# Patient Record
Sex: MALE | Race: WHITE | HISPANIC OR LATINO | Employment: OTHER | URBAN - METROPOLITAN AREA
[De-identification: names, ages, dates, MRNs, and addresses within clinical notes are randomized per-mention and may not be internally consistent; named-entity substitution may affect disease eponyms.]

---

## 2018-07-31 ENCOUNTER — HOSPITAL ENCOUNTER (EMERGENCY)
Facility: HOSPITAL | Age: 43
Discharge: HOME | End: 2018-07-31
Attending: EMERGENCY MEDICINE

## 2018-07-31 VITALS
DIASTOLIC BLOOD PRESSURE: 68 MMHG | TEMPERATURE: 98 F | RESPIRATION RATE: 20 BRPM | HEIGHT: 68 IN | WEIGHT: 196 LBS | BODY MASS INDEX: 29.7 KG/M2 | SYSTOLIC BLOOD PRESSURE: 108 MMHG | OXYGEN SATURATION: 99 % | HEART RATE: 81 BPM

## 2018-07-31 DIAGNOSIS — J18.9 PNEUMONIA OF RIGHT MIDDLE LOBE DUE TO INFECTIOUS ORGANISM: Primary | ICD-10-CM

## 2018-07-31 DIAGNOSIS — R07.9 CHEST PAIN: ICD-10-CM

## 2018-07-31 LAB
ALBUMIN SERPL BCP-MCNC: 4 G/DL
ALP SERPL-CCNC: 105 U/L
ALT SERPL W/O P-5'-P-CCNC: 38 U/L
ANION GAP SERPL CALC-SCNC: 12 MMOL/L
AST SERPL-CCNC: 21 U/L
BASOPHILS # BLD AUTO: 0.03 K/UL
BASOPHILS NFR BLD: 0.2 %
BILIRUB SERPL-MCNC: 0.8 MG/DL
BUN SERPL-MCNC: 9 MG/DL
CALCIUM SERPL-MCNC: 10.3 MG/DL
CHLORIDE SERPL-SCNC: 97 MMOL/L
CK SERPL-CCNC: 104 U/L
CO2 SERPL-SCNC: 25 MMOL/L
CREAT SERPL-MCNC: 0.8 MG/DL
D DIMER PPP IA.FEU-MCNC: 0.49 MG/L FEU
DIFFERENTIAL METHOD: ABNORMAL
EOSINOPHIL # BLD AUTO: 0.2 K/UL
EOSINOPHIL NFR BLD: 1.6 %
ERYTHROCYTE [DISTWIDTH] IN BLOOD BY AUTOMATED COUNT: 11.6 %
EST. GFR  (AFRICAN AMERICAN): >60 ML/MIN/1.73 M^2
EST. GFR  (NON AFRICAN AMERICAN): >60 ML/MIN/1.73 M^2
GLUCOSE SERPL-MCNC: 334 MG/DL
HCT VFR BLD AUTO: 50.6 %
HGB BLD-MCNC: 17.9 G/DL
LYMPHOCYTES # BLD AUTO: 3.2 K/UL
LYMPHOCYTES NFR BLD: 26.7 %
MCH RBC QN AUTO: 30.8 PG
MCHC RBC AUTO-ENTMCNC: 35.4 G/DL
MCV RBC AUTO: 87 FL
MONOCYTES # BLD AUTO: 0.9 K/UL
MONOCYTES NFR BLD: 7.1 %
NEUTROPHILS # BLD AUTO: 7.7 K/UL
NEUTROPHILS NFR BLD: 64.1 %
PLATELET # BLD AUTO: 352 K/UL
PMV BLD AUTO: 9.2 FL
POTASSIUM SERPL-SCNC: 4.2 MMOL/L
PROT SERPL-MCNC: 8.6 G/DL
RBC # BLD AUTO: 5.82 M/UL
SODIUM SERPL-SCNC: 134 MMOL/L
TROPONIN I SERPL DL<=0.01 NG/ML-MCNC: <0.006 NG/ML
WBC # BLD AUTO: 12.06 K/UL

## 2018-07-31 PROCEDURE — 85025 COMPLETE CBC W/AUTO DIFF WBC: CPT

## 2018-07-31 PROCEDURE — 87040 BLOOD CULTURE FOR BACTERIA: CPT | Mod: 59

## 2018-07-31 PROCEDURE — 99284 EMERGENCY DEPT VISIT MOD MDM: CPT | Mod: 25

## 2018-07-31 PROCEDURE — 80053 COMPREHEN METABOLIC PANEL: CPT

## 2018-07-31 PROCEDURE — 63600175 PHARM REV CODE 636 W HCPCS: Performed by: EMERGENCY MEDICINE

## 2018-07-31 PROCEDURE — 85379 FIBRIN DEGRADATION QUANT: CPT

## 2018-07-31 PROCEDURE — 84484 ASSAY OF TROPONIN QUANT: CPT

## 2018-07-31 PROCEDURE — 96365 THER/PROPH/DIAG IV INF INIT: CPT

## 2018-07-31 PROCEDURE — 82550 ASSAY OF CK (CPK): CPT

## 2018-07-31 PROCEDURE — 96375 TX/PRO/DX INJ NEW DRUG ADDON: CPT

## 2018-07-31 PROCEDURE — 25000003 PHARM REV CODE 250: Performed by: EMERGENCY MEDICINE

## 2018-07-31 RX ORDER — ONDANSETRON 2 MG/ML
4 INJECTION INTRAMUSCULAR; INTRAVENOUS
Status: COMPLETED | OUTPATIENT
Start: 2018-07-31 | End: 2018-07-31

## 2018-07-31 RX ORDER — IBUPROFEN 600 MG/1
600 TABLET ORAL EVERY 6 HOURS PRN
Qty: 20 TABLET | Refills: 0 | Status: SHIPPED | OUTPATIENT
Start: 2018-07-31 | End: 2020-01-10 | Stop reason: CLARIF

## 2018-07-31 RX ORDER — AZITHROMYCIN 250 MG/1
500 TABLET, FILM COATED ORAL
Status: COMPLETED | OUTPATIENT
Start: 2018-07-31 | End: 2018-07-31

## 2018-07-31 RX ORDER — MORPHINE SULFATE 4 MG/ML
4 INJECTION, SOLUTION INTRAMUSCULAR; INTRAVENOUS
Status: COMPLETED | OUTPATIENT
Start: 2018-07-31 | End: 2018-07-31

## 2018-07-31 RX ORDER — PROMETHAZINE HYDROCHLORIDE AND CODEINE PHOSPHATE 6.25; 1 MG/5ML; MG/5ML
5 SOLUTION ORAL EVERY 4 HOURS PRN
Qty: 120 ML | Refills: 0 | Status: SHIPPED | OUTPATIENT
Start: 2018-07-31 | End: 2018-08-10

## 2018-07-31 RX ORDER — AZITHROMYCIN 250 MG/1
TABLET, FILM COATED ORAL
Qty: 6 TABLET | Refills: 0 | Status: SHIPPED | OUTPATIENT
Start: 2018-07-31 | End: 2018-08-05

## 2018-07-31 RX ORDER — KETOROLAC TROMETHAMINE 30 MG/ML
30 INJECTION, SOLUTION INTRAMUSCULAR; INTRAVENOUS
Status: COMPLETED | OUTPATIENT
Start: 2018-07-31 | End: 2018-07-31

## 2018-07-31 RX ADMIN — AZITHROMYCIN MONOHYDRATE 500 MG: 250 TABLET ORAL at 07:07

## 2018-07-31 RX ADMIN — MORPHINE SULFATE 4 MG: 4 INJECTION INTRAVENOUS at 06:07

## 2018-07-31 RX ADMIN — ONDANSETRON 4 MG: 2 INJECTION, SOLUTION INTRAMUSCULAR; INTRAVENOUS at 06:07

## 2018-07-31 RX ADMIN — KETOROLAC TROMETHAMINE 30 MG: 30 INJECTION, SOLUTION INTRAMUSCULAR at 06:07

## 2018-07-31 RX ADMIN — CEFTRIAXONE 1 G: 1 INJECTION, SOLUTION INTRAVENOUS at 06:07

## 2018-07-31 RX ADMIN — SODIUM CHLORIDE 1000 ML: 0.9 INJECTION, SOLUTION INTRAVENOUS at 06:07
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
## 2018-07-31 NOTE — ED NOTES
Pt report received from OLIVA Campbell. Pt connected to cardiac monitor, BP cuff, and pulse ox. Will continue to monitor.

## 2018-08-05 LAB
BACTERIA BLD CULT: NORMAL
BACTERIA BLD CULT: NORMAL

## 2018-09-07 ENCOUNTER — HOSPITAL ENCOUNTER (INPATIENT)
Facility: HOSPITAL | Age: 43
LOS: 2 days | Discharge: HOME | DRG: 178 | End: 2018-09-11
Attending: EMERGENCY MEDICINE | Admitting: FAMILY MEDICINE

## 2018-09-07 DIAGNOSIS — R05.9 COUGH: ICD-10-CM

## 2018-09-07 DIAGNOSIS — E11.9 CONTROLLED TYPE 2 DIABETES MELLITUS WITHOUT COMPLICATION, WITHOUT LONG-TERM CURRENT USE OF INSULIN: ICD-10-CM

## 2018-09-07 DIAGNOSIS — R06.02 SOB (SHORTNESS OF BREATH): ICD-10-CM

## 2018-09-07 DIAGNOSIS — R07.89 ATYPICAL CHEST PAIN: ICD-10-CM

## 2018-09-07 DIAGNOSIS — J98.4 CAVITATING MASS IN RIGHT UPPER LUNG LOBE: Primary | ICD-10-CM

## 2018-09-07 DIAGNOSIS — R91.8 LUNG MASS: ICD-10-CM

## 2018-09-07 LAB
ALBUMIN SERPL BCP-MCNC: 3.6 G/DL
ALP SERPL-CCNC: 84 U/L
ALT SERPL W/O P-5'-P-CCNC: 31 U/L
ANION GAP SERPL CALC-SCNC: 12 MMOL/L
AST SERPL-CCNC: 23 U/L
BASOPHILS # BLD AUTO: 0.04 K/UL
BASOPHILS NFR BLD: 0.4 %
BILIRUB SERPL-MCNC: 0.4 MG/DL
BUN SERPL-MCNC: 15 MG/DL
CALCIUM SERPL-MCNC: 9.3 MG/DL
CHLORIDE SERPL-SCNC: 100 MMOL/L
CO2 SERPL-SCNC: 21 MMOL/L
CREAT SERPL-MCNC: 1 MG/DL
DIFFERENTIAL METHOD: ABNORMAL
EOSINOPHIL # BLD AUTO: 0.2 K/UL
EOSINOPHIL NFR BLD: 1.9 %
ERYTHROCYTE [DISTWIDTH] IN BLOOD BY AUTOMATED COUNT: 11.5 %
EST. GFR  (AFRICAN AMERICAN): >60 ML/MIN/1.73 M^2
EST. GFR  (NON AFRICAN AMERICAN): >60 ML/MIN/1.73 M^2
GLUCOSE SERPL-MCNC: 429 MG/DL
HCT VFR BLD AUTO: 40.8 %
HGB BLD-MCNC: 14.8 G/DL
LYMPHOCYTES # BLD AUTO: 2.9 K/UL
LYMPHOCYTES NFR BLD: 30.8 %
MCH RBC QN AUTO: 30.5 PG
MCHC RBC AUTO-ENTMCNC: 36.3 G/DL
MCV RBC AUTO: 84 FL
MONOCYTES # BLD AUTO: 0.8 K/UL
MONOCYTES NFR BLD: 8.4 %
NEUTROPHILS # BLD AUTO: 5.4 K/UL
NEUTROPHILS NFR BLD: 58.3 %
PLATELET # BLD AUTO: 309 K/UL
PMV BLD AUTO: 8.9 FL
POTASSIUM SERPL-SCNC: 4 MMOL/L
PROT SERPL-MCNC: 7.3 G/DL
RBC # BLD AUTO: 4.86 M/UL
SODIUM SERPL-SCNC: 133 MMOL/L
WBC # BLD AUTO: 9.34 K/UL

## 2018-09-07 PROCEDURE — 63600175 PHARM REV CODE 636 W HCPCS: Performed by: EMERGENCY MEDICINE

## 2018-09-07 PROCEDURE — 96372 THER/PROPH/DIAG INJ SC/IM: CPT | Mod: 59

## 2018-09-07 PROCEDURE — 96374 THER/PROPH/DIAG INJ IV PUSH: CPT

## 2018-09-07 PROCEDURE — 93005 ELECTROCARDIOGRAM TRACING: CPT

## 2018-09-07 PROCEDURE — 85025 COMPLETE CBC W/AUTO DIFF WBC: CPT

## 2018-09-07 PROCEDURE — 99285 EMERGENCY DEPT VISIT HI MDM: CPT | Mod: 25

## 2018-09-07 PROCEDURE — 93010 ELECTROCARDIOGRAM REPORT: CPT | Mod: ,,, | Performed by: INTERNAL MEDICINE

## 2018-09-07 PROCEDURE — 80053 COMPREHEN METABOLIC PANEL: CPT

## 2018-09-07 PROCEDURE — 96375 TX/PRO/DX INJ NEW DRUG ADDON: CPT

## 2018-09-07 PROCEDURE — 82962 GLUCOSE BLOOD TEST: CPT

## 2018-09-07 RX ORDER — KETOROLAC TROMETHAMINE 30 MG/ML
15 INJECTION, SOLUTION INTRAMUSCULAR; INTRAVENOUS
Status: COMPLETED | OUTPATIENT
Start: 2018-09-07 | End: 2018-09-07

## 2018-09-07 RX ORDER — MORPHINE SULFATE 4 MG/ML
4 INJECTION, SOLUTION INTRAMUSCULAR; INTRAVENOUS
Status: COMPLETED | OUTPATIENT
Start: 2018-09-07 | End: 2018-09-07

## 2018-09-07 RX ADMIN — KETOROLAC TROMETHAMINE 15 MG: 30 INJECTION, SOLUTION INTRAMUSCULAR at 11:09

## 2018-09-07 RX ADMIN — IOHEXOL 75 ML: 350 INJECTION, SOLUTION INTRAVENOUS at 11:09

## 2018-09-07 RX ADMIN — MORPHINE SULFATE 4 MG: 4 INJECTION, SOLUTION INTRAMUSCULAR; INTRAVENOUS at 11:09

## 2018-09-08 PROBLEM — R91.8 LUNG MASS: Status: ACTIVE | Noted: 2018-09-08

## 2018-09-08 PROBLEM — J98.4 CAVITATING MASS IN RIGHT UPPER LUNG LOBE: Status: ACTIVE | Noted: 2018-09-08

## 2018-09-08 LAB
AMPHET+METHAMPHET UR QL: NEGATIVE
BARBITURATES UR QL SCN>200 NG/ML: NEGATIVE
BENZODIAZ UR QL SCN>200 NG/ML: NEGATIVE
BILIRUB UR QL STRIP: NEGATIVE
BZE UR QL SCN: NEGATIVE
CANNABINOIDS UR QL SCN: NORMAL
CLARITY UR: CLEAR
COLOR UR: YELLOW
CREAT UR-MCNC: 132.5 MG/DL
CRP SERPL-MCNC: 21.28 MG/L
ERYTHROCYTE [SEDIMENTATION RATE] IN BLOOD BY WESTERGREN METHOD: 37 MM/HR
ESTIMATED AVG GLUCOSE: 280 MG/DL
ESTIMATED AVG GLUCOSE: 280 MG/DL
GLUCOSE UR QL STRIP: ABNORMAL
HBA1C MFR BLD HPLC: 11.4 %
HBA1C MFR BLD HPLC: 11.4 %
HGB UR QL STRIP: NEGATIVE
KETONES UR QL STRIP: NEGATIVE
LACTATE SERPL-SCNC: 1.3 MMOL/L
LEUKOCYTE ESTERASE UR QL STRIP: NEGATIVE
MAGNESIUM SERPL-MCNC: 2 MG/DL
METHADONE UR QL SCN>300 NG/ML: NEGATIVE
NITRITE UR QL STRIP: NEGATIVE
OPIATES UR QL SCN: NORMAL
PCP UR QL SCN>25 NG/ML: NEGATIVE
PH UR STRIP: 6 [PH] (ref 5–8)
PHOSPHATE SERPL-MCNC: 3.5 MG/DL
POCT GLUCOSE: 235 MG/DL (ref 70–110)
POCT GLUCOSE: 276 MG/DL (ref 70–110)
POCT GLUCOSE: 289 MG/DL (ref 70–110)
POCT GLUCOSE: 298 MG/DL (ref 70–110)
POCT GLUCOSE: 308 MG/DL (ref 70–110)
PROCALCITONIN SERPL IA-MCNC: 0.04 NG/ML
PROT UR QL STRIP: NEGATIVE
RPR SER QL: NORMAL
SP GR UR STRIP: 1.02 (ref 1–1.03)
T4 FREE SERPL-MCNC: 1.25 NG/DL
TOXICOLOGY INFORMATION: NORMAL
TSH SERPL DL<=0.005 MIU/L-ACNC: 0.04 UIU/ML
URN SPEC COLLECT METH UR: ABNORMAL
UROBILINOGEN UR STRIP-ACNC: NEGATIVE EU/DL

## 2018-09-08 PROCEDURE — 83605 ASSAY OF LACTIC ACID: CPT

## 2018-09-08 PROCEDURE — 87106 FUNGI IDENTIFICATION YEAST: CPT

## 2018-09-08 PROCEDURE — 87206 SMEAR FLUORESCENT/ACID STAI: CPT

## 2018-09-08 PROCEDURE — 80074 ACUTE HEPATITIS PANEL: CPT

## 2018-09-08 PROCEDURE — 85652 RBC SED RATE AUTOMATED: CPT

## 2018-09-08 PROCEDURE — 84145 PROCALCITONIN (PCT): CPT

## 2018-09-08 PROCEDURE — 97802 MEDICAL NUTRITION INDIV IN: CPT

## 2018-09-08 PROCEDURE — 94640 AIRWAY INHALATION TREATMENT: CPT

## 2018-09-08 PROCEDURE — 87385 HISTOPLASMA CAPSUL AG IA: CPT | Mod: 91

## 2018-09-08 PROCEDURE — 25000003 PHARM REV CODE 250: Performed by: FAMILY MEDICINE

## 2018-09-08 PROCEDURE — 84439 ASSAY OF FREE THYROXINE: CPT

## 2018-09-08 PROCEDURE — 86141 C-REACTIVE PROTEIN HS: CPT

## 2018-09-08 PROCEDURE — 87015 SPECIMEN INFECT AGNT CONCNTJ: CPT

## 2018-09-08 PROCEDURE — 25000242 PHARM REV CODE 250 ALT 637 W/ HCPCS: Performed by: STUDENT IN AN ORGANIZED HEALTH CARE EDUCATION/TRAINING PROGRAM

## 2018-09-08 PROCEDURE — 87210 SMEAR WET MOUNT SALINE/INK: CPT

## 2018-09-08 PROCEDURE — 87102 FUNGUS ISOLATION CULTURE: CPT

## 2018-09-08 PROCEDURE — 25500020 PHARM REV CODE 255: Performed by: EMERGENCY MEDICINE

## 2018-09-08 PROCEDURE — 86580 TB INTRADERMAL TEST: CPT | Performed by: STUDENT IN AN ORGANIZED HEALTH CARE EDUCATION/TRAINING PROGRAM

## 2018-09-08 PROCEDURE — 99900038 HC OT GENERIC THERAPY SCREENING (STAT)

## 2018-09-08 PROCEDURE — 87086 URINE CULTURE/COLONY COUNT: CPT

## 2018-09-08 PROCEDURE — 86003 ALLG SPEC IGE CRUDE XTRC EA: CPT

## 2018-09-08 PROCEDURE — G0378 HOSPITAL OBSERVATION PER HR: HCPCS

## 2018-09-08 PROCEDURE — 81003 URINALYSIS AUTO W/O SCOPE: CPT | Mod: 59

## 2018-09-08 PROCEDURE — 87070 CULTURE OTHR SPECIMN AEROBIC: CPT

## 2018-09-08 PROCEDURE — 63600175 PHARM REV CODE 636 W HCPCS: Performed by: STUDENT IN AN ORGANIZED HEALTH CARE EDUCATION/TRAINING PROGRAM

## 2018-09-08 PROCEDURE — 87116 MYCOBACTERIA CULTURE: CPT

## 2018-09-08 PROCEDURE — 86592 SYPHILIS TEST NON-TREP QUAL: CPT

## 2018-09-08 PROCEDURE — 83036 HEMOGLOBIN GLYCOSYLATED A1C: CPT

## 2018-09-08 PROCEDURE — 99900037 HC PT THERAPY SCREENING (STAT)

## 2018-09-08 PROCEDURE — 80307 DRUG TEST PRSMV CHEM ANLYZR: CPT

## 2018-09-08 PROCEDURE — 83735 ASSAY OF MAGNESIUM: CPT

## 2018-09-08 PROCEDURE — 86698 HISTOPLASMA ANTIBODY: CPT | Mod: 91

## 2018-09-08 PROCEDURE — 84100 ASSAY OF PHOSPHORUS: CPT

## 2018-09-08 PROCEDURE — 63600175 PHARM REV CODE 636 W HCPCS: Performed by: FAMILY MEDICINE

## 2018-09-08 PROCEDURE — 84443 ASSAY THYROID STIM HORMONE: CPT

## 2018-09-08 PROCEDURE — 87449 NOS EACH ORGANISM AG IA: CPT

## 2018-09-08 PROCEDURE — 86703 HIV-1/HIV-2 1 RESULT ANTBDY: CPT

## 2018-09-08 PROCEDURE — 82785 ASSAY OF IGE: CPT

## 2018-09-08 PROCEDURE — 87205 SMEAR GRAM STAIN: CPT

## 2018-09-08 PROCEDURE — 87040 BLOOD CULTURE FOR BACTERIA: CPT | Mod: 59

## 2018-09-08 PROCEDURE — 25000003 PHARM REV CODE 250: Performed by: STUDENT IN AN ORGANIZED HEALTH CARE EDUCATION/TRAINING PROGRAM

## 2018-09-08 PROCEDURE — 94761 N-INVAS EAR/PLS OXIMETRY MLT: CPT

## 2018-09-08 PROCEDURE — 87385 HISTOPLASMA CAPSUL AG IA: CPT

## 2018-09-08 RX ORDER — MORPHINE SULFATE 10 MG/ML
4 INJECTION, SOLUTION INTRAMUSCULAR; INTRAVENOUS EVERY 4 HOURS PRN
Status: DISCONTINUED | OUTPATIENT
Start: 2018-09-08 | End: 2018-09-08

## 2018-09-08 RX ORDER — ACETAMINOPHEN 325 MG/1
650 TABLET ORAL EVERY 8 HOURS PRN
Status: DISCONTINUED | OUTPATIENT
Start: 2018-09-08 | End: 2018-09-10

## 2018-09-08 RX ORDER — GLUCAGON 1 MG
1 KIT INJECTION
Status: DISCONTINUED | OUTPATIENT
Start: 2018-09-08 | End: 2018-09-08

## 2018-09-08 RX ORDER — IBUPROFEN 200 MG
24 TABLET ORAL
Status: DISCONTINUED | OUTPATIENT
Start: 2018-09-08 | End: 2018-09-11 | Stop reason: HOSPADM

## 2018-09-08 RX ORDER — KETOROLAC TROMETHAMINE 30 MG/ML
15 INJECTION, SOLUTION INTRAMUSCULAR; INTRAVENOUS EVERY 6 HOURS PRN
Status: DISCONTINUED | OUTPATIENT
Start: 2018-09-08 | End: 2018-09-08

## 2018-09-08 RX ORDER — IBUPROFEN 200 MG
16 TABLET ORAL
Status: DISCONTINUED | OUTPATIENT
Start: 2018-09-08 | End: 2018-09-11 | Stop reason: HOSPADM

## 2018-09-08 RX ORDER — RAMELTEON 8 MG/1
8 TABLET ORAL NIGHTLY PRN
Status: DISCONTINUED | OUTPATIENT
Start: 2018-09-08 | End: 2018-09-11 | Stop reason: HOSPADM

## 2018-09-08 RX ORDER — AMOXICILLIN 250 MG
1 CAPSULE ORAL 2 TIMES DAILY
Status: DISCONTINUED | OUTPATIENT
Start: 2018-09-08 | End: 2018-09-11 | Stop reason: HOSPADM

## 2018-09-08 RX ORDER — IPRATROPIUM BROMIDE AND ALBUTEROL SULFATE 2.5; .5 MG/3ML; MG/3ML
3 SOLUTION RESPIRATORY (INHALATION) EVERY 6 HOURS PRN
Status: DISCONTINUED | OUTPATIENT
Start: 2018-09-08 | End: 2018-09-11 | Stop reason: HOSPADM

## 2018-09-08 RX ORDER — KETOROLAC TROMETHAMINE 30 MG/ML
15 INJECTION, SOLUTION INTRAMUSCULAR; INTRAVENOUS EVERY 6 HOURS PRN
Status: COMPLETED | OUTPATIENT
Start: 2018-09-09 | End: 2018-09-09

## 2018-09-08 RX ORDER — SODIUM CHLORIDE FOR INHALATION 3 %
4 VIAL, NEBULIZER (ML) INHALATION ONCE
Status: DISCONTINUED | OUTPATIENT
Start: 2018-09-09 | End: 2018-09-08

## 2018-09-08 RX ORDER — SODIUM CHLORIDE FOR INHALATION 3 %
4 VIAL, NEBULIZER (ML) INHALATION EVERY 8 HOURS
Status: DISCONTINUED | OUTPATIENT
Start: 2018-09-09 | End: 2018-09-11 | Stop reason: HOSPADM

## 2018-09-08 RX ORDER — MORPHINE SULFATE 10 MG/ML
2 INJECTION, SOLUTION INTRAMUSCULAR; INTRAVENOUS ONCE
Status: DISCONTINUED | OUTPATIENT
Start: 2018-09-08 | End: 2018-09-09

## 2018-09-08 RX ORDER — ACETAMINOPHEN 325 MG/1
650 TABLET ORAL EVERY 4 HOURS PRN
Status: DISCONTINUED | OUTPATIENT
Start: 2018-09-08 | End: 2018-09-08

## 2018-09-08 RX ORDER — INSULIN ASPART 100 [IU]/ML
1-10 INJECTION, SOLUTION INTRAVENOUS; SUBCUTANEOUS
Status: DISCONTINUED | OUTPATIENT
Start: 2018-09-08 | End: 2018-09-11 | Stop reason: HOSPADM

## 2018-09-08 RX ORDER — SODIUM CHLORIDE FOR INHALATION 3 %
4 VIAL, NEBULIZER (ML) INHALATION ONCE
Status: COMPLETED | OUTPATIENT
Start: 2018-09-08 | End: 2018-09-08

## 2018-09-08 RX ORDER — INSULIN ASPART 100 [IU]/ML
1-10 INJECTION, SOLUTION INTRAVENOUS; SUBCUTANEOUS EVERY 6 HOURS PRN
Status: DISCONTINUED | OUTPATIENT
Start: 2018-09-08 | End: 2018-09-08

## 2018-09-08 RX ORDER — MORPHINE SULFATE 4 MG/ML
4 INJECTION, SOLUTION INTRAMUSCULAR; INTRAVENOUS EVERY 4 HOURS PRN
Status: DISCONTINUED | OUTPATIENT
Start: 2018-09-08 | End: 2018-09-08

## 2018-09-08 RX ORDER — SODIUM CHLORIDE, SODIUM LACTATE, POTASSIUM CHLORIDE, CALCIUM CHLORIDE 600; 310; 30; 20 MG/100ML; MG/100ML; MG/100ML; MG/100ML
INJECTION, SOLUTION INTRAVENOUS CONTINUOUS
Status: ACTIVE | OUTPATIENT
Start: 2018-09-08 | End: 2018-09-08

## 2018-09-08 RX ORDER — ONDANSETRON 8 MG/1
8 TABLET, ORALLY DISINTEGRATING ORAL EVERY 6 HOURS PRN
Status: DISCONTINUED | OUTPATIENT
Start: 2018-09-08 | End: 2018-09-11 | Stop reason: HOSPADM

## 2018-09-08 RX ORDER — SODIUM CHLORIDE FOR INHALATION 3 %
4 VIAL, NEBULIZER (ML) INHALATION ONCE
Status: DISCONTINUED | OUTPATIENT
Start: 2018-09-08 | End: 2018-09-11 | Stop reason: HOSPADM

## 2018-09-08 RX ORDER — VANCOMYCIN HCL IN 5 % DEXTROSE 1G/250ML
1000 PLASTIC BAG, INJECTION (ML) INTRAVENOUS
Status: DISCONTINUED | OUTPATIENT
Start: 2018-09-08 | End: 2018-09-08

## 2018-09-08 RX ORDER — GLUCAGON 1 MG
1 KIT INJECTION
Status: DISCONTINUED | OUTPATIENT
Start: 2018-09-08 | End: 2018-09-11 | Stop reason: HOSPADM

## 2018-09-08 RX ORDER — SODIUM CHLORIDE 0.9 % (FLUSH) 0.9 %
5 SYRINGE (ML) INJECTION
Status: DISCONTINUED | OUTPATIENT
Start: 2018-09-08 | End: 2018-09-11 | Stop reason: HOSPADM

## 2018-09-08 RX ADMIN — SENNOSIDES AND DOCUSATE SODIUM 1 TABLET: 8.6; 5 TABLET ORAL at 09:09

## 2018-09-08 RX ADMIN — ACETAMINOPHEN 650 MG: 325 TABLET ORAL at 10:09

## 2018-09-08 RX ADMIN — INSULIN DETEMIR 10 UNITS: 100 INJECTION, SOLUTION SUBCUTANEOUS at 09:09

## 2018-09-08 RX ADMIN — PIPERACILLIN AND TAZOBACTAM 4.5 G: 4; .5 INJECTION, POWDER, LYOPHILIZED, FOR SOLUTION INTRAVENOUS; PARENTERAL at 12:09

## 2018-09-08 RX ADMIN — INSULIN ASPART 3 UNITS: 100 INJECTION, SOLUTION INTRAVENOUS; SUBCUTANEOUS at 09:09

## 2018-09-08 RX ADMIN — VANCOMYCIN HYDROCHLORIDE 1500 MG: 10 INJECTION, POWDER, LYOPHILIZED, FOR SOLUTION INTRAVENOUS at 04:09

## 2018-09-08 RX ADMIN — ACETAMINOPHEN 650 MG: 325 TABLET ORAL at 09:09

## 2018-09-08 RX ADMIN — SODIUM CHLORIDE 30 MG/ML INHALATION SOLUTION 4 ML: 30 SOLUTION INHALANT at 09:09

## 2018-09-08 RX ADMIN — VANCOMYCIN HYDROCHLORIDE 1000 MG: 1 INJECTION, POWDER, LYOPHILIZED, FOR SOLUTION INTRAVENOUS at 03:09

## 2018-09-08 RX ADMIN — SODIUM CHLORIDE, SODIUM LACTATE, POTASSIUM CHLORIDE, AND CALCIUM CHLORIDE: .6; .31; .03; .02 INJECTION, SOLUTION INTRAVENOUS at 12:09

## 2018-09-08 RX ADMIN — AMPICILLIN SODIUM AND SULBACTAM SODIUM 1.5 G: 2; 1 INJECTION, POWDER, FOR SOLUTION INTRAMUSCULAR; INTRAVENOUS at 09:09

## 2018-09-08 RX ADMIN — MORPHINE SULFATE 4 MG: 4 INJECTION, SOLUTION INTRAMUSCULAR; INTRAVENOUS at 05:09

## 2018-09-08 RX ADMIN — INSULIN ASPART 4 UNITS: 100 INJECTION, SOLUTION INTRAVENOUS; SUBCUTANEOUS at 12:09

## 2018-09-08 RX ADMIN — PIPERACILLIN AND TAZOBACTAM 4.5 G: 4; .5 INJECTION, POWDER, LYOPHILIZED, FOR SOLUTION INTRAVENOUS; PARENTERAL at 05:09

## 2018-09-08 RX ADMIN — SODIUM CHLORIDE, SODIUM LACTATE, POTASSIUM CHLORIDE, AND CALCIUM CHLORIDE: .6; .31; .03; .02 INJECTION, SOLUTION INTRAVENOUS at 04:09

## 2018-09-08 RX ADMIN — INSULIN ASPART 6 UNITS: 100 INJECTION, SOLUTION INTRAVENOUS; SUBCUTANEOUS at 06:09

## 2018-09-08 RX ADMIN — TUBERCULIN PURIFIED PROTEIN DERIVATIVE 5 UNITS: 5 INJECTION INTRADERMAL at 04:09

## 2018-09-08 RX ADMIN — MORPHINE SULFATE 4 MG: 4 INJECTION, SOLUTION INTRAMUSCULAR; INTRAVENOUS at 03:09

## 2018-09-08 RX ADMIN — INSULIN ASPART 3 UNITS: 100 INJECTION, SOLUTION INTRAVENOUS; SUBCUTANEOUS at 02:09
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
## 2018-09-08 NOTE — ED NOTES
Pt presents to the ED with c/o sob. Pt reports he was seen here 3 weeks ago for pneumonia. Pt reports coughing since and coughing blood-tinged sputum. Pt denies cp, headache, n/v, abdominal pain, bladder or bowel issues at this time. Visitors at bedside.
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
## 2018-09-08 NOTE — PLAN OF CARE
Problem: Patient Care Overview  Goal: Plan of Care Review  Outcome: Ongoing (interventions implemented as appropriate)  Recommendations     Recommendation/Intervention: 1.When medically able, ADAT to Diabetic. 2. If PO intake < 50 %, add boost glucose control BID. 3.Written materials attached to EMR for print at discharge.  4. Onsite RD to complete Diabetic diet education by follow up.   Goals: Diet advancement within 72 hrs   Nutrition Goal Status: new  Communication of RD Recs: (POC, sticky note)

## 2018-09-08 NOTE — PLAN OF CARE
Problem: Patient Care Overview  Goal: Plan of Care Review  Outcome: Ongoing (interventions implemented as appropriate)  Plan of care reviewed with the patient. Verbalized clear understanding. Bed alarm set. Bed in lowest position. Pt remain afebrile and free of fall. Call light within reach. Instructed pt to call when getting out of bed. Complaints of R chest discomfort, given prn tylenol and morphine. Reported relief from medication given. IV antibiotics given. Accucheck achs and SSI given as needed. NSR HR between 60's-70's on telemetry monitor. Maintained airborne precautions at all times. No report of SOB or lightheadedness. Will continue to monitor.
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
## 2018-09-08 NOTE — PT/OT/SLP PROGRESS
Physical Therapy  Functional Screen and Discharge    Patient Name:  Andrea Fernández   MRN:  [id number]    PT screen completed this date.     Pt ambulated within the room independently and completed bed mobility at mod I level. Pt with no strength or functional mobility deficits at this time.    No IP PT needs, d/c PT.    Ceci Butler, PT   9/8/2018

## 2018-09-08 NOTE — PT/OT/SLP PROGRESS
Occupational Therapy  OT Screen and D/c    Patient Name:  Andrea Fernández   MRN:  [id number]    Patient participating in screen this date. Pt lives with wife and dghtr in 2 story condo, tub/shower combo. Pt reports (I) as PLOF     Pt independently ambulating and performing ADLs throughout room without assistance. Pt reports no concerns over self care skills and/or functional mobility.     No OT services required. D/c OT     Lin Romano OT  9/8/2018

## 2018-09-09 LAB
ALBUMIN SERPL BCP-MCNC: 3.3 G/DL
ALP SERPL-CCNC: 66 U/L
ALT SERPL W/O P-5'-P-CCNC: 33 U/L
ANION GAP SERPL CALC-SCNC: 8 MMOL/L
AST SERPL-CCNC: 18 U/L
BACTERIA UR CULT: NO GROWTH
BASOPHILS # BLD AUTO: 0.02 K/UL
BASOPHILS NFR BLD: 0.3 %
BILIRUB SERPL-MCNC: 0.7 MG/DL
BUN SERPL-MCNC: 10 MG/DL
CALCIUM SERPL-MCNC: 9.4 MG/DL
CHLORIDE SERPL-SCNC: 100 MMOL/L
CHOLEST SERPL-MCNC: 131 MG/DL
CHOLEST/HDLC SERPL: 5.7 {RATIO}
CO2 SERPL-SCNC: 25 MMOL/L
CREAT SERPL-MCNC: 0.7 MG/DL
DIFFERENTIAL METHOD: ABNORMAL
EOSINOPHIL # BLD AUTO: 0.2 K/UL
EOSINOPHIL NFR BLD: 2.7 %
ERYTHROCYTE [DISTWIDTH] IN BLOOD BY AUTOMATED COUNT: 11.6 %
EST. GFR  (AFRICAN AMERICAN): >60 ML/MIN/1.73 M^2
EST. GFR  (NON AFRICAN AMERICAN): >60 ML/MIN/1.73 M^2
GLUCOSE SERPL-MCNC: 242 MG/DL (ref 70–110)
GLUCOSE SERPL-MCNC: 285 MG/DL
HCT VFR BLD AUTO: 40.3 %
HDLC SERPL-MCNC: 23 MG/DL
HDLC SERPL: 17.6 %
HGB BLD-MCNC: 14.5 G/DL
KOH PREP SPEC: NORMAL
LDLC SERPL CALC-MCNC: 78.8 MG/DL
LYMPHOCYTES # BLD AUTO: 2.7 K/UL
LYMPHOCYTES NFR BLD: 37.9 %
MAGNESIUM SERPL-MCNC: 1.7 MG/DL
MCH RBC QN AUTO: 30.3 PG
MCHC RBC AUTO-ENTMCNC: 36 G/DL
MCV RBC AUTO: 84 FL
MONOCYTES # BLD AUTO: 0.4 K/UL
MONOCYTES NFR BLD: 5.6 %
NEUTROPHILS # BLD AUTO: 3.8 K/UL
NEUTROPHILS NFR BLD: 53.2 %
NONHDLC SERPL-MCNC: 108 MG/DL
PHOSPHATE SERPL-MCNC: 3.8 MG/DL
PLATELET # BLD AUTO: 273 K/UL
PMV BLD AUTO: 8.5 FL
POCT GLUCOSE: 242 MG/DL (ref 70–110)
POCT GLUCOSE: 253 MG/DL (ref 70–110)
POCT GLUCOSE: 254 MG/DL (ref 70–110)
POCT GLUCOSE: 301 MG/DL (ref 70–110)
POTASSIUM SERPL-SCNC: 4 MMOL/L
PROT SERPL-MCNC: 6.5 G/DL
RBC # BLD AUTO: 4.79 M/UL
SODIUM SERPL-SCNC: 133 MMOL/L
TRIGL SERPL-MCNC: 146 MG/DL
TROPONIN I SERPL DL<=0.01 NG/ML-MCNC: <0.006 NG/ML
WBC # BLD AUTO: 7.1 K/UL

## 2018-09-09 PROCEDURE — 25000003 PHARM REV CODE 250: Performed by: FAMILY MEDICINE

## 2018-09-09 PROCEDURE — 87206 SMEAR FLUORESCENT/ACID STAI: CPT

## 2018-09-09 PROCEDURE — 11000001 HC ACUTE MED/SURG PRIVATE ROOM

## 2018-09-09 PROCEDURE — 87305 ASPERGILLUS AG IA: CPT

## 2018-09-09 PROCEDURE — 94761 N-INVAS EAR/PLS OXIMETRY MLT: CPT

## 2018-09-09 PROCEDURE — 87449 NOS EACH ORGANISM AG IA: CPT

## 2018-09-09 PROCEDURE — 84100 ASSAY OF PHOSPHORUS: CPT

## 2018-09-09 PROCEDURE — 80053 COMPREHEN METABOLIC PANEL: CPT

## 2018-09-09 PROCEDURE — 63600175 PHARM REV CODE 636 W HCPCS: Performed by: STUDENT IN AN ORGANIZED HEALTH CARE EDUCATION/TRAINING PROGRAM

## 2018-09-09 PROCEDURE — 87449 NOS EACH ORGANISM AG IA: CPT | Mod: 91

## 2018-09-09 PROCEDURE — 83735 ASSAY OF MAGNESIUM: CPT

## 2018-09-09 PROCEDURE — 93005 ELECTROCARDIOGRAM TRACING: CPT

## 2018-09-09 PROCEDURE — 87015 SPECIMEN INFECT AGNT CONCNTJ: CPT

## 2018-09-09 PROCEDURE — 25000003 PHARM REV CODE 250: Performed by: STUDENT IN AN ORGANIZED HEALTH CARE EDUCATION/TRAINING PROGRAM

## 2018-09-09 PROCEDURE — 25000242 PHARM REV CODE 250 ALT 637 W/ HCPCS: Performed by: FAMILY MEDICINE

## 2018-09-09 PROCEDURE — 63600175 PHARM REV CODE 636 W HCPCS: Performed by: FAMILY MEDICINE

## 2018-09-09 PROCEDURE — 84484 ASSAY OF TROPONIN QUANT: CPT

## 2018-09-09 PROCEDURE — 94640 AIRWAY INHALATION TREATMENT: CPT

## 2018-09-09 PROCEDURE — 85025 COMPLETE CBC W/AUTO DIFF WBC: CPT

## 2018-09-09 PROCEDURE — 87116 MYCOBACTERIA CULTURE: CPT

## 2018-09-09 PROCEDURE — 80061 LIPID PANEL: CPT

## 2018-09-09 PROCEDURE — 93010 ELECTROCARDIOGRAM REPORT: CPT | Mod: ,,, | Performed by: INTERNAL MEDICINE

## 2018-09-09 PROCEDURE — 36415 COLL VENOUS BLD VENIPUNCTURE: CPT

## 2018-09-09 RX ORDER — CODEINE PHOSPHATE AND GUAIFENESIN 10; 100 MG/5ML; MG/5ML
5 SOLUTION ORAL EVERY 8 HOURS PRN
Status: DISCONTINUED | OUTPATIENT
Start: 2018-09-09 | End: 2018-09-11 | Stop reason: HOSPADM

## 2018-09-09 RX ORDER — IBUPROFEN 400 MG/1
800 TABLET ORAL EVERY 8 HOURS PRN
Status: DISCONTINUED | OUTPATIENT
Start: 2018-09-09 | End: 2018-09-10

## 2018-09-09 RX ORDER — MORPHINE SULFATE 4 MG/ML
2 INJECTION, SOLUTION INTRAMUSCULAR; INTRAVENOUS ONCE
Status: COMPLETED | OUTPATIENT
Start: 2018-09-09 | End: 2018-09-09

## 2018-09-09 RX ORDER — LIDOCAINE 50 MG/G
2 PATCH TOPICAL
Status: DISCONTINUED | OUTPATIENT
Start: 2018-09-09 | End: 2018-09-11 | Stop reason: HOSPADM

## 2018-09-09 RX ADMIN — SODIUM CHLORIDE 30 MG/ML INHALATION SOLUTION 4 ML: 30 SOLUTION INHALANT at 11:09

## 2018-09-09 RX ADMIN — KETOROLAC TROMETHAMINE 15 MG: 30 INJECTION, SOLUTION INTRAMUSCULAR at 09:09

## 2018-09-09 RX ADMIN — INSULIN ASPART 8 UNITS: 100 INJECTION, SOLUTION INTRAVENOUS; SUBCUTANEOUS at 12:09

## 2018-09-09 RX ADMIN — KETOROLAC TROMETHAMINE 15 MG: 30 INJECTION, SOLUTION INTRAMUSCULAR at 03:09

## 2018-09-09 RX ADMIN — SENNOSIDES AND DOCUSATE SODIUM 1 TABLET: 8.6; 5 TABLET ORAL at 09:09

## 2018-09-09 RX ADMIN — SODIUM CHLORIDE 30 MG/ML INHALATION SOLUTION 4 ML: 30 SOLUTION INHALANT at 03:09

## 2018-09-09 RX ADMIN — INSULIN DETEMIR 20 UNITS: 100 INJECTION, SOLUTION SUBCUTANEOUS at 09:09

## 2018-09-09 RX ADMIN — AMPICILLIN SODIUM AND SULBACTAM SODIUM 1.5 G: 2; 1 INJECTION, POWDER, FOR SOLUTION INTRAMUSCULAR; INTRAVENOUS at 01:09

## 2018-09-09 RX ADMIN — AMPICILLIN SODIUM AND SULBACTAM SODIUM 1.5 G: 2; 1 INJECTION, POWDER, FOR SOLUTION INTRAMUSCULAR; INTRAVENOUS at 08:09

## 2018-09-09 RX ADMIN — SODIUM CHLORIDE 30 MG/ML INHALATION SOLUTION 4 ML: 30 SOLUTION INHALANT at 04:09

## 2018-09-09 RX ADMIN — AMPICILLIN SODIUM AND SULBACTAM SODIUM 1.5 G: 2; 1 INJECTION, POWDER, FOR SOLUTION INTRAMUSCULAR; INTRAVENOUS at 07:09

## 2018-09-09 RX ADMIN — INSULIN ASPART 8 UNITS: 100 INJECTION, SOLUTION INTRAVENOUS; SUBCUTANEOUS at 06:09

## 2018-09-09 RX ADMIN — GUAIFENESIN AND CODEINE PHOSPHATE 5 ML: 100; 10 SOLUTION ORAL at 07:09

## 2018-09-09 RX ADMIN — LIDOCAINE 2 PATCH: 50 PATCH TOPICAL at 09:09

## 2018-09-09 RX ADMIN — INSULIN ASPART 6 UNITS: 100 INJECTION, SOLUTION INTRAVENOUS; SUBCUTANEOUS at 09:09

## 2018-09-09 RX ADMIN — MORPHINE SULFATE 2 MG: 4 INJECTION INTRAVENOUS at 12:09

## 2018-09-09 RX ADMIN — GUAIFENESIN AND CODEINE PHOSPHATE 5 ML: 100; 10 SOLUTION ORAL at 09:09

## 2018-09-09 RX ADMIN — AMPICILLIN SODIUM AND SULBACTAM SODIUM 1.5 G: 2; 1 INJECTION, POWDER, FOR SOLUTION INTRAMUSCULAR; INTRAVENOUS at 02:09

## 2018-09-09 RX ADMIN — INSULIN ASPART 3 UNITS: 100 INJECTION, SOLUTION INTRAVENOUS; SUBCUTANEOUS at 09:09

## 2018-09-09 NOTE — PLAN OF CARE
Problem: Patient Care Overview  Goal: Plan of Care Review  Outcome: Ongoing (interventions implemented as appropriate)  Reviewed plan of care with the patient and spouse, verbalized understanding.  AAOx4, VSS.  Ambulatory, independent.  Maintained airborne precautions.  Complaint of chest discomfort prn tylenol and one time dose morphine given, reported relief from medication.  IV antibiotics given as ordered.  Sputum collected at 2210.  Urine collected at 0600.  BS monitoring.  On continuous telemetry monitor on, SB to NSR with inverted T, HR in 50s-70s.  No true red alarm noted.  Fall precaution measures maintained.  Bed locked, bed alarm on, bed in the lowest position, side rails up x2, non-skid socks applied, and call light within reach.  Instructed to call for any assistance.  Will continue to monitor.

## 2018-09-09 NOTE — DISCHARGE INSTRUCTIONS
Diabetes, Diet (English) View Edit Remove  Diabetes and Heart Disease (English) View Edit Remove  Diabetes and Alcohol Consumption (English) View Edit Remove  Diabetes and Periodontal Disease: An Increased Risk (English) View Edit Remove  Diabetes, Carbohydrates, Fats, and Protein, Understanding (English) View Edit Remove  Diabetes, Eating Out When You Have (English) View Edit Remove  Diabetes, Fitness Program, Tracking Your (English) View Edit Remove  Diabetes, Foot Care Program, Your (English) View Edit Remove  Adult, Pneumonia (English) View Edit Remove  Tuberculosis, Medicine (English) View Edit Remove  Tuberculosis (TB) (English) View Edit Remove  Tuberculosis (TB), Discharge Instructions for (English) View Edit Remove  Tuberculosis, How to Prevent the Spread of (English) View Edit Remove  Amoxicillin; Clavulanic Acid tablets (English) View Edit Remove

## 2018-09-09 NOTE — PLAN OF CARE
Problem: Patient Care Overview  Goal: Plan of Care Review  Outcome: Ongoing (interventions implemented as appropriate)  Plan of care reviewed with patient. Patient verbalized complete understanding. IV patent and intact. Keep on Unasyn for now. Keep in respiratory isolation for now awaiting results of AFB stains.Patient c/o chest pain when coughing throughout the shift, PRN Toradol given per doctors order. Patient also likes to sit on edge of bed for relief when coughing. Patient visitors educated on wearing isolation mask upon entering patient room, visitors adhering to isolation precaution protol. Diabetic education given with written information, patient stated that he was not compliant with his diabetes at home. Fall precautions maintained this shift. Bed in lowest position, locked, call light within reach, and slip resistant socks on. Nurse instructed patient to notify staff for any assistance and patient verbalized complete understanding. Patient on telemetry monitor throughout the shift with no ectopy noted. Will cont to monitor.

## 2018-09-09 NOTE — PROGRESS NOTES
Progress Note   [facility]      Subjective:      Overnight: Patient complained of continued pleuritic chest pain rated 6/10. Received Morphine 2mg one time. EKG and Troponin ordered. Respiratory therapy to collect induced sputum. Next two samples to be collected q8h.     Patient was examined at bedside this AM. Patient continues to endorse productive cough with approx. 1/2 tsp yellow-brown sputum that is odorless. Patient continues to endorse pleuritic chest pain 4/10, right sided, chest tender to touch and pain worsened with movement and coughing. Pain improved with pain medication. Patient states that he otherwise feels well and has no other complaints at this time.       Review of Systems   Constitutional: Positive for weight loss. Negative for chills, diaphoresis, fever and malaise/fatigue.   HENT: Negative for congestion, nosebleeds and sore throat.    Eyes: Negative for blurred vision, double vision and photophobia.   Respiratory: Positive for cough, hemoptysis, sputum production and shortness of breath. Negative for wheezing.    Cardiovascular: Positive for chest pain. Negative for palpitations, orthopnea, leg swelling and PND.   Gastrointestinal: Negative for abdominal pain, blood in stool, constipation, diarrhea, melena, nausea and vomiting.   Genitourinary: Negative for dysuria, flank pain and frequency.   Musculoskeletal: Negative for back pain, joint pain, myalgias and neck pain.   Skin: Negative for rash.   Neurological: Negative for dizziness, sensory change, speech change, focal weakness, seizures, loss of consciousness, weakness and headaches.   Endo/Heme/Allergies: Negative for polydipsia.   Psychiatric/Behavioral: Negative.      Allergies: NKDA     Current Facility-Administered Medications   Medication Dose Route Frequency Provider Last Rate Last Dose    acetaminophen tablet 650 mg  650 mg Oral Q8H PRN Dylan Cedillo MD   650 mg at 09/08/18 6767    albuterol-ipratropium 2.5 mg-0.5  mg/3 mL nebulizer solution 3 mL  3 mL Nebulization Q6H PRN Dylan Cedillo MD        ampicillin-sulbactam (UNASYN) 1.5 g in sodium chloride 0.9% 100 mL IVPB  1.5 g Intravenous Q6H Jonathan Aguila  mL/hr at 09/08/18 2130 1.5 g at 09/08/18 2130    dextrose 50% injection 12.5 g  12.5 g Intravenous PRN Dylan Cedillo MD        dextrose 50% injection 25 g  25 g Intravenous PRN Dylan Cedillo MD        glucagon (human recombinant) injection 1 mg  1 mg Intramuscular PRN Dylan Cedillo MD        glucose chewable tablet 16 g  16 g Oral PRN Dylan Cedillo MD        glucose chewable tablet 24 g  24 g Oral PRN Dylan Cedillo MD        influenza (FLUZONE QUADRIVALENT) vaccine 0.5 mL  0.5 mL Intramuscular Prior to discharge Shayy Chin MD        insulin aspart U-100 pen 1-10 Units  1-10 Units Subcutaneous QID (AC + HS) PRN Dylan Cedillo MD   3 Units at 09/08/18 2147    insulin detemir U-100 pen 20 Units  20 Units Subcutaneous Daily Jonathan Aguila MD        ketorolac injection 15 mg  15 mg Intravenous Q6H PRN Beto Lin MD        ondansetron disintegrating tablet 8 mg  8 mg Oral Q6H PRN Dylan Cedillo MD        promethazine (PHENERGAN) 6.25 mg in dextrose 5 % 50 mL IVPB  6.25 mg Intravenous Q6H PRN Dylan Cedillo MD        ramelteon tablet 8 mg  8 mg Oral Nightly PRN Dylan Cedillo MD        senna-docusate 8.6-50 mg per tablet 1 tablet  1 tablet Oral BID Dylan Cedillo MD   1 tablet at 09/08/18 2146    sodium chloride 0.9% flush 5 mL  5 mL Intravenous PRN Dylan Cedillo MD        sodium chloride 3% nebulizer solution 4 mL  4 mL Nebulization Once Dlyan Cedillo MD   Stopped at 09/08/18 0330    sodium chloride 3% nebulizer solution 4 mL  4 mL Nebulization Q8H Shayy Chin MD           Objective:    Vital Signs (Most Recent):  Vitals:    09/08/18 2108   BP:    Pulse: 64   Resp: 16   Temp:        Physical  Exam:  General: NAD  HEENT: Conjunctivae and EOM are normal. PERRL. No scleral icterus.   Neck: supple. No LAD. No masses. No thyromegaly. No bruits.  Cardio: RRR. S1/S2, NO m/r/g. Chest tender to palpation.  Pulmonary: Clear to ausculatation b/l, no wheezes/rales/crackles.  Skin: No rashes or lesions. Multiple tattoos.   Abdomen: BS+ soft, non-tender, non-distended. No masses. No rebound/guarding. No hepatosplenomegaly.  Extremities: no cyanosis, clubbing, or edema. No rash or lesions. 2+ radial and dorsal;is pedal pulses. Capillary refills < 2 secs.   Neuro: A&Ox3. CN II-XII grossly intact. No decrease in strength. No decrease in sensation.    Laboratory:    Most Recent Data:  CBC:   Lab Results   Component Value Date    WBC 9.34 09/07/2018    HGB 14.8 09/07/2018    HCT 40.8 09/07/2018     09/07/2018    MCV 84 09/07/2018    RDW 11.5 09/07/2018     BMP:   Lab Results   Component Value Date     (L) 09/07/2018    K 4.0 09/07/2018     09/07/2018    CO2 21 (L) 09/07/2018    BUN 15 09/07/2018     (H) 09/07/2018    CALCIUM 9.3 09/07/2018    MG 2.0 09/08/2018    PHOS 3.5 09/08/2018     LFTs:   Lab Results   Component Value Date    PROT 7.3 09/07/2018    ALBUMIN 3.6 09/07/2018    BILITOT 0.4 09/07/2018    AST 23 09/07/2018    ALKPHOS 84 09/07/2018    ALT 31 09/07/2018     Coags: No results found for: INR, PROTIME, PTT  FLP: No results found for: CHOL, HDL, LDLCALC, TRIG, CHOLHDL  DM:   Lab Results   Component Value Date    HGBA1C 11.4 (H) 09/08/2018    HGBA1C 11.4 (H) 09/08/2018    CREATININE 1.0 09/07/2018     HgbA1c:   Lab Results   Component Value Date    HGBA1C 11.4 (H) 09/08/2018    HGBA1C 11.4 (H) 09/08/2018     Thyroid:   Lab Results   Component Value Date    TSH 0.036 (L) 09/08/2018     Anemia: No results found for: IRON, TIBC, FERRITIN, [id number], FOLATE  Cardiac:   Lab Results   Component Value Date    TROPONINI <0.006 07/31/2018     Trended Lab Data:  Recent Labs   Lab  09/07/18   4142    WBC  9.34   HGB  14.8   HCT  40.8   PLT  309   MCV  84   RDW  11.5   NA  133*   K  4.0   CL  100   CO2  21*   BUN  15   GLU  429*   PROT  7.3   ALBUMIN  3.6   BILITOT  0.4   AST  23   ALKPHOS  84   ALT  31       Microbiology Results (last 7 days)     Procedure Component Value Units Date/Time    Fungus culture [[id number]] Collected:  09/08/18 2210    Order Status:  Sent Specimen:  Body Fluid from Sputum Updated:  09/09/18 0021    KOH prep [[id number]] Collected:  09/08/18 2210    Order Status:  Sent Specimen:  Body Fluid from Sputum Updated:  09/09/18 0021    AFB Culture & Smear [[id number]]     Order Status:  No result Specimen:  Sputum, Induced     Blood culture [[id number]] Collected:  09/08/18 0346    Order Status:  Completed Specimen:  Blood Updated:  09/08/18 1515     Blood Culture, Routine No Growth to date    Blood culture [[id number]] Collected:  09/08/18 0346    Order Status:  Completed Specimen:  Blood Updated:  09/08/18 1515     Blood Culture, Routine No Growth to date    Culture, Respiratory with Gram Stain [[id number]] Collected:  09/08/18 1038    Order Status:  Completed Specimen:  Respiratory from Sputum Updated:  09/08/18 1509     Gram Stain (Respiratory) <10 epithelial cells per low power field.     Gram Stain (Respiratory) Rare WBC's     Gram Stain (Respiratory) Moderate Gram negative rods     Gram Stain (Respiratory) Moderate Gram positive cocci    Urine culture [[id number]] Collected:  09/08/18 0842    Order Status:  Sent Specimen:  Urine, Clean Catch Updated:  09/08/18 1220    AFB Culture & Smear [[id number]] Collected:  09/08/18 1016    Order Status:  Sent Specimen:  Body Fluid from Sputum, Induced Updated:  09/08/18 1220    AFB Culture & Smear [[id number]] Collected:  09/08/18 1016    Order Status:  Canceled Specimen:  Body Fluid from Sputum, Induced Updated:  09/08/18 1220    AFB Culture & Smear [[id number]] Collected:  09/08/18 1016    Order Status:  Canceled Specimen:  Body Fluid from Sputum,  Induced Updated:  09/08/18 1220          Urinalysis:   Lab Results   Component Value Date    COLORU Yellow 09/08/2018    SPECGRAV 1.025 09/08/2018    NITRITE Negative 09/08/2018    PROTEINUR trace 12/19/2016    KETONESU Negative 09/08/2018    UROBILINOGEN Negative 09/08/2018    BILIRUBINUR neg 12/19/2016     Radiology:   CT Chest With Contrast   Final Result   Abnormal      3.3 x 2.9 cavitary masslike opacity in the right upper lobe and ill-defined nodular density in the left upper lobe.  Differential considerations remain between neoplasm and infection.  Follow-up with pulmonary medicine and follow-up to resolution is recommended.      Trace right-sided pleural effusion.      Cholelithiasis.      Additional findings as above.      This report was flagged in Epic as abnormal.         Electronically signed by: Fabian Lorenzana MD   Date:    09/08/2018   Time:    00:31      X-Ray Chest PA And Lateral   Final Result   Abnormal      Persistent masslike opacity in the right upper lung field.  Given persistence of this finding over the last 5 weeks, malignancy is included in the differential in addition to infectious or inflammatory processes.  Recommend further evaluation with chest CT with IV contrast when clinically appropriate.      This report was flagged in Epic as abnormal.         Electronically signed by: Jakub Cabral MD   Date:    09/07/2018   Time:    21:28          Right upper lobe mass        Assessment/Plan    42 y/o M with DM2 presents with right cavitary lung mass measuring 3.3 x 2.9 in right upper lobe indentified by CT Chest. Patient returned to ED for continued symptomology including cough, night sweats and blood-tinged sputum following treatment of suspected pneumonia one month prior. Possible differential at this time includes Necrotizing Pneumonia vs.TB vs. Histoplasmosis vs. Blastomycosis vs Aspergillosis vs. Malignancy. Patient on Day 2 Unasyn 1.5g q6h per pulmonary recs. Patient is currently  afebrile and WBC within normal limits in stable condition. Patient now has Atypical chest pain, will rule out ACS.     Right upper lobe mass   Continue Unasyn 1.6g q6hr per pulm recs  Continue Isolation precautions   Remaining 2 induced sputum samples for AFB to be collected today  F/u Quantgold  F/u Blasto, Histo and Aspergillus labs  F/u HIV, RPR and Hepatitis panel   Infectious disease consult placed.     Type 2 Diabetes  Newly diagnosed on this admission   A1c 11.4 on 9/8/18  Continue diabetic diet   Detemir 20 units daily   Continue SSI    Atypical Chest Pain -  likely musculoskeletal   -Chest wall tedner to palpation  F/u EKG - no significant findings  F/u Troponin normal     PPx:SCD     Beto Lin MD   [facility], PGY-1     I have personally seen this patient, reviewed the chart, history, physical exam, evaluation, and treatment plan by Dr. Lin.  I have examined this patient at bedside, and I agree with the care provided.

## 2018-09-09 NOTE — NURSING
Blood sugar done this afternoon would not download to the computor.  It was 242.  The nurse has been advised.

## 2018-09-09 NOTE — PLAN OF CARE
Notified Dr. Lin that patient has chest pain and stated tylenol didn't help much.  Informed that patient has only tylenol order for pain and patient wants morphine.  MD stated he will order pain medication.
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
## 2018-09-09 NOTE — PLAN OF CARE
Chief Complaint   Patient presents with    Shortness of Breath       C/O SOB AND RIGHT SIDED CHEST PAIN. REPORTS DIAGNOSED WITH PNEUMONIA 3 WEEKS AGO.     Pt on Airborne precautions    Pt independent with ADLs, no HH/HME, lives with SO: Rosy Mercado and minor daughter. Pt drives self to appts. Rosy will provide transportation on discharge    [place] provided pt with Discharge Planning Brochure and Business Card and wrote name on whiteboard         09/09/18 1255   Discharge Assessment   Assessment Type Discharge Planning Assessment   Confirmed/corrected address and phone number on facesheet? Yes   Assessment information obtained from? Patient;Caregiver  (Pt and SO: Rosy Pari [phone])   Expected Length of Stay (days) 3   Communicated expected length of stay with patient/caregiver yes   Prior to hospitilization cognitive status: Alert/Oriented   Prior to hospitalization functional status: Independent   Current cognitive status: Alert/Oriented   Current Functional Status: Independent   Facility Arrived From: (home)   Lives With significant other;child(phu), dependent   Able to Return to Prior Arrangements yes   Is patient able to care for self after discharge? Yes   Who are your caregiver(s) and their phone number(s)? SO: Rosy Pari [phone]   Patient's perception of discharge disposition home or selfcare   Readmission Within The Last 30 Days no previous admission in last 30 days   Patient currently being followed by outpatient case management? No   Patient currently receives any other outside agency services? No   Equipment Currently Used at Home none   Do you have any problems affording any of your prescribed medications? TBD  (no insurance )   Is the patient taking medications as prescribed? yes   Does the patient have transportation home? Yes   Transportation Available family or friend will provide;car   Dialysis Name and Scheduled days N/A   Does the patient receive services at the [facility]? No    Discharge Plan A Home   Discharge Plan B Home with family   Patient/Family In Agreement With Plan yes     Blanca Velázquez, RN Transitional Navigator  [phone]
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
## 2018-09-09 NOTE — CONSULTS
Full note to follow   Agree essentially with pulm consult - may be TB but unlikely   Agree with current abx     ID will order fungal serologies which may help with diagnosis

## 2018-09-09 NOTE — PROGRESS NOTES
Pt given sodium chloride. Pt states that he cannot cough up anything at the moment. Sputum cup left in pts room for future collection. Will continue to monitor.

## 2018-09-09 NOTE — NURSING
Patient request to have PRN pain medication. Medication not due until 1537. Heating/Ice pack was offered but patient refused, said he'll wait until 1537. Patient sitting on edge of bed talking to girlfriend. Sputum collected and sent to lab.
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
## 2018-09-09 NOTE — PROGRESS NOTES
Sputum med given. Sputum obtained in cup and sent to lab by RT. New sputum cup placed in pt room for future induction. Will continue to monitor.

## 2018-09-09 NOTE — ASSESSMENT & PLAN NOTE
Patient with likely pneumonia in 7/18 unclera on what treatment was given.  Now - represents with Right middle lung fields necrotizing pneumonia.  This could be TB but unlikely.  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  Cancer is also on the differential     1. Keep on Unasyn for now   2. Keep in respiratory isolation for now awaiting results of AFB stains   3. ID has sent fungal serology/ag testing for histo, aspergillus, and other molds     Thanks for the consult

## 2018-09-10 LAB
ALBUMIN SERPL BCP-MCNC: 3.5 G/DL
ALP SERPL-CCNC: 68 U/L
ALT SERPL W/O P-5'-P-CCNC: 36 U/L
ANION GAP SERPL CALC-SCNC: 9 MMOL/L
AST SERPL-CCNC: 21 U/L
BACTERIA SPEC AEROBE CULT: NORMAL
BASOPHILS # BLD AUTO: 0.03 K/UL
BASOPHILS NFR BLD: 0.4 %
BILIRUB SERPL-MCNC: 0.6 MG/DL
BUN SERPL-MCNC: 10 MG/DL
CALCIUM SERPL-MCNC: 9.6 MG/DL
CHLORIDE SERPL-SCNC: 104 MMOL/L
CO2 SERPL-SCNC: 23 MMOL/L
CREAT SERPL-MCNC: 0.7 MG/DL
DIFFERENTIAL METHOD: ABNORMAL
EOSINOPHIL # BLD AUTO: 0.2 K/UL
EOSINOPHIL NFR BLD: 2.6 %
ERYTHROCYTE [DISTWIDTH] IN BLOOD BY AUTOMATED COUNT: 11.6 %
EST. GFR  (AFRICAN AMERICAN): >60 ML/MIN/1.73 M^2
EST. GFR  (NON AFRICAN AMERICAN): >60 ML/MIN/1.73 M^2
GLUCOSE SERPL-MCNC: 185 MG/DL
GRAM STN SPEC: NORMAL
HAV IGM SERPL QL IA: NEGATIVE
HBV CORE IGM SERPL QL IA: NEGATIVE
HBV SURFACE AG SERPL QL IA: NEGATIVE
HCT VFR BLD AUTO: 41.7 %
HCV AB SERPL QL IA: NEGATIVE
HGB BLD-MCNC: 14.9 G/DL
HIV 1+2 AB+HIV1 P24 AG SERPL QL IA: NEGATIVE
IGE SERPL-ACNC: 468 IU/ML
LYMPHOCYTES # BLD AUTO: 2.6 K/UL
LYMPHOCYTES NFR BLD: 36.1 %
MAGNESIUM SERPL-MCNC: 1.8 MG/DL
MCH RBC QN AUTO: 30.2 PG
MCHC RBC AUTO-ENTMCNC: 35.7 G/DL
MCV RBC AUTO: 84 FL
MONOCYTES # BLD AUTO: 0.4 K/UL
MONOCYTES NFR BLD: 5.7 %
NEUTROPHILS # BLD AUTO: 4 K/UL
NEUTROPHILS NFR BLD: 54.8 %
PHOSPHATE SERPL-MCNC: 4.6 MG/DL
PLATELET # BLD AUTO: 307 K/UL
PMV BLD AUTO: 8.6 FL
POCT GLUCOSE: 159 MG/DL (ref 70–110)
POCT GLUCOSE: 224 MG/DL (ref 70–110)
POCT GLUCOSE: 243 MG/DL (ref 70–110)
POCT GLUCOSE: 250 MG/DL (ref 70–110)
POTASSIUM SERPL-SCNC: 3.6 MMOL/L
PROT SERPL-MCNC: 6.8 G/DL
RBC # BLD AUTO: 4.94 M/UL
SODIUM SERPL-SCNC: 136 MMOL/L
WBC # BLD AUTO: 7.32 K/UL

## 2018-09-10 PROCEDURE — 87206 SMEAR FLUORESCENT/ACID STAI: CPT

## 2018-09-10 PROCEDURE — 11000001 HC ACUTE MED/SURG PRIVATE ROOM

## 2018-09-10 PROCEDURE — 97803 MED NUTRITION INDIV SUBSEQ: CPT

## 2018-09-10 PROCEDURE — 80053 COMPREHEN METABOLIC PANEL: CPT

## 2018-09-10 PROCEDURE — 86480 TB TEST CELL IMMUN MEASURE: CPT

## 2018-09-10 PROCEDURE — 85025 COMPLETE CBC W/AUTO DIFF WBC: CPT

## 2018-09-10 PROCEDURE — 87305 ASPERGILLUS AG IA: CPT

## 2018-09-10 PROCEDURE — 25000242 PHARM REV CODE 250 ALT 637 W/ HCPCS: Performed by: FAMILY MEDICINE

## 2018-09-10 PROCEDURE — 25000003 PHARM REV CODE 250: Performed by: FAMILY MEDICINE

## 2018-09-10 PROCEDURE — 94640 AIRWAY INHALATION TREATMENT: CPT

## 2018-09-10 PROCEDURE — 63600175 PHARM REV CODE 636 W HCPCS: Performed by: FAMILY MEDICINE

## 2018-09-10 PROCEDURE — 83735 ASSAY OF MAGNESIUM: CPT

## 2018-09-10 PROCEDURE — 36415 COLL VENOUS BLD VENIPUNCTURE: CPT

## 2018-09-10 PROCEDURE — 87116 MYCOBACTERIA CULTURE: CPT

## 2018-09-10 PROCEDURE — 94761 N-INVAS EAR/PLS OXIMETRY MLT: CPT

## 2018-09-10 PROCEDURE — 84100 ASSAY OF PHOSPHORUS: CPT

## 2018-09-10 PROCEDURE — 87015 SPECIMEN INFECT AGNT CONCNTJ: CPT

## 2018-09-10 PROCEDURE — 63600175 PHARM REV CODE 636 W HCPCS: Performed by: STUDENT IN AN ORGANIZED HEALTH CARE EDUCATION/TRAINING PROGRAM

## 2018-09-10 PROCEDURE — 25000003 PHARM REV CODE 250: Performed by: STUDENT IN AN ORGANIZED HEALTH CARE EDUCATION/TRAINING PROGRAM

## 2018-09-10 RX ORDER — ACETAMINOPHEN 325 MG/1
650 TABLET ORAL EVERY 8 HOURS PRN
Status: DISCONTINUED | OUTPATIENT
Start: 2018-09-10 | End: 2018-09-11 | Stop reason: HOSPADM

## 2018-09-10 RX ORDER — KETOROLAC TROMETHAMINE 30 MG/ML
30 INJECTION, SOLUTION INTRAMUSCULAR; INTRAVENOUS
Status: DISCONTINUED | OUTPATIENT
Start: 2018-09-10 | End: 2018-09-11

## 2018-09-10 RX ADMIN — AMPICILLIN SODIUM AND SULBACTAM SODIUM 1.5 G: 2; 1 INJECTION, POWDER, FOR SOLUTION INTRAMUSCULAR; INTRAVENOUS at 08:09

## 2018-09-10 RX ADMIN — INSULIN ASPART 2 UNITS: 100 INJECTION, SOLUTION INTRAVENOUS; SUBCUTANEOUS at 09:09

## 2018-09-10 RX ADMIN — SENNOSIDES AND DOCUSATE SODIUM 1 TABLET: 8.6; 5 TABLET ORAL at 08:09

## 2018-09-10 RX ADMIN — AMPICILLIN SODIUM AND SULBACTAM SODIUM 1.5 G: 2; 1 INJECTION, POWDER, FOR SOLUTION INTRAMUSCULAR; INTRAVENOUS at 09:09

## 2018-09-10 RX ADMIN — SODIUM CHLORIDE 30 MG/ML INHALATION SOLUTION 4 ML: 30 SOLUTION INHALANT at 07:09

## 2018-09-10 RX ADMIN — AMPICILLIN SODIUM AND SULBACTAM SODIUM 1.5 G: 2; 1 INJECTION, POWDER, FOR SOLUTION INTRAMUSCULAR; INTRAVENOUS at 02:09

## 2018-09-10 RX ADMIN — SODIUM CHLORIDE 30 MG/ML INHALATION SOLUTION 4 ML: 30 SOLUTION INHALANT at 04:09

## 2018-09-10 RX ADMIN — INSULIN ASPART 4 UNITS: 100 INJECTION, SOLUTION INTRAVENOUS; SUBCUTANEOUS at 05:09

## 2018-09-10 RX ADMIN — KETOROLAC TROMETHAMINE 30 MG: 30 INJECTION, SOLUTION INTRAMUSCULAR at 09:09

## 2018-09-10 RX ADMIN — GUAIFENESIN AND CODEINE PHOSPHATE 5 ML: 100; 10 SOLUTION ORAL at 06:09

## 2018-09-10 RX ADMIN — AMPICILLIN SODIUM AND SULBACTAM SODIUM 1.5 G: 2; 1 INJECTION, POWDER, FOR SOLUTION INTRAMUSCULAR; INTRAVENOUS at 01:09

## 2018-09-10 RX ADMIN — INSULIN ASPART 4 UNITS: 100 INJECTION, SOLUTION INTRAVENOUS; SUBCUTANEOUS at 12:09

## 2018-09-10 RX ADMIN — SODIUM CHLORIDE 30 MG/ML INHALATION SOLUTION 4 ML: 30 SOLUTION INHALANT at 11:09

## 2018-09-10 NOTE — PROGRESS NOTES
[facility]  Infectious Disease  Progress Note    Patient Name: Andrea Fernández  MRN: [id number]  Admission Date: 9/7/2018  Length of Stay: 1 days  Attending Physician: Shayy Chin MD  Primary Care Provider: No primary care provider on file.    Isolation Status: Airborne  Assessment/Plan:      Cavitating mass in right upper lung lobe    Patient with likely pneumonia in 7/18 unclear on what treatment was given.  Now returning with rt middle lung fields necrotizing pneumonia.  This could be TB but unlikely, and regarding other bacteria, his procal is negative which is reassuring . 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 Cancer is also on the differential     - keep on amp/sulbactam for now, noted pulmonary suggestion for longer outpatient therapy with amoxi/clav  - keep in respiratory isolation for now awaiting while results of AFB stains (IGRA and TST also)  - will follow up on blood culture   - will follow up on Histo antigen, Beta D glucan, galactomannan            Thank you for your consult. I will follow-up with patient. Please contact us if you have any additional questions.    Deb Whitmore MD  Infectious Disease  [facility]    Subjective:     Principal Problem:Lung mass    HPI: Mr. Fernández is a 43 year old male with past medical history of newly diagnosed DMII, who presents with persistent cough, shortness of breath and chest pain since 7/31 when he went to the ED for the same problem and was treated for pneumonia.  Since then he has had persistent shortness of breath, cough, chest pain, sputum production (brown and blood tinged), and weight loss of 15 pounds.  When he presented to the ED he underwent a CT thorax which revealed a 3.3x2.9cm cavitary lesion in the RUL.  He denies travel outside of the [place], exposure to known TB patients, incarceration, homelessness, or other sick contacts.        Hospital/ICU Course:   He was admitted to the floor and subsequent workup for TB was initiated.  He was started on broad spectrum abx.  He was given TB skin test by primary team, quantiferon gold ordered, and AFB smears ordered x 3.  No leukocytosis, mild elevation of ESR (37) and CRP (21), hyperglycemia with elevated A1C.  Patient with minimal symptoms at present - no fevers or chills  - some cough but no significant sputum production     Interval History:    Cavitating lung lesion, still coughing up brown sputum    Review of Systems   Respiratory: Positive for cough.    Cardiovascular: Positive for chest pain.   All other systems reviewed and are negative.    Objective:     Vital Signs (Most Recent):  Temp: 98.1 °F (36.7 °C) (09/10/18 0749)  Pulse: 61 (09/10/18 0749)  Resp: 17 (09/10/18 0749)  BP: 126/70 (09/10/18 0749)  SpO2: 97 % (09/10/18 0749) Vital Signs (24h Range):  Temp:  [96 °F (35.6 °C)-98.1 °F (36.7 °C)] 98.1 °F (36.7 °C)  Pulse:  [52-78] 61  Resp:  [16-20] 17  SpO2:  [97 %-98 %] 97 %  BP: (108-133)/(65-85) 126/70     Weight: 91.2 kg (201 lb)  Body mass index is 30.56 kg/m².    Estimated Creatinine Clearance: 147.6 mL/min (based on SCr of 0.7 mg/dL).    Physical Exam   Constitutional: No distress.   Eyes: EOM are normal.   Neck: No JVD present.   Cardiovascular: Normal rate and regular rhythm.   Murmur heard.  Pulmonary/Chest: Effort normal. He has wheezes. He has rales.   Abdominal: Soft. Bowel sounds are normal. He exhibits no distension. There is no tenderness.   Skin: Skin is warm and dry. No rash noted. He is not diaphoretic.   Psychiatric: He has a normal mood and affect. His behavior is normal.       Significant Labs:   BMP:   Recent Labs   Lab  09/10/18   0441   GLU  185*   NA  136   K  3.6   CL  104   CO2  23   BUN  10   CREATININE  0.7   CALCIUM  9.6   MG  1.8     CBC:   Recent Labs   Lab  09/09/18   0458  09/10/18   0441   WBC  7.10  7.32   HGB  14.5  14.9   HCT  40.3  41.7   PLT  273   307     CMP:   Recent Labs   Lab  09/09/18   0458  09/10/18   0441   NA  133*  136   K  4.0  3.6   CL  100  104   CO2  25  23   GLU  285*  185*   BUN  10  10   CREATININE  0.7  0.7   CALCIUM  9.4  9.6   PROT  6.5  6.8   ALBUMIN  3.3*  3.5   BILITOT  0.7  0.6   ALKPHOS  66  68   AST  18  21   ALT  33  36   ANIONGAP  8  9   EGFRNONAA  >60  >60       Significant Imaging: I have reviewed all pertinent imaging results/findings within the past 24 hours.

## 2018-09-10 NOTE — PROGRESS NOTES
.Pharmacy New Medication Education    Patient accepted medication education.    Pharmacy educated patient on name and purpose of medications and possible side effects, using the teach-back method.     D/C acetaminophen tablet 650 mg   D/C albuterol-ipratropium 2.5 mg-0.5 mg/3 mL nebulizer solution 3 mL   D/C ampicillin-sulbactam (UNASYN) 1.5 g in sodium chloride 0.9% 100 mL IVPB   D/C dextrose 50% injection 12.5 g   D/C dextrose 50% injection 25 g   D/C glucagon (human recombinant) injection 1 mg   D/C glucose chewable tablet 16 g   D/C glucose chewable tablet 24 g   D/C guaifenesin-codeine 100-10 mg/5 ml syrup 5 mL   D/C ibuprofen tablet 800 mg   D/C influenza (FLUZONE QUADRIVALENT) vaccine 0.5 mL   D/C insulin aspart U-100 pen 1-10 Units   D/C insulin detemir U-100 pen 18 Units   D/C lidocaine 5 % patch 2 patch   D/C ondansetron disintegrating tablet 8 mg   D/C promethazine (PHENERGAN) 6.25 mg in dextrose 5 % 50 mL IVPB   D/C ramelteon tablet 8 mg   D/C senna-docusate 8.6-50 mg per tablet 1 tablet   D/C sodium chloride 0.9% flush 5 mL   D/C sodium chloride 3% nebulizer solution 4 mL   D/C sodium chloride 3% nebulizer solution 4 mL       Learners of pharmacy medication education included:  Patient    Patient +/- learner response:  Verbalized Understanding, Teachback

## 2018-09-10 NOTE — PLAN OF CARE
Problem: Patient Care Overview  Goal: Plan of Care Review  Outcome: Ongoing (interventions implemented as appropriate)  Pt's SpO2 97% on room air. No adverse reactions to aerosol tx. Unable to obtain sputum sample due to nonproductive cough. No respiratory distress noted. Continue to monitor SpO2.

## 2018-09-10 NOTE — CONSULTS
Food & Nutrition  Education    Diet Education: ADA  Time Spent: 15 minutes  Learners: pt/S.O.      Nutrition Education provided with handouts: ADA      Comments: Pt newly diagnosed diabetic. Will need a glucometer upon discharge. Educated pt on ADA diet. Discussed carbohydrate intake and foods that contains carbs and appropriate serving sizes.  Pt and significant other voiced understanding. Educate don label reading. Provided handouts.       All questions and concerns answered. Dietitian's contact information provided.       Follow-Up: 9/17/18    Please Re-consult as needed        Thanks!  EDNA Wheeler, LDN
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
## 2018-09-10 NOTE — PROGRESS NOTES
Progress Note   [facility]      Subjective:    Patient states that the cough syrup improved his cough and the pleuritic chest pain. Patient states that he otherwise feels well. Tolerating diet with no nausea or vomiting.     Review of Systems   Constitutional: Positive for weight loss. Negative for chills, diaphoresis, fever and malaise/fatigue.   HENT: Negative for congestion, nosebleeds and sore throat.    Eyes: Negative for blurred vision, double vision and photophobia.   Respiratory: Positive for cough, hemoptysis, sputum production and shortness of breath. Negative for wheezing.    Cardiovascular: Positive for chest pain. Negative for palpitations, orthopnea, leg swelling and PND.   Gastrointestinal: Negative for abdominal pain, blood in stool, constipation, diarrhea, melena, nausea and vomiting.   Genitourinary: Negative for dysuria, flank pain and frequency.   Musculoskeletal: Negative for back pain, joint pain, myalgias and neck pain.   Skin: Negative for rash.   Neurological: Negative for dizziness, sensory change, speech change, focal weakness, seizures, loss of consciousness, weakness and headaches.   Endo/Heme/Allergies: Negative for polydipsia.   Psychiatric/Behavioral: Negative.      Allergies: NKDA     Current Facility-Administered Medications   Medication Dose Route Frequency Provider Last Rate Last Dose    acetaminophen tablet 650 mg  650 mg Oral Q8H PRN Dylan Cedillo MD   650 mg at 09/08/18 2144    albuterol-ipratropium 2.5 mg-0.5 mg/3 mL nebulizer solution 3 mL  3 mL Nebulization Q6H PRN Dylan Cedillo MD        ampicillin-sulbactam (UNASYN) 1.5 g in sodium chloride 0.9% 100 mL IVPB  1.5 g Intravenous Q6H Jonathan Aguila  mL/hr at 09/10/18 0903 1.5 g at 09/10/18 0903    dextrose 50% injection 12.5 g  12.5 g Intravenous PRN Dylan Cedillo MD        dextrose 50% injection 25 g  25 g Intravenous PRN Dylan Cedillo MD        glucagon (human  recombinant) injection 1 mg  1 mg Intramuscular PRN Dylan Cedillo MD        glucose chewable tablet 16 g  16 g Oral PRN Dylan Cedillo MD        glucose chewable tablet 24 g  24 g Oral PRN Dylan Cedillo MD        guaifenesin-codeine 100-10 mg/5 ml syrup 5 mL  5 mL Oral Q8H PRN Jonathan Aguila MD   5 mL at 09/10/18 0605    ibuprofen tablet 800 mg  800 mg Oral Q8H PRN Diana Mclean MD        influenza (FLUZONE QUADRIVALENT) vaccine 0.5 mL  0.5 mL Intramuscular Prior to discharge Shayy Chin MD        insulin aspart U-100 pen 1-10 Units  1-10 Units Subcutaneous QID (AC + HS) PRN Dylan Cedillo MD   2 Units at 09/10/18 0907    insulin detemir U-100 pen 18 Units  18 Units Subcutaneous BID Diana Mclean MD   18 Units at 09/10/18 0908    lidocaine 5 % patch 2 patch  2 patch Transdermal Q24H Diana Mclean MD   2 patch at 09/09/18 2151    ondansetron disintegrating tablet 8 mg  8 mg Oral Q6H PRN Dylan Cedillo MD        promethazine (PHENERGAN) 6.25 mg in dextrose 5 % 50 mL IVPB  6.25 mg Intravenous Q6H PRN Dylan Cedillo MD        ramelteon tablet 8 mg  8 mg Oral Nightly PRN Dylan Cedillo MD        senna-docusate 8.6-50 mg per tablet 1 tablet  1 tablet Oral BID Dylan Cedillo MD   1 tablet at 09/09/18 2151    sodium chloride 0.9% flush 5 mL  5 mL Intravenous PRN Dylan Cedillo MD        sodium chloride 3% nebulizer solution 4 mL  4 mL Nebulization Once Dylan Cedillo MD   Stopped at 09/08/18 0330    sodium chloride 3% nebulizer solution 4 mL  4 mL Nebulization Q8H Shayy Chin MD   4 mL at 09/10/18 0749       Objective:    Vital Signs (Most Recent):  Vitals:    09/10/18 0749   BP: 126/70   Pulse: 61   Resp: 17   Temp: 98.1 °F (36.7 °C)       Physical Exam:  General: NAD  HEENT: Conjunctivae and EOM are normal. PERRL. No scleral icterus.   Neck: supple. No LAD. No masses. No thyromegaly. No bruits.  Cardio: RRR.  S1/S2, NO m/r/g. Chest tender to palpation.  Pulmonary: Clear to ausculatation b/l, no wheezes/rales/crackles.  Skin: No rashes or lesions. Multiple tattoos.   Abdomen: BS+ soft, non-tender, non-distended. No masses. No rebound/guarding. No hepatosplenomegaly.  Extremities: no cyanosis, clubbing, or edema. No rash or lesions. 2+ radial and dorsal;is pedal pulses. Capillary refills < 2 secs.   Neuro: A&Ox3. CN II-XII grossly intact. No decrease in strength. No decrease in sensation.    Laboratory:    Most Recent Data:  CBC:   Lab Results   Component Value Date    WBC 7.32 09/10/2018    HGB 14.9 09/10/2018    HCT 41.7 09/10/2018     09/10/2018    MCV 84 09/10/2018    RDW 11.6 09/10/2018     BMP:   Lab Results   Component Value Date     09/10/2018    K 3.6 09/10/2018     09/10/2018    CO2 23 09/10/2018    BUN 10 09/10/2018     (H) 09/10/2018    CALCIUM 9.6 09/10/2018    MG 1.8 09/10/2018    PHOS 4.6 (H) 09/10/2018     LFTs:   Lab Results   Component Value Date    PROT 6.8 09/10/2018    ALBUMIN 3.5 09/10/2018    BILITOT 0.6 09/10/2018    AST 21 09/10/2018    ALKPHOS 68 09/10/2018    ALT 36 09/10/2018     Coags: No results found for: INR, PROTIME, PTT  FLP:   Lab Results   Component Value Date    CHOL 131 09/09/2018    HDL 23 (L) 09/09/2018    LDLCALC 78.8 09/09/2018    TRIG 146 09/09/2018    CHOLHDL 17.6 (L) 09/09/2018     DM:   Lab Results   Component Value Date    HGBA1C 11.4 (H) 09/08/2018    HGBA1C 11.4 (H) 09/08/2018    LDLCALC 78.8 09/09/2018    CREATININE 0.7 09/10/2018     HgbA1c:   Lab Results   Component Value Date    HGBA1C 11.4 (H) 09/08/2018    HGBA1C 11.4 (H) 09/08/2018     Thyroid:   Lab Results   Component Value Date    TSH 0.036 (L) 09/08/2018     Anemia: No results found for: IRON, TIBC, FERRITIN, CADPABYI65, FOLATE  Cardiac:   Lab Results   Component Value Date    TROPONINI <0.006 09/09/2018     Trended Lab Data:  Recent Labs   Lab  09/07/18   2153  09/09/18   0453   09/10/18   0441   WBC  9.34  7.10  7.32   HGB  14.8  14.5  14.9   HCT  40.8  40.3  41.7   PLT  309  273  307   MCV  84  84  84   RDW  11.5  11.6  11.6   NA  133*  133*  136   K  4.0  4.0  3.6   CL  100  100  104   CO2  21*  25  23   BUN  15  10  10   GLU  429*  285*  185*   PROT  7.3  6.5  6.8   ALBUMIN  3.6  3.3*  3.5   BILITOT  0.4  0.7  0.6   AST  23  18  21   ALKPHOS  84  66  68   ALT  31  33  36       Microbiology Results (last 7 days)     Procedure Component Value Units Date/Time    Culture, Respiratory with Gram Stain [[id number]] Collected:  09/08/18 1038    Order Status:  Completed Specimen:  Respiratory from Sputum Updated:  09/10/18 0915     Respiratory Culture Normal respiratory lenny     Gram Stain (Respiratory) <10 epithelial cells per low power field.     Gram Stain (Respiratory) Rare WBC's     Gram Stain (Respiratory) Moderate Gram negative rods     Gram Stain (Respiratory) Moderate Gram positive cocci    Blood culture [[id number]] Collected:  09/08/18 0346    Order Status:  Completed Specimen:  Blood Updated:  09/10/18 0812     Blood Culture, Routine No Growth to date     Blood Culture, Routine No Growth to date     Blood Culture, Routine No Growth to date    Blood culture [[id number]] Collected:  09/08/18 0346    Order Status:  Completed Specimen:  Blood Updated:  09/10/18 0812     Blood Culture, Routine No Growth to date     Blood Culture, Routine No Growth to date     Blood Culture, Routine No Growth to date    AFB Culture & Smear [[id number]]     Order Status:  No result Specimen:  Sputum, Induced     AFB Culture & Smear [[id number]]     Order Status:  No result Specimen:  Sputum, Induced     AFB Culture & Smear [[id number]] Collected:  09/08/18 1016    Order Status:  Completed Specimen:  Body Fluid from Sputum, Induced Updated:  09/09/18 2127     AFB Culture & Smear Culture in progress    Urine culture [[id number]] Collected:  09/08/18 0842    Order Status:  Completed Specimen:  Urine, Clean Catch  Updated:  09/09/18 1955     Urine Culture, Routine No growth    AFB Culture & Smear [[id number]] Collected:  09/09/18 1341    Order Status:  Sent Specimen:  Respiratory from Sputum, Induced Updated:  09/09/18 1634    AFB Culture & Smear [[id number]]     Order Status:  No result Specimen:  Sputum, Induced     KOH prep [[id number]] Collected:  09/08/18 2210    Order Status:  Completed Specimen:  Body Fluid from Sputum Updated:  09/09/18 0151     KOH Prep No yeast or fungal elements seen    Fungus culture [[id number]] Collected:  09/08/18 2210    Order Status:  Sent Specimen:  Body Fluid from Sputum Updated:  09/09/18 0021    AFB Culture & Smear [[id number]]     Order Status:  No result Specimen:  Sputum, Induced     AFB Culture & Smear [[id number]] Collected:  09/08/18 1016    Order Status:  Canceled Specimen:  Body Fluid from Sputum, Induced Updated:  09/08/18 1220    AFB Culture & Smear [[id number]] Collected:  09/08/18 1016    Order Status:  Canceled Specimen:  Body Fluid from Sputum, Induced Updated:  09/08/18 1220          Urinalysis:   Lab Results   Component Value Date    LABURIN No growth 09/08/2018    COLORU Yellow 09/08/2018    SPECGRAV 1.025 09/08/2018    NITRITE Negative 09/08/2018    PROTEINUR trace 12/19/2016    KETONESU Negative 09/08/2018    UROBILINOGEN Negative 09/08/2018    BILIRUBINUR neg 12/19/2016     Radiology:   CT Chest With Contrast   Final Result   Abnormal      3.3 x 2.9 cavitary masslike opacity in the right upper lobe and ill-defined nodular density in the left upper lobe.  Differential considerations remain between neoplasm and infection.  Follow-up with pulmonary medicine and follow-up to resolution is recommended.      Trace right-sided pleural effusion.      Cholelithiasis.      Additional findings as above.      This report was flagged in Epic as abnormal.         Electronically signed by: Fabian Lorenzana MD   Date:    09/08/2018   Time:    00:31      X-Ray Chest PA And Lateral   Final Result    Abnormal      Persistent masslike opacity in the right upper lung field.  Given persistence of this finding over the last 5 weeks, malignancy is included in the differential in addition to infectious or inflammatory processes.  Recommend further evaluation with chest CT with IV contrast when clinically appropriate.      This report was flagged in Epic as abnormal.         Electronically signed by: Jakub Cabral MD   Date:    09/07/2018   Time:    21:28          Right upper lobe mass        Assessment/Plan    44 y/o M with DM2 presents with right cavitary lung mass measuring 3.3 x 2.9 in right upper lobe indentified by CT Chest. Patient returned to ED for continued symptomology including cough, night sweats and blood-tinged sputum following treatment of suspected pneumonia one month prior. Possible differential at this time includes Necrotizing Pneumonia vs.TB vs. Histoplasmosis vs. Blastomycosis vs Aspergillosis vs. Malignancy. Patient on Day 3 Unasyn 1.5g q6h per pulmonary recs. Patient is currently afebrile and WBC within normal limits in stable condition.      Right upper lobe mass   Continue Unasyn 1.6g q6hr per pulm recs  Continue Isolation precautions   Pending induced sputum samples for AFB  F/u Quantgold  F/u Blasto, Histo and Aspergillus labs  HIV: Neg   RPR and Hepatitis panel   Infectious disease: Continue Unasyn, Pending AFB stains and fungal serology. More concerning for necrotizing pneumonia.  Pulm Consult: Particular interest in HIV and at least one AFB to expedite dispo    Type 2 Diabetes  Newly diagnosed on this admission   A1c 11.4 on 9/8/18  Continue diabetic diet   Detemir 20 units daily   Continue SSI    Atypical Chest Pain -  likely musculoskeletal   -Chest wall tedner to palpation  EKG - no significant findings  Troponin normal     PPx:JOCE Estrada MD   [facility], PGY-1

## 2018-09-10 NOTE — ASSESSMENT & PLAN NOTE
Patient with likely pneumonia in 7/18 unclear on what treatment was given.  Now returning with rt middle lung fields necrotizing pneumonia.  This could be TB but unlikely, and regarding other bacteria, his procal is negative which is reassuring . 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 Cancer is also on the differential     - keep on amp/sulbactam for now, noted pulmonary suggestion for longer outpatient therapy with amoxi/clav  - keep in respiratory isolation for now awaiting while results of AFB stains (IGRA and TST also)  - will follow up on blood culture   - will follow up on Histo antigen, Beta D glucan, galactomannan

## 2018-09-10 NOTE — SUBJECTIVE & OBJECTIVE
Interval History:    Cavitating lung lesion, still coughing up brown sputum    Review of Systems   Respiratory: Positive for cough.    Cardiovascular: Positive for chest pain.   All other systems reviewed and are negative.    Objective:     Vital Signs (Most Recent):  Temp: 98.1 °F (36.7 °C) (09/10/18 0749)  Pulse: 61 (09/10/18 0749)  Resp: 17 (09/10/18 0749)  BP: 126/70 (09/10/18 0749)  SpO2: 97 % (09/10/18 0749) Vital Signs (24h Range):  Temp:  [96 °F (35.6 °C)-98.1 °F (36.7 °C)] 98.1 °F (36.7 °C)  Pulse:  [52-78] 61  Resp:  [16-20] 17  SpO2:  [97 %-98 %] 97 %  BP: (108-133)/(65-85) 126/70     Weight: 91.2 kg (201 lb)  Body mass index is 30.56 kg/m².    Estimated Creatinine Clearance: 147.6 mL/min (based on SCr of 0.7 mg/dL).    Physical Exam   Constitutional: No distress.   Eyes: EOM are normal.   Neck: No JVD present.   Cardiovascular: Normal rate and regular rhythm.   Murmur heard.  Pulmonary/Chest: Effort normal. He has wheezes. He has rales.   Abdominal: Soft. Bowel sounds are normal. He exhibits no distension. There is no tenderness.   Skin: Skin is warm and dry. No rash noted. He is not diaphoretic.   Psychiatric: He has a normal mood and affect. His behavior is normal.       Significant Labs:   BMP:   Recent Labs   Lab  09/10/18   0441   GLU  185*   NA  136   K  3.6   CL  104   CO2  23   BUN  10   CREATININE  0.7   CALCIUM  9.6   MG  1.8     CBC:   Recent Labs   Lab  09/09/18   0458  09/10/18   0441   WBC  7.10  7.32   HGB  14.5  14.9   HCT  40.3  41.7   PLT  273  307     CMP:   Recent Labs   Lab  09/09/18   0458  09/10/18   0441   NA  133*  136   K  4.0  3.6   CL  100  104   CO2  25  23   GLU  285*  185*   BUN  10  10   CREATININE  0.7  0.7   CALCIUM  9.4  9.6   PROT  6.5  6.8   ALBUMIN  3.3*  3.5   BILITOT  0.7  0.6   ALKPHOS  66  68   AST  18  21   ALT  33  36   ANIONGAP  8  9   EGFRNONAA  >60  >60       Significant Imaging: I have reviewed all pertinent imaging results/findings within the past 24  hours.

## 2018-09-11 ENCOUNTER — DOCUMENTATION ONLY (OUTPATIENT)
Dept: PHARMACY | Facility: CLINIC | Age: 43
End: 2018-09-11

## 2018-09-11 VITALS
SYSTOLIC BLOOD PRESSURE: 116 MMHG | BODY MASS INDEX: 30.46 KG/M2 | TEMPERATURE: 96 F | HEART RATE: 50 BPM | RESPIRATION RATE: 18 BRPM | WEIGHT: 201 LBS | DIASTOLIC BLOOD PRESSURE: 63 MMHG | HEIGHT: 68 IN | OXYGEN SATURATION: 98 %

## 2018-09-11 LAB
A FUMIGATUS IGE QN: 0.49 KU/L
ALBUMIN SERPL BCP-MCNC: 3.4 G/DL
ALP SERPL-CCNC: 63 U/L
ALT SERPL W/O P-5'-P-CCNC: 38 U/L
ANION GAP SERPL CALC-SCNC: 10 MMOL/L
AST SERPL-CCNC: 23 U/L
BASOPHILS # BLD AUTO: 0.01 K/UL
BASOPHILS NFR BLD: 0.2 %
BILIRUB SERPL-MCNC: 0.6 MG/DL
BUN SERPL-MCNC: 15 MG/DL
CALCIUM SERPL-MCNC: 9.5 MG/DL
CHLORIDE SERPL-SCNC: 104 MMOL/L
CO2 SERPL-SCNC: 22 MMOL/L
CREAT SERPL-MCNC: 0.7 MG/DL
DEPRECATED A FUMIGATUS IGE RAST QL: ABNORMAL
DIFFERENTIAL METHOD: ABNORMAL
EOSINOPHIL # BLD AUTO: 0.2 K/UL
EOSINOPHIL NFR BLD: 3 %
ERYTHROCYTE [DISTWIDTH] IN BLOOD BY AUTOMATED COUNT: 11.9 %
EST. GFR  (AFRICAN AMERICAN): >60 ML/MIN/1.73 M^2
EST. GFR  (NON AFRICAN AMERICAN): >60 ML/MIN/1.73 M^2
GALACTOMANNAN AG SERPL IA-ACNC: <0.5 INDEX
GALACTOMANNAN AG SERPL IA-ACNC: <0.5 INDEX
GLUCOSE SERPL-MCNC: 169 MG/DL
HCT VFR BLD AUTO: 42.7 %
HGB BLD-MCNC: 15.1 G/DL
HISTOPLASMA AG VALUE: 0 NG/ML
HISTOPLASMA ANTIGEN URINE: NEGATIVE
LYMPHOCYTES # BLD AUTO: 2.9 K/UL
LYMPHOCYTES NFR BLD: 45.7 %
MAGNESIUM SERPL-MCNC: 1.8 MG/DL
MCH RBC QN AUTO: 30.1 PG
MCHC RBC AUTO-ENTMCNC: 35.4 G/DL
MCV RBC AUTO: 85 FL
MONOCYTES # BLD AUTO: 0.4 K/UL
MONOCYTES NFR BLD: 6.8 %
NEUTROPHILS # BLD AUTO: 2.8 K/UL
NEUTROPHILS NFR BLD: 44 %
PHOSPHATE SERPL-MCNC: 4.5 MG/DL
PLATELET # BLD AUTO: 312 K/UL
PMV BLD AUTO: 8.8 FL
POCT GLUCOSE: 173 MG/DL (ref 70–110)
POCT GLUCOSE: 175 MG/DL (ref 70–110)
POTASSIUM SERPL-SCNC: 3.6 MMOL/L
PROT SERPL-MCNC: 6.7 G/DL
RBC # BLD AUTO: 5.01 M/UL
SODIUM SERPL-SCNC: 136 MMOL/L
TB INDURATION 48 - 72 HR READ: 60 MM
WBC # BLD AUTO: 6.43 K/UL

## 2018-09-11 PROCEDURE — 80053 COMPREHEN METABOLIC PANEL: CPT

## 2018-09-11 PROCEDURE — 25000242 PHARM REV CODE 250 ALT 637 W/ HCPCS: Performed by: FAMILY MEDICINE

## 2018-09-11 PROCEDURE — 94640 AIRWAY INHALATION TREATMENT: CPT

## 2018-09-11 PROCEDURE — 36415 COLL VENOUS BLD VENIPUNCTURE: CPT

## 2018-09-11 PROCEDURE — 25000003 PHARM REV CODE 250: Performed by: STUDENT IN AN ORGANIZED HEALTH CARE EDUCATION/TRAINING PROGRAM

## 2018-09-11 PROCEDURE — 90471 IMMUNIZATION ADMIN: CPT | Performed by: FAMILY MEDICINE

## 2018-09-11 PROCEDURE — 90715 TDAP VACCINE 7 YRS/> IM: CPT | Performed by: STUDENT IN AN ORGANIZED HEALTH CARE EDUCATION/TRAINING PROGRAM

## 2018-09-11 PROCEDURE — 85025 COMPLETE CBC W/AUTO DIFF WBC: CPT

## 2018-09-11 PROCEDURE — 63600175 PHARM REV CODE 636 W HCPCS: Performed by: FAMILY MEDICINE

## 2018-09-11 PROCEDURE — 63600175 PHARM REV CODE 636 W HCPCS: Performed by: STUDENT IN AN ORGANIZED HEALTH CARE EDUCATION/TRAINING PROGRAM

## 2018-09-11 PROCEDURE — 90686 IIV4 VACC NO PRSV 0.5 ML IM: CPT | Performed by: FAMILY MEDICINE

## 2018-09-11 PROCEDURE — 25000003 PHARM REV CODE 250: Performed by: FAMILY MEDICINE

## 2018-09-11 PROCEDURE — 90472 IMMUNIZATION ADMIN EACH ADD: CPT | Performed by: STUDENT IN AN ORGANIZED HEALTH CARE EDUCATION/TRAINING PROGRAM

## 2018-09-11 PROCEDURE — 83735 ASSAY OF MAGNESIUM: CPT

## 2018-09-11 PROCEDURE — 94761 N-INVAS EAR/PLS OXIMETRY MLT: CPT

## 2018-09-11 PROCEDURE — 3E0234Z INTRODUCTION OF SERUM, TOXOID AND VACCINE INTO MUSCLE, PERCUTANEOUS APPROACH: ICD-10-PCS | Performed by: FAMILY MEDICINE

## 2018-09-11 PROCEDURE — 63600150 PHARM REV CODE 636: Performed by: FAMILY MEDICINE

## 2018-09-11 PROCEDURE — 84100 ASSAY OF PHOSPHORUS: CPT

## 2018-09-11 RX ORDER — KETOROLAC TROMETHAMINE 30 MG/ML
30 INJECTION, SOLUTION INTRAMUSCULAR; INTRAVENOUS EVERY 6 HOURS PRN
Status: DISCONTINUED | OUTPATIENT
Start: 2018-09-11 | End: 2018-09-11 | Stop reason: HOSPADM

## 2018-09-11 RX ORDER — AMOXICILLIN AND CLAVULANATE POTASSIUM 875; 125 MG/1; MG/1
1 TABLET, FILM COATED ORAL EVERY 12 HOURS
Qty: 112 TABLET | Refills: 0 | Status: SHIPPED | OUTPATIENT
Start: 2018-09-11 | End: 2018-11-06

## 2018-09-11 RX ORDER — METFORMIN HYDROCHLORIDE 500 MG/1
500 TABLET ORAL
Qty: 90 TABLET | Refills: 3 | Status: SHIPPED | OUTPATIENT
Start: 2018-09-11 | End: 2020-01-10

## 2018-09-11 RX ADMIN — AMPICILLIN SODIUM AND SULBACTAM SODIUM 1.5 G: 2; 1 INJECTION, POWDER, FOR SOLUTION INTRAMUSCULAR; INTRAVENOUS at 03:09

## 2018-09-11 RX ADMIN — AMPICILLIN SODIUM AND SULBACTAM SODIUM 1.5 G: 2; 1 INJECTION, POWDER, FOR SOLUTION INTRAMUSCULAR; INTRAVENOUS at 09:09

## 2018-09-11 RX ADMIN — SODIUM CHLORIDE 30 MG/ML INHALATION SOLUTION 4 ML: 30 SOLUTION INHALANT at 09:09

## 2018-09-11 RX ADMIN — SENNOSIDES AND DOCUSATE SODIUM 1 TABLET: 8.6; 5 TABLET ORAL at 09:09

## 2018-09-11 RX ADMIN — INFLUENZA A VIRUS A/MICHIGAN/45/2015 X-275 (H1N1) ANTIGEN (FORMALDEHYDE INACTIVATED), INFLUENZA A VIRUS A/SINGAPORE/INFIMH-16-0019/2016 IVR-186 (H3N2) ANTIGEN (FORMALDEHYDE INACTIVATED), INFLUENZA B VIRUS B/PHUKET/3073/2013 ANTIGEN (FORMALDEHYDE INACTIVATED), AND INFLUENZA B VIRUS B/MARYLAND/15/2016 BX-69A ANTIGEN (FORMALDEHYDE INACTIVATED) 0.5 ML: 15; 15; 15; 15 INJECTION, SUSPENSION INTRAMUSCULAR at 05:09

## 2018-09-11 RX ADMIN — INSULIN ASPART 2 UNITS: 100 INJECTION, SOLUTION INTRAVENOUS; SUBCUTANEOUS at 12:09

## 2018-09-11 RX ADMIN — CLOSTRIDIUM TETANI TOXOID ANTIGEN (FORMALDEHYDE INACTIVATED), CORYNEBACTERIUM DIPHTHERIAE TOXOID ANTIGEN (FORMALDEHYDE INACTIVATED), BORDETELLA PERTUSSIS TOXOID ANTIGEN (GLUTARALDEHYDE INACTIVATED), BORDETELLA PERTUSSIS FILAMENTOUS HEMAGGLUTININ ANTIGEN (FORMALDEHYDE INACTIVATED), BORDETELLA PERTUSSIS PERTACTIN ANTIGEN, AND BORDETELLA PERTUSSIS FIMBRIAE 2/3 ANTIGEN 0.5 ML: 5; 2; 2.5; 5; 3; 5 INJECTION, SUSPENSION INTRAMUSCULAR at 05:09

## 2018-09-11 RX ADMIN — INSULIN ASPART 2 UNITS: 100 INJECTION, SOLUTION INTRAVENOUS; SUBCUTANEOUS at 09:09

## 2018-09-11 RX ADMIN — AMPICILLIN SODIUM AND SULBACTAM SODIUM 1.5 G: 2; 1 INJECTION, POWDER, FOR SOLUTION INTRAMUSCULAR; INTRAVENOUS at 01:09

## 2018-09-11 NOTE — SUBJECTIVE & OBJECTIVE
Interval History:     No new issues    Review of Systems   Respiratory: Positive for cough.      Objective:     Vital Signs (Most Recent):  Temp: (!) 95.7 °F (35.4 °C) (09/11/18 0800)  Pulse: 61 (09/11/18 0914)  Resp: 18 (09/11/18 0914)  BP: 116/63 (09/11/18 0800)  SpO2: 98 % (09/11/18 0914) Vital Signs (24h Range):  Temp:  [95.7 °F (35.4 °C)-98.2 °F (36.8 °C)] 95.7 °F (35.4 °C)  Pulse:  [48-88] 61  Resp:  [12-18] 18  SpO2:  [98 %-100 %] 98 %  BP: (109-117)/(63-74) 116/63     Weight: 91.2 kg (201 lb)  Body mass index is 30.56 kg/m².    Estimated Creatinine Clearance: 147.6 mL/min (based on SCr of 0.7 mg/dL).    Physical Exam   Constitutional: He is oriented to person, place, and time.   Neck: No JVD present.   Pulmonary/Chest: Effort normal and breath sounds normal. No stridor. He has no wheezes. He has no rales.   Abdominal: Soft. Bowel sounds are normal. He exhibits no distension. There is no tenderness.   Musculoskeletal: He exhibits no edema.   Neurological: He is alert and oriented to person, place, and time.   Skin: Skin is warm and dry.   Psychiatric: He has a normal mood and affect. His behavior is normal.   Vitals reviewed.      Significant Labs:   BMP:   Recent Labs   Lab  09/11/18 0512   GLU  169*   NA  136   K  3.6   CL  104   CO2  22*   BUN  15   CREATININE  0.7   CALCIUM  9.5   MG  1.8     CBC:   Recent Labs   Lab  09/10/18   0441  09/11/18   0512   WBC  7.32  6.43   HGB  14.9  15.1   HCT  41.7  42.7   PLT  307  312     CMP:   Recent Labs   Lab  09/10/18   0441  09/11/18   0512   NA  136  136   K  3.6  3.6   CL  104  104   CO2  23  22*   GLU  185*  169*   BUN  10  15   CREATININE  0.7  0.7   CALCIUM  9.6  9.5   PROT  6.8  6.7   ALBUMIN  3.5  3.4*   BILITOT  0.6  0.6   ALKPHOS  68  63   AST  21  23   ALT  36  38   ANIONGAP  9  10   EGFRNONAA  >60  >60       Significant Imaging: I have reviewed all pertinent imaging results/findings within the past 24 hours.

## 2018-09-11 NOTE — NURSING
Home Oxygen Evaluation    Date Performed: 2018    1) Patient's Home O2 Sat on room air, while at rest: 98%        If O2 sats on room air at rest are 88% or below, patient qualifies. No additional testing needed. Document N/A in steps 2 and 3. If 89% or above, complete steps 2.      2) Patient's O2 Sat on room air while exercisin%        If O2 sats on room air while exercising remain 89% or above patient does not qualify, no further testing needed Document N/A in step 3. If O2 sats on room air while exercising are 88% or below, continue to step 3.

## 2018-09-11 NOTE — NURSING
Iv site removed. No active bleeding noted. Tele monitor removed for discharge. Discharge instructions and educational printouts given to pt and discussed thoroughly with patient and wife. Both verbalized clear understanding of all discussed. At present no distress noted. Patient d/c'd via /wc with escort service and family with all of patient's belongings.

## 2018-09-11 NOTE — PLAN OF CARE
Problem: Patient Care Overview  Goal: Plan of Care Review  Outcome: Revised  Airborne Precautions maintained. IV Toradol ad ministered for pain. Pt refuse Lidocaine patch. Blood glucose monitored. SSI given. IV antibiotics given. PT remains SB-SR on tele. No true red alarms noted. Fall precautions maintained.

## 2018-09-11 NOTE — PLAN OF CARE
Problem: Patient Care Overview  Goal: Plan of Care Review  Outcome: Ongoing (interventions implemented as appropriate)  Pt on RA with documented sats. The proper method of use, as well as anticipated side effects, of this aerosol treatment are discussed and demonstrated to the patient. Will continue to monitor.

## 2018-09-11 NOTE — PROGRESS NOTES
[facility]  Infectious Disease  Progress Note    Patient Name: Andrea Fernández  MRN: [id number]  Admission Date: 9/7/2018  Length of Stay: 2 days  Attending Physician: Shayy Chin MD  Primary Care Provider: Primary Doctor No    Isolation Status: Airborne  Assessment/Plan:      Cavitating mass in right upper lung lobe    Patient with likely pneumonia in 7/18 unclear on what treatment was given.  Now returning with rt middle lung fields necrotizing pneumonia. This could be TB but unlikely, and regarding other bacteria, his procal is negative which is reassuring . 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 Cancer is also on the differential     Recs  - can discharge on amoxiclav as per pulm suggestion for that duration also  - needs follow up with CT as outpatient         Thank you for your consult. I will follow-up with patient. Please contact us if you have any additional questions.    Deb Whitmore MD  Infectious Disease  [facility]    Subjective:     Principal Problem:Lung mass    HPI: Mr. Fernández is a 43 year old male with past medical history of newly diagnosed DMII, who presents with persistent cough, shortness of breath and chest pain since 7/31 when he went to the ED for the same problem and was treated for pneumonia.  Since then he has had persistent shortness of breath, cough, chest pain, sputum production (brown and blood tinged), and weight loss of 15 pounds.  When he presented to the ED he underwent a CT thorax which revealed a 3.3x2.9cm cavitary lesion in the RUL.  He denies travel outside of the [place], exposure to known TB patients, incarceration, homelessness, or other sick contacts.       Hospital/ICU Course:   He was admitted to the floor and subsequent workup for TB was initiated.  He was started on broad spectrum abx.  He was given TB skin test by primary  team, quantiferon gold ordered, and AFB smears ordered x 3.  No leukocytosis, mild elevation of ESR (37) and CRP (21), hyperglycemia with elevated A1C.  Patient with minimal symptoms at present - no fevers or chills  - some cough but no significant sputum production     Interval History:     No new issues    Review of Systems   Respiratory: Positive for cough.      Objective:     Vital Signs (Most Recent):  Temp: (!) 95.7 °F (35.4 °C) (09/11/18 0800)  Pulse: 61 (09/11/18 0914)  Resp: 18 (09/11/18 0914)  BP: 116/63 (09/11/18 0800)  SpO2: 98 % (09/11/18 0914) Vital Signs (24h Range):  Temp:  [95.7 °F (35.4 °C)-98.2 °F (36.8 °C)] 95.7 °F (35.4 °C)  Pulse:  [48-88] 61  Resp:  [12-18] 18  SpO2:  [98 %-100 %] 98 %  BP: (109-117)/(63-74) 116/63     Weight: 91.2 kg (201 lb)  Body mass index is 30.56 kg/m².    Estimated Creatinine Clearance: 147.6 mL/min (based on SCr of 0.7 mg/dL).    Physical Exam   Constitutional: He is oriented to person, place, and time.   Neck: No JVD present.   Pulmonary/Chest: Effort normal and breath sounds normal. No stridor. He has no wheezes. He has no rales.   Abdominal: Soft. Bowel sounds are normal. He exhibits no distension. There is no tenderness.   Musculoskeletal: He exhibits no edema.   Neurological: He is alert and oriented to person, place, and time.   Skin: Skin is warm and dry.   Psychiatric: He has a normal mood and affect. His behavior is normal.   Vitals reviewed.      Significant Labs:   BMP:   Recent Labs   Lab  09/11/18   0512   GLU  169*   NA  136   K  3.6   CL  104   CO2  22*   BUN  15   CREATININE  0.7   CALCIUM  9.5   MG  1.8     CBC:   Recent Labs   Lab  09/10/18   0441  09/11/18   0512   WBC  7.32  6.43   HGB  14.9  15.1   HCT  41.7  42.7   PLT  307  312     CMP:   Recent Labs   Lab  09/10/18   0441  09/11/18   0512   NA  136  136   K  3.6  3.6   CL  104  104   CO2  23  22*   GLU  185*  169*   BUN  10  15   CREATININE  0.7  0.7   CALCIUM  9.6  9.5   PROT  6.8  6.7    ALBUMIN  3.5  3.4*   BILITOT  0.6  0.6   ALKPHOS  68  63   AST  21  23   ALT  36  38   ANIONGAP  9  10   EGFRNONAA  >60  >60       Significant Imaging: I have reviewed all pertinent imaging results/findings within the past 24 hours.
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
## 2018-09-11 NOTE — ASSESSMENT & PLAN NOTE
Patient with likely pneumonia in 7/18 unclear on what treatment was given.  Now returning with rt middle lung fields necrotizing pneumonia. This could be TB but unlikely, and regarding other bacteria, his procal is negative which is reassuring . 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 Cancer is also on the differential     Recs  - d/c isolation since AFB and TST negative  - can discharge on amoxiclav for duration as per pulm suggestion   - will need repeat CT imaging as outpatient

## 2018-09-11 NOTE — PLAN OF CARE
TN gave appoinments and patient took delivery on DC meds, covered by patient assistance.    Follow-up With  Details  Why  Contact Info   Reid Cavazos MD  Go on 9/26/2018  @ 3:40pm, bring two proofs of income  [address]  [place] [id number]  [phone]   Katina Serrano MD  Go on 10/2/2018  @ 2:20pm, please bring two proofs of income  [address]  [address]  [place] [id number]  [phone]          09/11/18 1523   Final Note   Assessment Type Final Discharge Note   What phone number can be called within the next 1-3 days to see how you are doing after discharge? [phone]   Hospital Follow Up  Appt(s) scheduled? Yes   Discharge plans and expectations educations in teach back method with documentation complete? Yes   Right Care Referral Info   Post Acute Recommendation No Care

## 2018-09-11 NOTE — PROGRESS NOTES
Patient  is approved in our Nominal Pricing Program (Gold Card) enrollment effective thru 11/11/2018. He picked up Augmentin and Metformin free of charge today. Restrictions apply. Retail Pharmacy must obtain ( cost transfer form) approval from Patient Assistance Technician before filling any additional prescriptions

## 2018-09-11 NOTE — PROGRESS NOTES
Progress Note   [facility]      Subjective:    Patient states that he is improving. Patient is tolerating diet with no nausea or vomiting. Patient denies fever, chill, shortness of breath, chest pain. Patient has chest wall pain that occurs with coughing and movement. Cough has improved. Explained to patient the importance of follow up with Pulmonology and primary care. Patient expressed understanding.       Current Facility-Administered Medications   Medication Dose Route Frequency Provider Last Rate Last Dose    acetaminophen tablet 650 mg  650 mg Oral Q8H PRN Diana Mclean MD        albuterol-ipratropium 2.5 mg-0.5 mg/3 mL nebulizer solution 3 mL  3 mL Nebulization Q6H PRN Dylan Cedillo MD        ampicillin-sulbactam (UNASYN) 1.5 g in sodium chloride 0.9% 100 mL IVPB  1.5 g Intravenous Q6H Jonathan Aguila  mL/hr at 09/11/18 0902 1.5 g at 09/11/18 0902    dextrose 50% injection 12.5 g  12.5 g Intravenous PRN Dylan Cedillo MD        dextrose 50% injection 25 g  25 g Intravenous PRN Dylan Cedillo MD        glucagon (human recombinant) injection 1 mg  1 mg Intramuscular PRN Dylan Cedillo MD        glucose chewable tablet 16 g  16 g Oral PRN Dylan Cedillo MD        glucose chewable tablet 24 g  24 g Oral PRN Dylan Cedillo MD        guaifenesin-codeine 100-10 mg/5 ml syrup 5 mL  5 mL Oral Q8H PRN Jonathan Aguila MD   5 mL at 09/10/18 1832    influenza (FLUZONE QUADRIVALENT) vaccine 0.5 mL  0.5 mL Intramuscular Prior to discharge Shayy Chin MD        insulin aspart U-100 pen 1-10 Units  1-10 Units Subcutaneous QID (AC + HS) PRN Dylan Cedillo MD   2 Units at 09/11/18 0902    insulin detemir U-100 pen 18 Units  18 Units Subcutaneous BID Diana Mclean MD   18 Units at 09/11/18 0902    ketorolac injection 30 mg  30 mg Intravenous Q6H PRN Debra Estrada MD        lidocaine 5 % patch 2 patch  2 patch Transdermal Q24H  Diana Mclean MD   2 patch at 09/09/18 2151    ondansetron disintegrating tablet 8 mg  8 mg Oral Q6H PRN Dylan Cedillo MD        promethazine (PHENERGAN) 6.25 mg in dextrose 5 % 50 mL IVPB  6.25 mg Intravenous Q6H PRN Dylan Cedillo MD        ramelteon tablet 8 mg  8 mg Oral Nightly PRN Dylan Cedillo MD        senna-docusate 8.6-50 mg per tablet 1 tablet  1 tablet Oral BID Dylan Cedillo MD   1 tablet at 09/11/18 0902    sodium chloride 0.9% flush 5 mL  5 mL Intravenous PRN Dylan Cedillo MD        sodium chloride 3% nebulizer solution 4 mL  4 mL Nebulization Once Dylan Cedillo MD   Stopped at 09/08/18 0330    sodium chloride 3% nebulizer solution 4 mL  4 mL Nebulization Q8H Shayy Chin MD   4 mL at 09/11/18 0914       Objective:    Vital Signs (Most Recent):  Vitals:    09/11/18 0914   BP:    Pulse: 61   Resp: 18   Temp:        Physical Exam:  General: NAD  HEENT: Conjunctivae and EOM are normal. PERRL. No scleral icterus.   Neck: supple. No LAD. No masses. No thyromegaly. No bruits.  Cardio: RRR. S1/S2, NO m/r/g. Chest tender to palpation.  Pulmonary: Clear to ausculatation b/l, no wheezes/rales/crackles.  Skin: No rashes or lesions. Multiple tattoos.   Abdomen: BS+ soft, non-tender, non-distended. No masses. No rebound/guarding. No hepatosplenomegaly.  Extremities: no cyanosis, clubbing, or edema. No rash or lesions. 2+ radial and dorsal;is pedal pulses. Capillary refills < 2 secs.   Neuro: A&Ox3. CN II-XII grossly intact. No decrease in strength. No decrease in sensation.    Laboratory:    Most Recent Data:  CBC:   Lab Results   Component Value Date    WBC 6.43 09/11/2018    HGB 15.1 09/11/2018    HCT 42.7 09/11/2018     09/11/2018    MCV 85 09/11/2018    RDW 11.9 09/11/2018     BMP:   Lab Results   Component Value Date     09/11/2018    K 3.6 09/11/2018     09/11/2018    CO2 22 (L) 09/11/2018    BUN 15 09/11/2018     (H)  09/11/2018    CALCIUM 9.5 09/11/2018    MG 1.8 09/11/2018    PHOS 4.5 09/11/2018     LFTs:   Lab Results   Component Value Date    PROT 6.7 09/11/2018    ALBUMIN 3.4 (L) 09/11/2018    BILITOT 0.6 09/11/2018    AST 23 09/11/2018    ALKPHOS 63 09/11/2018    ALT 38 09/11/2018     Coags: No results found for: INR, PROTIME, PTT  FLP:   Lab Results   Component Value Date    CHOL 131 09/09/2018    HDL 23 (L) 09/09/2018    LDLCALC 78.8 09/09/2018    TRIG 146 09/09/2018    CHOLHDL 17.6 (L) 09/09/2018     DM:   Lab Results   Component Value Date    HGBA1C 11.4 (H) 09/08/2018    HGBA1C 11.4 (H) 09/08/2018    LDLCALC 78.8 09/09/2018    CREATININE 0.7 09/11/2018     HgbA1c:   Lab Results   Component Value Date    HGBA1C 11.4 (H) 09/08/2018    HGBA1C 11.4 (H) 09/08/2018     Thyroid:   Lab Results   Component Value Date    TSH 0.036 (L) 09/08/2018     Anemia: No results found for: IRON, TIBC, FERRITIN, [id number], FOLATE  Cardiac:   Lab Results   Component Value Date    TROPONINI <0.006 09/09/2018     Trended Lab Data:  Recent Labs   Lab  09/09/18   0458  09/10/18   0441  09/11/18   0512   WBC  7.10  7.32  6.43   HGB  14.5  14.9  15.1   HCT  40.3  41.7  42.7   PLT  273  307  312   MCV  84  84  85   RDW  11.6  11.6  11.9   NA  133*  136  136   K  4.0  3.6  3.6   CL  100  104  104   CO2  25  23  22*   BUN  10  10  15   GLU  285*  185*  169*   PROT  6.5  6.8  6.7   ALBUMIN  3.3*  3.5  3.4*   BILITOT  0.7  0.6  0.6   AST  18  21  23   ALKPHOS  66  68  63   ALT  33  36  38       Microbiology Results (last 7 days)     Procedure Component Value Units Date/Time    Blood culture [[id number]] Collected:  09/08/18 0346    Order Status:  Completed Specimen:  Blood Updated:  09/11/18 0812     Blood Culture, Routine No Growth to date     Blood Culture, Routine No Growth to date     Blood Culture, Routine No Growth to date     Blood Culture, Routine No Growth to date    Blood culture [[id number]] Collected:  09/08/18 0346    Order Status:   Completed Specimen:  Blood Updated:  09/11/18 0812     Blood Culture, Routine No Growth to date     Blood Culture, Routine No Growth to date     Blood Culture, Routine No Growth to date     Blood Culture, Routine No Growth to date    AFB Culture & Smear [[id number]]     Order Status:  No result Specimen:  Sputum, Induced     AFB Culture & Smear [[id number]] Collected:  09/10/18 1823    Order Status:  Sent Specimen:  Sputum, Induced Updated:  09/10/18 2311    AFB Culture & Smear [[id number]]     Order Status:  No result Specimen:  Sputum, Induced     AFB Culture & Smear [[id number]] Collected:  09/09/18 1341    Order Status:  Completed Specimen:  Respiratory from Sputum, Induced Updated:  09/10/18 2127     AFB Culture & Smear Culture in progress     AFB CULTURE STAIN No acid fast bacilli seen.    AFB Culture & Smear [[id number]] Collected:  09/08/18 1016    Order Status:  Completed Specimen:  Body Fluid from Sputum, Induced Updated:  09/10/18 1711     AFB Culture & Smear Culture in progress     AFB CULTURE STAIN No acid fast bacilli seen.    AFB Culture & Smear [[id number]]     Order Status:  No result Specimen:  Sputum, Induced     Culture, Respiratory with Gram Stain [[id number]] Collected:  09/08/18 1038    Order Status:  Completed Specimen:  Respiratory from Sputum Updated:  09/10/18 0915     Respiratory Culture Normal respiratory lenny     Gram Stain (Respiratory) <10 epithelial cells per low power field.     Gram Stain (Respiratory) Rare WBC's     Gram Stain (Respiratory) Moderate Gram negative rods     Gram Stain (Respiratory) Moderate Gram positive cocci    AFB Culture & Smear [[id number]]     Order Status:  No result Specimen:  Sputum, Induced     Urine culture [[id number]] Collected:  09/08/18 0842    Order Status:  Completed Specimen:  Urine, Clean Catch Updated:  09/09/18 1955     Urine Culture, Routine No growth    AFB Culture & Smear [[id number]]     Order Status:  No result Specimen:  Sputum, Induced     KOH  prep [[id number]] Collected:  09/08/18 2210    Order Status:  Completed Specimen:  Body Fluid from Sputum Updated:  09/09/18 0151     KOH Prep No yeast or fungal elements seen    Fungus culture [[id number]] Collected:  09/08/18 2210    Order Status:  Sent Specimen:  Body Fluid from Sputum Updated:  09/09/18 0021    AFB Culture & Smear [[id number]]     Order Status:  No result Specimen:  Sputum, Induced     AFB Culture & Smear [[id number]] Collected:  09/08/18 1016    Order Status:  Canceled Specimen:  Body Fluid from Sputum, Induced Updated:  09/08/18 1220    AFB Culture & Smear [[id number]] Collected:  09/08/18 1016    Order Status:  Canceled Specimen:  Body Fluid from Sputum, Induced Updated:  09/08/18 1220          Urinalysis:   Lab Results   Component Value Date    LABURIN No growth 09/08/2018    COLORU Yellow 09/08/2018    SPECGRAV 1.025 09/08/2018    NITRITE Negative 09/08/2018    PROTEINUR trace 12/19/2016    KETONESU Negative 09/08/2018    UROBILINOGEN Negative 09/08/2018    BILIRUBINUR neg 12/19/2016     Radiology:   CT Chest With Contrast   Final Result   Abnormal      3.3 x 2.9 cavitary masslike opacity in the right upper lobe and ill-defined nodular density in the left upper lobe.  Differential considerations remain between neoplasm and infection.  Follow-up with pulmonary medicine and follow-up to resolution is recommended.      Trace right-sided pleural effusion.      Cholelithiasis.      Additional findings as above.      This report was flagged in Epic as abnormal.         Electronically signed by: Fabian Lorenzana MD   Date:    09/08/2018   Time:    00:31      X-Ray Chest PA And Lateral   Final Result   Abnormal      Persistent masslike opacity in the right upper lung field.  Given persistence of this finding over the last 5 weeks, malignancy is included in the differential in addition to infectious or inflammatory processes.  Recommend further evaluation with chest CT with IV contrast when clinically  appropriate.      This report was flagged in Epic as abnormal.         Electronically signed by: Jakub Cabral MD   Date:    09/07/2018   Time:    21:28          Right upper lobe mass        Assessment/Plan    44 y/o M with DM2 presents with right cavitary lung mass measuring 3.3 x 2.9 in right upper lobe indentified by CT Chest. Patient returned to ED for continued symptomology including cough, night sweats and blood-tinged sputum following treatment of suspected pneumonia one month prior. Possible differential at this time includes Necrotizing Pneumonia vs.TB vs. Histoplasmosis vs. Blastomycosis vs Aspergillosis vs. Malignancy. Patient on Day 3 Unasyn 1.5g q6h per pulmonary recs. Patient is currently afebrile and WBC within normal limits in stable condition.      Right upper lobe mass   Continue Unasyn 1.6g q6hr per pulm recs  Continue Isolation precautions   Pending induced sputum samples for AFB  Quantgold Pending  PPD: Negative  F/u Blasto, Histo and Aspergillus labs  HIV: Neg, RPR: nonreactive and Hepatitis panel   Infectious disease: Continue Unasyn, Pending AFB stains and fungal serology. More concerning for necrotizing pneumonia. Pending 3rd AFB sputum.   Pulm Consult: Most likely anaerobic aspiration pneumonia. Discharge on 6 weeks of Augmentin and will follow up in chest clinic for repeat chest x-ray    Type 2 Diabetes  Newly diagnosed on this admission   A1c 11.4 on 9/8/18  Continue diabetic diet   Detemir 18 units daily   Glucose: 169  Continue SSI    Atypical Chest Pain -  likely musculoskeletal   -Chest wall tender to palpation and the pain is associated with cough  EKG - no significant findings  Troponin normal   Improved with Guaifenesin-Codeine    PPx:JOCE Estrada MD   [facility], PGY-1

## 2018-09-11 NOTE — PROGRESS NOTES
[facility] Resident [facility] Progress Note    Subjective:   Patient was resting comfortably in bed with significant other at bedside. Denies any subjective fever, chills, hemoptysis, chest pain, or sob overnight. Tolerating diet. PPD test was negative with 2/3 AFB smears negative.     Objective:   Last 24 Hour Vital Signs:  BP  Min: 109/74  Max: 122/78  Temp  Av.9 °F (36.1 °C)  Min: 96.3 °F (35.7 °C)  Max: 98.2 °F (36.8 °C)  Pulse  Av.6  Min: 48  Max: 88  Resp  Avg: 15.9  Min: 12  Max: 18  SpO2  Av.8 %  Min: 98 %  Max: 100 %  I/O last 3 completed shifts:  In: 811 [P.O.:611; IV Piggyback:200]  Out: 650 [Urine:650]    Physical Examination:  GEN: well developed, well nourished, resting in bed  HEENT: NC/AT, MMM  RESP: CTAB, no crackles, wheezing, rales, air entry equal bilaterally, no accessory muscle use.  CV: RRR, no M/R/G, pulses normal  ABD: soft, non tender, non distended, NABSx4  MUSC: no gross deformity, ROM intact  PSYCH: appropriate mood and affect  NEURO: moving all extremities, no gross deficit.    Laboratory:  Laboratory Data Reviewed: yes  Pertinent Findings:  Lab Results   Component Value Date    WBC 6.43 2018    HGB 15.1 2018    HCT 42.7 2018    MCV 85 2018     2018     Lab Results   Component Value Date    CREATININE 0.7 2018    BUN 15 2018     2018    K 3.6 2018     2018    CO2 22 (L) 2018         Microbiology Data Reviewed: yes  Pertinent Findings:  Blood culture x2 NGTD  AFB x 2 negative; awaiting third AFB      Radiology Data Reviewed: yes  Pertinent Findings:  All imaging reviewed     Current Medications:     Infusions:       Scheduled:   ampicillin-sulbactim (UNASYN) IVPB  1.5 g Intravenous Q6H    insulin detemir U-100  18 Units Subcutaneous BID    lidocaine  2 patch Transdermal Q24H    senna-docusate 8.6-50 mg  1 tablet Oral BID    sodium chloride 3%  4 mL Nebulization Once    sodium  chloride 3%  4 mL Nebulization Q8H        PRN:  acetaminophen, albuterol-ipratropium, dextrose 50%, dextrose 50%, glucagon (human recombinant), glucose, glucose, guaifenesin-codeine 100-10 mg/5 ml, influenza, insulin aspart U-100, ketorolac, ondansetron, promethazine (PHENERGAN) IVPB, ramelteon, sodium chloride 0.9%    Assessment:     Andrea Fernández is a 43 y.o.male with  Patient Active Problem List    Diagnosis Date Noted    Atypical chest pain     Cough     SOB (shortness of breath)     Lung mass 09/08/2018    Cavitating mass in right upper lung lobe 09/08/2018    Uncontrolled type 2 diabetes mellitus without complication, without long-term current use of insulin 12/19/2016        Plan:       1) RUL lesion concerning for TB vs fungal infection vs pneumonia vs malignancy  - given history of weight loss, non-resolving lesion despite antibiotic management for CAP, will pursue TB evaluation  - marijuana smoking increases predisposition to aspergillosis.  - f/u afb smears x 3, quantiferon gold, blood cultures, sputume cultures, histo and aspergillus labs, HIV, RPR, Hepatitis panel, blasto, fungal culture  - KOH negative, RPR negative,   - continue airborne precautions  - will schedule in pulmonology clinic for follow up  - if third AFB smear is negative, may discontinue airborne precautions; will need antibiotic coverage for likely bacterial process, consider Augmentin 875mg BID  for a total duration of one month.  Revaluate with chest CT in 6 weeks for resolution of capsule.     2) DMII  - immunocompromised status given uncontrolled DM.  - continue to adjust regimen to ensure better glycemic control.  - consider reduction in daily labs (D/C cbc, lfts).

## 2018-09-12 ENCOUNTER — PATIENT OUTREACH (OUTPATIENT)
Dept: ADMINISTRATIVE | Facility: CLINIC | Age: 43
End: 2018-09-12

## 2018-09-12 DIAGNOSIS — R05.9 COUGH IN ADULT PATIENT: ICD-10-CM

## 2018-09-12 DIAGNOSIS — J85.0 NECROTIZING PNEUMONIA: Primary | ICD-10-CM

## 2018-09-12 LAB
1,3 BETA GLUCAN SPEC-MCNC: 43 PG/ML
M TB IFN-G CD4+ BCKGRND COR BLD-ACNC: 0.02 IU/ML
MITOGEN IGNF BCKGRD COR BLD-ACNC: >10 IU/ML
MITOGEN IGNF BCKGRD COR BLD-ACNC: NEGATIVE [IU]/ML
NIL: 0.02 IU/ML
TB2 - NIL: 0.02 IU/ML

## 2018-09-12 RX ORDER — CODEINE PHOSPHATE AND GUAIFENESIN 10; 100 MG/5ML; MG/5ML
5 SOLUTION ORAL 3 TIMES DAILY PRN
Qty: 236 ML | Refills: 0 | Status: SHIPPED | OUTPATIENT
Start: 2018-09-12 | End: 2018-09-13 | Stop reason: SDUPTHER

## 2018-09-12 NOTE — PATIENT INSTRUCTIONS

## 2018-09-12 NOTE — PROGRESS NOTES
Pt called [employer] number and mentioned that he needed cough medicine, preferrably with codeine,    Cannot be e-prescribed.   Will print and leave at 5th floor nurses station.  Will call pt to notify.

## 2018-09-12 NOTE — PROGRESS NOTES
C3 nurse attempted to contact patient. No answer. The following message was left for the patient to return the call:  Good morning  I am a nurse calling on behalf of [facility] from the [facility].  This is a Transitional Care Call for Andrea Feránndez. When you have a moment please contact us at [phone] or [phone] Monday through Friday, between the hours of 8 am to 4 pm. We look forward to speaking with you. On behalf of [facility] have a nice day.    The patient has a scheduled HOSFU appointment with Reid Cavazos MD on 9/26/2018  @ 3:40pm

## 2018-09-13 LAB
BACTERIA BLD CULT: NORMAL
BACTERIA BLD CULT: NORMAL
BLASTOMYCES AG INTERP: NEGATIVE
BLASTOMYCES AG INTERP: NORMAL
BLASTOMYCES AG RESULT: NORMAL
BLASTOMYCES AG RESULT: NORMAL NG/ML
BLASTOMYCES AG SPECIMEN: NORMAL
BLASTOMYCES AG SPECIMEN: NORMAL
H CAPSUL AB SER QL ID: NEGATIVE
H CAPSUL MYC AB SER QL CF: NEGATIVE
H CAPSUL YST AB SER QL CF: NEGATIVE
HISTOPLASMA AG INTERP.: NEGATIVE
HISTOPLASMA RESULT: NORMAL NG/ML

## 2018-09-13 RX ORDER — CODEINE PHOSPHATE AND GUAIFENESIN 10; 100 MG/5ML; MG/5ML
5 SOLUTION ORAL 3 TIMES DAILY PRN
Qty: 236 ML | Refills: 0 | Status: SHIPPED | OUTPATIENT
Start: 2018-09-13 | End: 2018-09-23

## 2018-09-13 NOTE — DISCHARGE SUMMARY
"[facility]  Discharge Summary      Patient Name: Andrea Fernández  MRN: [id number]  Admission Date: 9/7/2018  Hospital Length of Stay: 2 days  Discharge Date and Time: 9/11/2018  6:31 PM  Attending Physician: Dr. Shayy Chin   Discharging Provider: Debra Estrada MD  Primary Care Provider: Primary Doctor No     HPI: 43 y.o. male with a pertinent PMH of prediabetes presents with a cough and pleuritic chest pain along with SOB. The patient has had a productive cough for the last month along with a small amount of blood tinged sputum that has not resolved after going to the ED on 7/31 when he was diagnosed with PNA. He was sent home with antibiotics but his cough did not resolve. He reported no fever, chills, or night sweats. He notes that his sputum is usually clear but sometimes its "brown". He denies smoking at any point in time and he reports no travel, exposure to immigrants, exposure to prisons, exposure to homeless shelters or homemless individuals. He reports no known exposure to TB or other individuals who are sick. He reports no other medical problems. He does work in a barn and works with horses. He also endorses a 15 pound weight loss in the last 2 months. He reported no SOB during questioning. CT in ED showed a 3.3x2.9 cm cavitary mass in the right upper lobe. He had no fevers or white count.       Hospital Course: Patient was started on isolation protocol for concerns for TB. T-spot, AFB sputum cultures x 3, blood cx, sputum cx, HIV, Aspergillus, RPR, Hep were ordered. Pulm and ID were consulted. Patient was started on broad-spectrum antibiotics. Pulm and ID were in agreement that patient more likely has a necrotizing pneumonia. Patient was de-escalated to Unasyn. After 3 negative AFB smears and negative Quantiferon gold, patient was removed from isolation precaution and discharged home on PO Augmentin.     Patient was newly diagnosed with diabetes during admission with " an A1C of 11.4. Patient was started on Insulin for glucose control and required Detemir 18 units daily. Patient was transition to Metformin for discharge wit follow up appointment with PCP.     Consults:   Consults (From admission, onward)        Status Ordering Provider     Inpatient consult to Infectious Diseases  Once     Provider:  Mason Moscoso MD    Completed LISETTE WHEATLEY     Inpatient consult to Pulmonology  Once     Provider:  Joseph Coronel MD    Completed LYNDA FISCHER     Inpatient consult to Registered Dietitian/Nutritionist  Once     Provider:  (Not yet assigned)    Completed LYNDA FISCHER     Inpatient consult to Registered Dietitian/Nutritionist  Once     Provider:  (Not yet assigned)    Completed VIRY BRADFORD          Significant Diagnostic Studies:     Pending Diagnostic Studies:     Procedure Component Value Units Date/Time    Histoplasma Antibody, Serum [[id number]] Collected:  09/08/18 0345    Order Status:  Sent Lab Status:  In process Updated:  09/08/18 0925    Specimen:  Blood     Histoplasma antigen, serum [[id number]] Collected:  09/08/18 0345    Order Status:  Sent Lab Status:  In process Updated:  09/08/18 0925    Specimen:  Blood         Final Active Diagnoses:    Diagnosis Date Noted POA    PRINCIPAL PROBLEM:  Cavitating mass in right upper lung lobe [J98.4] 09/08/2018 Yes    Atypical chest pain [R07.89]  Yes    Cough [R05]  Yes    Lung mass [R91.8] 09/08/2018 Yes    Uncontrolled type 2 diabetes mellitus without complication, without long-term current use of insulin [E11.65] 12/19/2016 Yes      Problems Resolved During this Admission:    Diagnosis Date Noted Date Resolved POA    SOB (shortness of breath) [R06.02]  09/11/2018 Yes      Discharged Condition: stable    Disposition: Home or Self Care    Follow Up:  Follow-up Information     Reid Cavazos MD. Go on 9/26/2018.    Specialty:  Family Medicine  Why:  @ 3:40pm, bring two proofs of  income  Contact information:  [place] [id number]  [phone]             Katina Serrano MD. Go on 10/2/2018.    Specialty:  Pulmonary Disease  Why:  @ 2:20pm, please bring two proofs of income  Contact information:  [address]  [address]  [place] [address]  [phone]                 Patient Instructions:      X-Ray Chest PA And Lateral   Standing Status: Future Standing Exp. Date: 09/11/19   Scheduling Instructions: Please schedule prior to Pulmonary appointment     Order Specific Question Answer Comments   Reason for Exam: To compare to prior x-ray with cavitary lesion    May the Radiologist modify the order per protocol to meet the clinical needs of the patient? Yes      Ambulatory Referral to Pulmonology   Referral Priority: Routine Referral Type: Consultation   Referral Reason: Specialty Services Required   Requested Specialty: Pulmonary Disease   Number of Visits Requested: 1     Notify your health care provider if you experience any of the following:  temperature >100.4     Notify your health care provider if you experience any of the following:  persistent nausea and vomiting or diarrhea     Notify your health care provider if you experience any of the following:  severe uncontrolled pain     Notify your health care provider if you experience any of the following:  difficulty breathing or increased cough     Activity as tolerated     Medications:  Reconciled Home Medications:      Medication List      START taking these medications    amoxicillin-clavulanate 875-125mg 875-125 mg per tablet  Commonly known as:  AUGMENTIN  Take 1 tablet by mouth every 12 (twelve) hours.        CONTINUE taking these medications    ibuprofen 600 MG tablet  Commonly known as:  ADVIL,MOTRIN  Take 1 tablet (600 mg total) by mouth every 6 (six) hours as needed for Pain.     metFORMIN 500 MG tablet  Commonly known as:  GLUCOPHAGE  Take 1 tablet (500 mg total) by mouth daily with breakfast.             Debra Estrada MD  Family Medicine  [facility]

## 2018-09-28 ENCOUNTER — TELEPHONE (OUTPATIENT)
Dept: FAMILY MEDICINE | Facility: HOSPITAL | Age: 43
End: 2018-09-28

## 2018-09-28 NOTE — TELEPHONE ENCOUNTER
----- Message from Maude Peter sent at 9/27/2018 12:54 PM CDT -----  The lab at [facility] needs you to call them regarding this pt @[phone] ext [phone]

## 2018-10-09 LAB — FUNGUS SPEC CULT: NORMAL

## 2018-10-19 ENCOUNTER — OFFICE VISIT (OUTPATIENT)
Dept: FAMILY MEDICINE | Facility: HOSPITAL | Age: 43
End: 2018-10-19
Attending: FAMILY MEDICINE

## 2018-10-19 DIAGNOSIS — J98.4 CAVITATING MASS IN RIGHT UPPER LUNG LOBE: ICD-10-CM

## 2018-10-19 PROCEDURE — 99213 OFFICE O/P EST LOW 20 MIN: CPT | Performed by: STUDENT IN AN ORGANIZED HEALTH CARE EDUCATION/TRAINING PROGRAM

## 2018-10-19 RX ORDER — CODEINE PHOSPHATE AND GUAIFENESIN 10; 100 MG/5ML; MG/5ML
SOLUTION ORAL
Refills: 0 | COMMUNITY
Start: 2018-10-05 | End: 2020-01-10 | Stop reason: CLARIF

## 2018-10-19 NOTE — PROGRESS NOTES
Subjective:       Patient ID: Andrea Fernández is a 43 y.o. male.    Chief Complaint: Diabetes management    42 yo male with history of DMII (A1C: 11.4) presenting for diabetes management and hospital follow up. Patient was in the hospital from 9/7/18-9/11/18 for pleuritic chest pain, nonproductive cough and shortness of breath with concerns for TB as a cavitary lesion was found in the right middle to upper lobe. Patient had 3 Negative AFB sputum smears. Patient was followed by Pulmonology in the hospital that started him on 6 weeks of Augmentin. Patient repeated a chest x-ray at [facility] and will follow up in Chest Clinic at [facility] on 10/24 at 3:15PM.     At the hospital visit, patient had reported non-compliance with Metformin and stated that he would be more compliant after discharge. Today, patient states that he is taking Metformin 500mg daily and consistently. Patient states that he has made changes his diet. He has decreased his alcohol intake significantly from constant drinking from Friday to Sunday to 1 to 2 beers at dinner. Patient is trying to make better decisions in regards to his food choices. However on Taco Tuesday, he does indulge with 20 tacos during meals. Patient checks his glucose after dinner and his glucose ranges from 180-300s depending on his meals. Patient reports decrease urinary frequency from before. Patient has a 20 pounds weight loss. Patient had some diarrhea after restarting metformin but that has improved. Denies dysuria, hematuria, chest pain, cough, fever, chills, abdominal pain, constipation, nausea or vomiting.       Review of Systems   Constitutional: Negative for activity change, chills, fatigue and fever.   HENT: Negative for rhinorrhea and sore throat.    Respiratory: Negative for cough, choking, shortness of breath and wheezing.    Cardiovascular: Negative for chest pain, palpitations and leg swelling.   Gastrointestinal: Negative for abdominal pain, constipation, diarrhea, nausea  and vomiting.   Endocrine: Negative for polydipsia, polyphagia and polyuria.   Genitourinary: Negative for difficulty urinating, frequency, hematuria and urgency.   Musculoskeletal: Negative for back pain and myalgias.   Skin: Negative for color change and rash.   Neurological: Negative for dizziness, weakness, light-headedness and headaches.       Objective:      There were no vitals filed for this visit.  Physical Exam   Constitutional: He is oriented to person, place, and time. He appears well-developed and well-nourished. No distress.   HENT:   Head: Normocephalic and atraumatic.   Mouth/Throat: Oropharynx is clear and moist.   Eyes: EOM are normal. Pupils are equal, round, and reactive to light.   Neck: Normal range of motion. Neck supple. No JVD present. No thyromegaly present.   Cardiovascular: Normal rate, regular rhythm, normal heart sounds and intact distal pulses. Exam reveals no gallop and no friction rub.   No murmur heard.  Pulmonary/Chest: Effort normal and breath sounds normal. No respiratory distress. He has no wheezes.   Abdominal: Soft. Bowel sounds are normal. He exhibits no distension and no mass. There is no tenderness. There is no rebound.   Musculoskeletal: Normal range of motion. He exhibits no edema or tenderness.   Lymphadenopathy:     He has no cervical adenopathy.   Neurological: He is alert and oriented to person, place, and time. No cranial nerve deficit. He exhibits normal muscle tone.   Skin: Skin is warm and dry. Capillary refill takes less than 2 seconds. No rash noted. He is not diaphoretic. No erythema.       Assessment:       1. Uncontrolled type 2 diabetes mellitus without complication, without long-term current use of insulin    2. Cavitating mass in right upper lung lobe        Plan:       Uncontrolled type 2 diabetes mellitus without complication, without long-term current use of insulin  - A1C: 11.4 with reported non-compliance to Metformin  - Continue Metformin 500mg  daily  - Instructed patient to check fasting blood glucose in the AM and record a log for 1 week. Patient will message me the results and if fasting blood glucose is still elevated greater than 130s then will increase Metformin to 1,000mg daily and will send new prescription to pharmacy  - Referral to diabetes education    Cavitating mass in right upper lung lobe  - Patient is continuing 6 weeks of Augmentin therapy  - Patient will follow up at [facility] on 10/24  - From [facility] chart review, repeat chest x-ray from 10/17/18 shows no acute cardiopulmonary process.  - Will continue to monitor and follow up with Chest Clinic notes.     Follow-up in about 2 months (around 12/19/2018). In 2 months, will repeat A1C. Future order has been placed so patient can have lab draw prior to clinic visit to assess improvements in A1C and if medication changes will be required.

## 2018-10-20 NOTE — PROGRESS NOTES
I assume primary medical responsibility for this patient, I have seen the patient, reviewed the case history, findings, diagnosis and treatment plan with the resident and agree that the care is reasonable and necessary.  Shayy Chin  10/20/2018

## 2018-11-10 LAB
ACID FAST MOD KINY STN SPEC: NORMAL
MYCOBACTERIUM SPEC QL CULT: NORMAL

## 2018-11-11 LAB
ACID FAST MOD KINY STN SPEC: NORMAL
MYCOBACTERIUM SPEC QL CULT: NORMAL

## 2018-11-13 LAB
ACID FAST MOD KINY STN SPEC: NORMAL
MYCOBACTERIUM SPEC QL CULT: NORMAL

## 2018-12-26 ENCOUNTER — HOSPITAL ENCOUNTER (EMERGENCY)
Facility: HOSPITAL | Age: 43
Discharge: HOME | End: 2018-12-26
Attending: EMERGENCY MEDICINE

## 2018-12-26 VITALS
BODY MASS INDEX: 33.49 KG/M2 | RESPIRATION RATE: 18 BRPM | OXYGEN SATURATION: 100 % | SYSTOLIC BLOOD PRESSURE: 122 MMHG | HEART RATE: 67 BPM | DIASTOLIC BLOOD PRESSURE: 86 MMHG | HEIGHT: 68 IN | TEMPERATURE: 99 F | WEIGHT: 221 LBS

## 2018-12-26 DIAGNOSIS — E86.0 DEHYDRATION, MILD: Primary | ICD-10-CM

## 2018-12-26 DIAGNOSIS — R42 DIZZINESS: ICD-10-CM

## 2018-12-26 DIAGNOSIS — R07.9 RIGHT-SIDED CHEST PAIN: ICD-10-CM

## 2018-12-26 LAB
ALBUMIN SERPL BCP-MCNC: 4.3 G/DL
ALP SERPL-CCNC: 77 U/L
ALT SERPL W/O P-5'-P-CCNC: 58 U/L
ANION GAP SERPL CALC-SCNC: 10 MMOL/L
ANISOCYTOSIS BLD QL SMEAR: SLIGHT
AST SERPL-CCNC: 25 U/L
BASOPHILS # BLD AUTO: 0.04 K/UL
BASOPHILS NFR BLD: 0.5 %
BILIRUB SERPL-MCNC: 0.6 MG/DL
BILIRUB UR QL STRIP: NEGATIVE
BNP SERPL-MCNC: <10 PG/ML
BUN SERPL-MCNC: 20 MG/DL
CALCIUM SERPL-MCNC: 9.9 MG/DL
CHLORIDE SERPL-SCNC: 99 MMOL/L
CK SERPL-CCNC: 163 U/L
CLARITY UR: CLEAR
CO2 SERPL-SCNC: 25 MMOL/L
COLOR UR: YELLOW
CREAT SERPL-MCNC: 0.8 MG/DL
DACRYOCYTES BLD QL SMEAR: ABNORMAL
DIFFERENTIAL METHOD: ABNORMAL
EOSINOPHIL # BLD AUTO: 0.1 K/UL
EOSINOPHIL NFR BLD: 1.5 %
ERYTHROCYTE [DISTWIDTH] IN BLOOD BY AUTOMATED COUNT: 12 %
EST. GFR  (AFRICAN AMERICAN): >60 ML/MIN/1.73 M^2
EST. GFR  (NON AFRICAN AMERICAN): >60 ML/MIN/1.73 M^2
GLUCOSE SERPL-MCNC: 262 MG/DL
GLUCOSE UR QL STRIP: ABNORMAL
HCT VFR BLD AUTO: 45 %
HGB BLD-MCNC: 16.5 G/DL
HGB UR QL STRIP: NEGATIVE
HYPOCHROMIA BLD QL SMEAR: ABNORMAL
KETONES UR QL STRIP: NEGATIVE
LEUKOCYTE ESTERASE UR QL STRIP: NEGATIVE
LYMPHOCYTES # BLD AUTO: 3.1 K/UL
LYMPHOCYTES NFR BLD: 39.7 %
MCH RBC QN AUTO: 31.1 PG
MCHC RBC AUTO-ENTMCNC: 36.7 G/DL
MCV RBC AUTO: 85 FL
MONOCYTES # BLD AUTO: 0.4 K/UL
MONOCYTES NFR BLD: 5.1 %
NEUTROPHILS # BLD AUTO: 4.1 K/UL
NEUTROPHILS NFR BLD: 53.2 %
NITRITE UR QL STRIP: NEGATIVE
OVALOCYTES BLD QL SMEAR: ABNORMAL
PH UR STRIP: 6 [PH] (ref 5–8)
PLATELET # BLD AUTO: 268 K/UL
PLATELET BLD QL SMEAR: ABNORMAL
PMV BLD AUTO: 9.3 FL
POCT GLUCOSE: 212 MG/DL (ref 70–110)
POCT GLUCOSE: 228 MG/DL (ref 70–110)
POIKILOCYTOSIS BLD QL SMEAR: SLIGHT
POTASSIUM SERPL-SCNC: 3.3 MMOL/L
PROT SERPL-MCNC: 7.6 G/DL
PROT UR QL STRIP: NEGATIVE
RBC # BLD AUTO: 5.3 M/UL
SODIUM SERPL-SCNC: 134 MMOL/L
SP GR UR STRIP: 1.01 (ref 1–1.03)
TROPONIN I SERPL DL<=0.01 NG/ML-MCNC: <0.006 NG/ML
TROPONIN I SERPL DL<=0.01 NG/ML-MCNC: <0.006 NG/ML
URN SPEC COLLECT METH UR: ABNORMAL
UROBILINOGEN UR STRIP-ACNC: NEGATIVE EU/DL
WBC # BLD AUTO: 7.8 K/UL

## 2018-12-26 PROCEDURE — 80053 COMPREHEN METABOLIC PANEL: CPT

## 2018-12-26 PROCEDURE — 87040 BLOOD CULTURE FOR BACTERIA: CPT

## 2018-12-26 PROCEDURE — 96374 THER/PROPH/DIAG INJ IV PUSH: CPT

## 2018-12-26 PROCEDURE — 99284 EMERGENCY DEPT VISIT MOD MDM: CPT | Mod: 25

## 2018-12-26 PROCEDURE — 96361 HYDRATE IV INFUSION ADD-ON: CPT

## 2018-12-26 PROCEDURE — 82962 GLUCOSE BLOOD TEST: CPT

## 2018-12-26 PROCEDURE — 85025 COMPLETE CBC W/AUTO DIFF WBC: CPT

## 2018-12-26 PROCEDURE — 81003 URINALYSIS AUTO W/O SCOPE: CPT

## 2018-12-26 PROCEDURE — 84484 ASSAY OF TROPONIN QUANT: CPT

## 2018-12-26 PROCEDURE — 93005 ELECTROCARDIOGRAM TRACING: CPT

## 2018-12-26 PROCEDURE — 63600175 PHARM REV CODE 636 W HCPCS: Performed by: PHYSICIAN ASSISTANT

## 2018-12-26 PROCEDURE — 25000003 PHARM REV CODE 250: Performed by: PHYSICIAN ASSISTANT

## 2018-12-26 PROCEDURE — 93010 ELECTROCARDIOGRAM REPORT: CPT | Mod: ,,, | Performed by: STUDENT IN AN ORGANIZED HEALTH CARE EDUCATION/TRAINING PROGRAM

## 2018-12-26 PROCEDURE — 83880 ASSAY OF NATRIURETIC PEPTIDE: CPT

## 2018-12-26 PROCEDURE — 82550 ASSAY OF CK (CPK): CPT

## 2018-12-26 RX ORDER — ONDANSETRON 2 MG/ML
4 INJECTION INTRAMUSCULAR; INTRAVENOUS
Status: COMPLETED | OUTPATIENT
Start: 2018-12-26 | End: 2018-12-26

## 2018-12-26 RX ADMIN — SODIUM CHLORIDE 1000 ML: 0.9 INJECTION, SOLUTION INTRAVENOUS at 08:12

## 2018-12-26 RX ADMIN — SODIUM CHLORIDE 1000 ML: 0.9 INJECTION, SOLUTION INTRAVENOUS at 06:12

## 2018-12-26 RX ADMIN — ONDANSETRON 4 MG: 2 INJECTION INTRAMUSCULAR; INTRAVENOUS at 07:12
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
## 2019-01-01 LAB
BACTERIA BLD CULT: NORMAL
BACTERIA BLD CULT: NORMAL

## 2019-09-11 ENCOUNTER — OFFICE VISIT (OUTPATIENT)
Dept: FAMILY MEDICINE | Facility: HOSPITAL | Age: 44
End: 2019-09-11
Attending: FAMILY MEDICINE

## 2019-09-11 VITALS
HEIGHT: 68 IN | BODY MASS INDEX: 31.34 KG/M2 | DIASTOLIC BLOOD PRESSURE: 68 MMHG | WEIGHT: 206.81 LBS | SYSTOLIC BLOOD PRESSURE: 118 MMHG | HEART RATE: 68 BPM

## 2019-09-11 DIAGNOSIS — M25.522 LEFT ELBOW PAIN: ICD-10-CM

## 2019-09-11 DIAGNOSIS — M25.561 ACUTE PAIN OF RIGHT KNEE: Primary | ICD-10-CM

## 2019-09-11 PROCEDURE — 99213 OFFICE O/P EST LOW 20 MIN: CPT | Performed by: STUDENT IN AN ORGANIZED HEALTH CARE EDUCATION/TRAINING PROGRAM

## 2019-09-11 NOTE — PROGRESS NOTES
"Subjective:       Patient ID: Andrea Fernández is a 44 y.o. male.    Chief Complaint: muscle strain    Mr. Fernández is a 45 yo M with a PMH of DM2 who presents to clinic for urgent evaluation of R knee pain with effusion and sacral tuberosity pain. The duration of pain is 1 week. The pain began after he fell on his R knee after missing a step on a ladder. He rates the pain as an 8/10. He admits feeling a "pop" sensation after injuring his knee. The knee pain is worsened by rising from a seated position. Motrin, tylenol, ice, and elevation decrease the swelling but have not relieved the pain. He denies fever, knee erythema, and inability to bear weight on his R leg. He has not been evaluated for this injury elsewhere. He has no other complaints.      Review of Systems   Constitutional: Positive for activity change. Negative for chills and fever.   HENT: Negative.    Eyes: Negative for visual disturbance.   Respiratory: Negative for chest tightness and shortness of breath.    Cardiovascular: Negative for chest pain and palpitations.   Gastrointestinal: Negative for abdominal distention.   Endocrine: Negative.    Genitourinary: Negative for difficulty urinating and dysuria.   Musculoskeletal: Positive for arthralgias, gait problem and joint swelling.   Skin: Negative for color change and wound.   Allergic/Immunologic: Negative.    Neurological: Positive for numbness (RLE). Negative for weakness.   Hematological: Negative.    Psychiatric/Behavioral: Negative.        Objective:      Vitals:    09/11/19 1510   BP: 118/68   Pulse: 68     Physical Exam   Constitutional: He appears well-developed and well-nourished. No distress.   HENT:   Head: Normocephalic and atraumatic.   Eyes: EOM are normal.   Neck: Normal range of motion. Neck supple. No thyromegaly present.   Cardiovascular: Normal rate and regular rhythm. Exam reveals no gallop and no friction rub.   No murmur heard.  Pulmonary/Chest: Effort normal and breath sounds " normal. No respiratory distress.   Abdominal: Soft. Bowel sounds are normal. He exhibits no distension.   Musculoskeletal: Normal range of motion. He exhibits edema and tenderness.        Right knee: He exhibits swelling, effusion and bony tenderness. He exhibits normal range of motion, no ecchymosis, no deformity, no laceration, no erythema, no LCL laxity, normal meniscus and no MCL laxity.   Skin: Skin is warm and dry. Capillary refill takes less than 2 seconds.   Psychiatric: He has a normal mood and affect. His behavior is normal.       Assessment:       1. Acute pain of right knee    2. Left elbow pain        Plan:       Acute pain of right knee  -     X-ray Knee Ortho Right; Future; Expected date: 09/22/2019    Left elbow pain  -     X-Ray Elbow 2 Views Left; Future; Expected date: 09/22/2019    Other orders  -     methylPREDNISolone (MEDROL DOSEPACK) 4 mg tablet; use as directed  Dispense: 1 Package; Refill: 0  -     cyclobenzaprine (FLEXERIL) 5 MG tablet; Take 1 tablet (5 mg total) by mouth 3 (three) times daily as needed for Muscle spasms.  Dispense: 30 tablet; Refill: 0  -     lidocaine (LIDODERM) 5 %; Place 1 patch onto the skin daily as needed. Remove & Discard patch within 12 hours or as directed by MD  Dispense: 15 patch; Refill: 0      Follow up in about 4 weeks (around 10/9/2019).

## 2019-09-11 NOTE — PROGRESS NOTES
"Subjective:       Patient ID: Andrea Fernández is a 44 y.o. male.    Chief Complaint: muscle strain    Mr. Fernández is a 45 yo M with a PMH of DM2 who presents to clinic for urgent evaluation of R knee pain with effusion and sacral tuberosity pain. The duration of pain is 1 week. The pain began after he fell on his R knee after missing a step on a ladder. He rates the pain as an 8/10. He admits feeling a "pop" sensation after injuring his knee. The knee pain is worsened by rising from a seated position. Motrin, tylenol, ice, and elevation decrease the swelling but have not relieved the pain. He denies fever, knee erythema, and inability to bear weight on his R leg. He has not been evaluated for this injury elsewhere. He has no other complaints.      Review of Systems   Constitutional: Positive for activity change. Negative for chills and fever.   HENT: Negative.    Eyes: Negative for visual disturbance.   Respiratory: Negative for chest tightness and shortness of breath.    Cardiovascular: Negative for chest pain and palpitations.   Gastrointestinal: Negative for abdominal distention.   Endocrine: Negative.    Genitourinary: Negative for difficulty urinating and dysuria.   Musculoskeletal: Positive for arthralgias, gait problem and joint swelling.   Skin: Negative for color change and wound.   Allergic/Immunologic: Negative.    Neurological: Positive for numbness (RLE). Negative for weakness.   Hematological: Negative.    Psychiatric/Behavioral: Negative.        Objective:      Vitals:    09/11/19 1510   BP: 118/68   Pulse: 68     Physical Exam   Constitutional: He appears well-developed and well-nourished. No distress.   HENT:   Head: Normocephalic and atraumatic.   Eyes: EOM are normal.   Neck: Normal range of motion. Neck supple. No thyromegaly present.   Cardiovascular: Normal rate and regular rhythm. Exam reveals no gallop and no friction rub.   No murmur heard.  Pulmonary/Chest: Effort normal and breath sounds " normal. No respiratory distress.   Abdominal: Soft. Bowel sounds are normal. He exhibits no distension.   Musculoskeletal: Normal range of motion. He exhibits edema and tenderness.        Right knee: He exhibits swelling, effusion and bony tenderness. He exhibits normal range of motion, no ecchymosis, no deformity, no laceration, no erythema, no LCL laxity, normal meniscus and no MCL laxity.   Skin: Skin is warm and dry. Capillary refill takes less than 2 seconds.   Psychiatric: He has a normal mood and affect. His behavior is normal.       Assessment:       No diagnosis found.    Plan:       There are no diagnoses linked to this encounter.  No follow-ups on file.

## 2019-09-12 RX ORDER — METHYLPREDNISOLONE 4 MG/1
TABLET ORAL
Qty: 1 PACKAGE | Refills: 0 | Status: SHIPPED | OUTPATIENT
Start: 2019-09-12 | End: 2019-10-03

## 2019-09-12 RX ORDER — CYCLOBENZAPRINE HCL 5 MG
5 TABLET ORAL 3 TIMES DAILY PRN
Qty: 30 TABLET | Refills: 0 | Status: SHIPPED | OUTPATIENT
Start: 2019-09-12 | End: 2019-09-22

## 2019-09-12 RX ORDER — LIDOCAINE 50 MG/G
1 PATCH TOPICAL DAILY PRN
Qty: 15 PATCH | Refills: 0 | Status: SHIPPED | OUTPATIENT
Start: 2019-09-12 | End: 2019-10-12

## 2019-09-13 ENCOUNTER — TELEPHONE (OUTPATIENT)
Dept: FAMILY MEDICINE | Facility: HOSPITAL | Age: 44
End: 2019-09-13

## 2019-09-13 NOTE — TELEPHONE ENCOUNTER
----- Message from Adele Vigil sent at 9/12/2019  2:35 PM CDT -----  PT would like his medicine to be sent to the [facility] because [facility] is too expensive

## 2019-09-16 DIAGNOSIS — E11.9 CONTROLLED TYPE 2 DIABETES MELLITUS WITHOUT COMPLICATION, WITHOUT LONG-TERM CURRENT USE OF INSULIN: ICD-10-CM

## 2019-09-17 RX ORDER — METFORMIN HYDROCHLORIDE 500 MG/1
500 TABLET ORAL
Qty: 90 TABLET | Refills: 3 | OUTPATIENT
Start: 2019-09-17 | End: 2020-09-16

## 2019-09-23 ENCOUNTER — TELEPHONE (OUTPATIENT)
Dept: FAMILY MEDICINE | Facility: HOSPITAL | Age: 44
End: 2019-09-23

## 2019-09-26 DIAGNOSIS — E11.9 CONTROLLED TYPE 2 DIABETES MELLITUS WITHOUT COMPLICATION, WITHOUT LONG-TERM CURRENT USE OF INSULIN: ICD-10-CM

## 2019-09-26 NOTE — PROGRESS NOTES
Attempted to call patient regarding medications being sent to [facility]. Dr. Mitchell prescribed Medrol Dosepack and unclear if patient wanted Metformin or Medrol Dosepack sent to [facility]. Contact number for patient was the wrong number. Attempted to call wife who is the emergency contact but she did answer the phone and mailbox was too full to leave a message. Will wait for patient to call back before sending prescriptions. Please have patient update contact information    Debra Estrada, PGY-2  [facility]  09/26/2019 11:49 AM

## 2020-01-09 DIAGNOSIS — E11.9 CONTROLLED TYPE 2 DIABETES MELLITUS WITHOUT COMPLICATION, WITHOUT LONG-TERM CURRENT USE OF INSULIN: ICD-10-CM

## 2020-01-09 NOTE — TELEPHONE ENCOUNTER
----- Message from Tova Mckeon sent at 1/9/2020 10:18 AM CST -----  Contact: Self  Hello,     Patient would like a refill for metFORMIN (GLUCOPHAGE) 500 MG tablet. Please send refills to [facility] in [place].   Patient may be contact at: [phone]    Thanks.

## 2020-01-10 ENCOUNTER — HOSPITAL ENCOUNTER (EMERGENCY)
Facility: HOSPITAL | Age: 45
Discharge: HOME | End: 2020-01-10
Attending: EMERGENCY MEDICINE
Payer: MEDICAID

## 2020-01-10 VITALS
SYSTOLIC BLOOD PRESSURE: 134 MMHG | WEIGHT: 204 LBS | RESPIRATION RATE: 18 BRPM | TEMPERATURE: 98 F | HEIGHT: 69 IN | OXYGEN SATURATION: 97 % | HEART RATE: 61 BPM | BODY MASS INDEX: 30.21 KG/M2 | DIASTOLIC BLOOD PRESSURE: 88 MMHG

## 2020-01-10 DIAGNOSIS — M25.561 ACUTE PAIN OF RIGHT KNEE: Primary | ICD-10-CM

## 2020-01-10 DIAGNOSIS — M25.569 KNEE PAIN: ICD-10-CM

## 2020-01-10 DIAGNOSIS — M54.9 BACK PAIN: ICD-10-CM

## 2020-01-10 DIAGNOSIS — E11.9 CONTROLLED TYPE 2 DIABETES MELLITUS WITHOUT COMPLICATION, WITHOUT LONG-TERM CURRENT USE OF INSULIN: ICD-10-CM

## 2020-01-10 DIAGNOSIS — T14.8XXA MUSCLE STRAIN: ICD-10-CM

## 2020-01-10 LAB
POCT GLUCOSE: 251 MG/DL (ref 70–110)
POCT GLUCOSE: 304 MG/DL (ref 70–110)

## 2020-01-10 PROCEDURE — 99284 EMERGENCY DEPT VISIT MOD MDM: CPT | Mod: 25

## 2020-01-10 PROCEDURE — 96360 HYDRATION IV INFUSION INIT: CPT

## 2020-01-10 PROCEDURE — 25000003 PHARM REV CODE 250: Performed by: EMERGENCY MEDICINE

## 2020-01-10 PROCEDURE — 36000 PLACE NEEDLE IN VEIN: CPT

## 2020-01-10 PROCEDURE — 82962 GLUCOSE BLOOD TEST: CPT

## 2020-01-10 PROCEDURE — 63600175 PHARM REV CODE 636 W HCPCS: Performed by: EMERGENCY MEDICINE

## 2020-01-10 RX ORDER — SODIUM CHLORIDE 9 MG/ML
1000 INJECTION, SOLUTION INTRAVENOUS
Status: COMPLETED | OUTPATIENT
Start: 2020-01-10 | End: 2020-01-10

## 2020-01-10 RX ORDER — IBUPROFEN 600 MG/1
600 TABLET ORAL EVERY 6 HOURS PRN
Qty: 20 TABLET | Refills: 0 | Status: SHIPPED | OUTPATIENT
Start: 2020-01-10 | End: 2020-01-29

## 2020-01-10 RX ORDER — ORPHENADRINE CITRATE 100 MG/1
100 TABLET, EXTENDED RELEASE ORAL 2 TIMES DAILY PRN
Qty: 20 TABLET | Refills: 0 | Status: SHIPPED | OUTPATIENT
Start: 2020-01-10 | End: 2020-01-29

## 2020-01-10 RX ORDER — IBUPROFEN 600 MG/1
600 TABLET ORAL
Status: COMPLETED | OUTPATIENT
Start: 2020-01-10 | End: 2020-01-10

## 2020-01-10 RX ADMIN — SODIUM CHLORIDE 1000 ML: 0.9 INJECTION, SOLUTION INTRAVENOUS at 09:01

## 2020-01-10 RX ADMIN — SODIUM CHLORIDE 1000 ML: 0.9 INJECTION, SOLUTION INTRAVENOUS at 08:01

## 2020-01-10 RX ADMIN — IBUPROFEN 600 MG: 600 TABLET, FILM COATED ORAL at 08:01

## 2020-01-11 LAB — POCT GLUCOSE: 205 MG/DL (ref 70–110)
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
## 2020-01-11 NOTE — ED TRIAGE NOTES
Pt presents to ED and reports he had a slip and fall today at 1640 at a resturant.. He states he was walking back to the table and there was water on the floor and he states he slipped and hit his R knee on the table and fell to the floor on his buttock. Pt also C/O lower back pain. Pt states pain and states the pain is 10/10 at this time. Pt denies any Loss of bowl or bladder. Pt also denies hitting his head or any  LOC. Pt denies taking any medication for the pain.

## 2020-01-12 RX ORDER — METFORMIN HYDROCHLORIDE 500 MG/1
500 TABLET ORAL
Qty: 90 TABLET | Refills: 3 | Status: SHIPPED | OUTPATIENT
Start: 2020-01-12 | End: 2020-01-30

## 2020-01-12 RX ORDER — METFORMIN HYDROCHLORIDE 500 MG/1
500 TABLET ORAL
Qty: 90 TABLET | Refills: 0 | OUTPATIENT
Start: 2020-01-12 | End: 2021-01-11

## 2020-01-29 ENCOUNTER — OFFICE VISIT (OUTPATIENT)
Dept: FAMILY MEDICINE | Facility: HOSPITAL | Age: 45
End: 2020-01-29
Payer: MEDICAID

## 2020-01-29 VITALS
HEIGHT: 69 IN | DIASTOLIC BLOOD PRESSURE: 76 MMHG | SYSTOLIC BLOOD PRESSURE: 113 MMHG | BODY MASS INDEX: 31.15 KG/M2 | WEIGHT: 210.31 LBS | HEART RATE: 76 BPM

## 2020-01-29 DIAGNOSIS — M72.2 PLANTAR FASCIITIS: ICD-10-CM

## 2020-01-29 PROCEDURE — 99213 OFFICE O/P EST LOW 20 MIN: CPT | Performed by: STUDENT IN AN ORGANIZED HEALTH CARE EDUCATION/TRAINING PROGRAM

## 2020-01-30 RX ORDER — METFORMIN HYDROCHLORIDE EXTENDED-RELEASE TABLETS 500 MG/1
1000 TABLET, FILM COATED, EXTENDED RELEASE ORAL
Qty: 180 TABLET | Refills: 3 | Status: SHIPPED | OUTPATIENT
Start: 2020-01-30 | End: 2020-01-31

## 2020-01-30 NOTE — PROGRESS NOTES
Subjective:       Patient ID: Andrea Fernández is a 44 y.o. male.    Chief Complaint: Diabetes management    43 yo male with history of DMII that presents to clinic for follow up. Patient reports improved compliance with his Metformin 500mg daily this past month. Patient has not had an A1C check for greater than 1 year. Last A1C check was in 2018 and was 11.4 Patient does not check his AM fasting glucose even though he has supplies. Patient has not changed his diet. He continues to drink 2 cases of beer on the weekend. Patient continues to work in construction.     Patient is also reports pain in the left upper back. Patient denies any injuries, straining or lifting. Patient has not tried any medications for the pain. Patient also reports pain on the heel that worsens on his first step.     Review of Systems   Constitutional: Negative for activity change, chills, fatigue and fever.   HENT: Negative for rhinorrhea and sore throat.    Respiratory: Negative for cough, choking, shortness of breath and wheezing.    Cardiovascular: Negative for chest pain, palpitations and leg swelling.   Gastrointestinal: Negative for abdominal pain, constipation, diarrhea, nausea and vomiting.   Endocrine: Negative for polydipsia, polyphagia and polyuria.   Genitourinary: Negative for difficulty urinating, frequency, hematuria and urgency.   Musculoskeletal: Negative for back pain and myalgias.   Skin: Negative for color change and rash.   Neurological: Negative for dizziness, weakness, light-headedness and headaches.       Objective:      Vitals:    01/29/20 1622   BP: 113/76   Pulse: 76     Physical Exam   Constitutional: He is oriented to person, place, and time. He appears well-developed and well-nourished. No distress.   HENT:   Head: Normocephalic and atraumatic.   Mouth/Throat: Oropharynx is clear and moist.   Eyes: Pupils are equal, round, and reactive to light. EOM are normal.   Neck: Normal range of motion. Neck supple. No JVD  present. No thyromegaly present.   Cardiovascular: Normal rate, regular rhythm, normal heart sounds and intact distal pulses. Exam reveals no gallop and no friction rub.   No murmur heard.  Pulmonary/Chest: Effort normal and breath sounds normal. No respiratory distress. He has no wheezes.   Abdominal: Soft. Bowel sounds are normal. He exhibits no distension and no mass. There is no tenderness. There is no rebound.   Musculoskeletal: Normal range of motion. He exhibits no edema or tenderness.   Paraspinal tenderness on palpation   Lymphadenopathy:     He has no cervical adenopathy.   Neurological: He is alert and oriented to person, place, and time. No cranial nerve deficit. He exhibits normal muscle tone.   Skin: Skin is warm and dry. Capillary refill takes less than 2 seconds. No rash noted. He is not diaphoretic. No erythema.       Assessment:       1. Uncontrolled type 2 diabetes mellitus without complication, without long-term current use of insulin    2. Plantar fasciitis        Plan:       Uncontrolled type 2 diabetes mellitus without complication, without long-term current use of insulin  -     metFORMIN (FORTAMET) 500 mg 24 hr tablet; Take 2 tablets (1,000 mg total) by mouth daily with breakfast.  Dispense: 180 tablet; Refill: 3  -     Discussed with patient to double his medications until he runs out and sent new prescription [facility] as it is under the $10 plan for 360 pills  -     As patient is self pay, sent patient to [facility] for A1C. Will call patient once results return and have further discussion for next A1C check    Plantar fasciitis        - Discussed exercises for patient. If no improvement, can send to PT      Follow up in about 6 months (around 7/29/2020). Discussed NSAIDs for paraspinal tenderness.

## 2020-01-31 ENCOUNTER — TELEPHONE (OUTPATIENT)
Dept: FAMILY MEDICINE | Facility: HOSPITAL | Age: 45
End: 2020-01-31

## 2020-01-31 RX ORDER — METFORMIN HYDROCHLORIDE 500 MG/1
1000 TABLET, EXTENDED RELEASE ORAL
Qty: 180 TABLET | Refills: 3 | Status: ON HOLD | OUTPATIENT
Start: 2020-01-31 | End: 2020-03-03 | Stop reason: HOSPADM

## 2020-01-31 RX ORDER — METFORMIN HYDROCHLORIDE 500 MG/1
1000 TABLET, EXTENDED RELEASE ORAL
Qty: 60 TABLET | Refills: 11 | Status: CANCELLED | OUTPATIENT
Start: 2020-01-31 | End: 2021-01-30

## 2020-01-31 NOTE — TELEPHONE ENCOUNTER
----- Message from Hemalatha Juarez MA sent at 1/31/2020 12:18 PM CST -----  Contact: [facility] [phone]  Rx sent for fortamet costs over $1500.00.  Pharmacy wants to change to metformin xr 500.  Please advise.  Thanks.

## 2020-02-01 NOTE — PROGRESS NOTES
I have reviewed the notes, assessments, and/or procedures performed, I concur with her/his documentation of Andrea Fernández.

## 2020-03-02 ENCOUNTER — HOSPITAL ENCOUNTER (OUTPATIENT)
Facility: HOSPITAL | Age: 45
Discharge: HOME | End: 2020-03-03
Attending: EMERGENCY MEDICINE | Admitting: FAMILY MEDICINE
Payer: MEDICAID

## 2020-03-02 DIAGNOSIS — R20.0 LEFT ARM NUMBNESS: ICD-10-CM

## 2020-03-02 DIAGNOSIS — R27.8 DYSMETRIA: ICD-10-CM

## 2020-03-02 DIAGNOSIS — R41.0 CONFUSION: ICD-10-CM

## 2020-03-02 DIAGNOSIS — R42 DIZZINESS: ICD-10-CM

## 2020-03-02 DIAGNOSIS — G45.9 TIA (TRANSIENT ISCHEMIC ATTACK): Primary | ICD-10-CM

## 2020-03-02 DIAGNOSIS — R07.9 CHEST PAIN: ICD-10-CM

## 2020-03-02 PROBLEM — R91.8 LUNG MASS: Status: RESOLVED | Noted: 2018-09-08 | Resolved: 2020-03-02

## 2020-03-02 PROBLEM — J98.4 CAVITATING MASS IN RIGHT UPPER LUNG LOBE: Status: RESOLVED | Noted: 2018-09-08 | Resolved: 2020-03-02

## 2020-03-02 LAB
ALBUMIN SERPL BCP-MCNC: 4.4 G/DL (ref 3.5–5.2)
ALLENS TEST: NORMAL
ALLENS TEST: NORMAL
ALP SERPL-CCNC: 71 U/L (ref 55–135)
ALT SERPL W/O P-5'-P-CCNC: 49 U/L (ref 10–44)
AMPHET+METHAMPHET UR QL: NEGATIVE
ANION GAP SERPL CALC-SCNC: 11 MMOL/L (ref 8–16)
AORTIC ROOT ANNULUS: 3.18 CM
ASCENDING AORTA: 2.86 CM
AST SERPL-CCNC: 24 U/L (ref 10–40)
AV INDEX (PROSTH): 0.79
AV MEAN GRADIENT: 5 MMHG
AV PEAK GRADIENT: 10 MMHG
AV VALVE AREA: 2.88 CM2
AV VELOCITY RATIO: 0.77
BACTERIA #/AREA URNS HPF: NORMAL /HPF
BARBITURATES UR QL SCN>200 NG/ML: NEGATIVE
BASOPHILS # BLD AUTO: 0.06 K/UL (ref 0–0.2)
BASOPHILS NFR BLD: 0.8 % (ref 0–1.9)
BENZODIAZ UR QL SCN>200 NG/ML: NEGATIVE
BILIRUB SERPL-MCNC: 0.8 MG/DL (ref 0.1–1)
BILIRUB UR QL STRIP: NEGATIVE
BSA FOR ECHO PROCEDURE: 2.1 M2
BUN SERPL-MCNC: 9 MG/DL (ref 6–20)
BZE UR QL SCN: NEGATIVE
CALCIUM SERPL-MCNC: 9.5 MG/DL (ref 8.7–10.5)
CANNABINOIDS UR QL SCN: NEGATIVE
CHLORIDE SERPL-SCNC: 102 MMOL/L (ref 95–110)
CHOLEST SERPL-MCNC: 157 MG/DL (ref 120–199)
CHOLEST/HDLC SERPL: 5.1 {RATIO} (ref 2–5)
CK SERPL-CCNC: 169 U/L (ref 20–200)
CLARITY UR: CLEAR
CO2 SERPL-SCNC: 22 MMOL/L (ref 23–29)
COLOR UR: YELLOW
CREAT SERPL-MCNC: 0.5 MG/DL (ref 0.5–1.4)
CREAT SERPL-MCNC: 0.8 MG/DL (ref 0.5–1.4)
CREAT UR-MCNC: 21.6 MG/DL (ref 23–375)
CV ECHO LV RWT: 0.41 CM
DIFFERENTIAL METHOD: ABNORMAL
DOP CALC AO PEAK VEL: 1.55 M/S
DOP CALC AO VTI: 33.36 CM
DOP CALC LVOT AREA: 3.7 CM2
DOP CALC LVOT DIAMETER: 2.16 CM
DOP CALC LVOT PEAK VEL: 1.19 M/S
DOP CALC LVOT STROKE VOLUME: 96.18 CM3
DOP CALCLVOT PEAK VEL VTI: 26.26 CM
E WAVE DECELERATION TIME: 239.13 MSEC
E/A RATIO: 1.35
E/E' RATIO: 8.22 M/S
ECHO LV POSTERIOR WALL: 1.01 CM (ref 0.6–1.1)
EOSINOPHIL # BLD AUTO: 0.2 K/UL (ref 0–0.5)
EOSINOPHIL NFR BLD: 2.2 % (ref 0–8)
ERYTHROCYTE [DISTWIDTH] IN BLOOD BY AUTOMATED COUNT: 11 % (ref 11.5–14.5)
EST. GFR  (AFRICAN AMERICAN): >60 ML/MIN/1.73 M^2
EST. GFR  (NON AFRICAN AMERICAN): >60 ML/MIN/1.73 M^2
ESTIMATED AVG GLUCOSE: 249 MG/DL (ref 68–131)
FRACTIONAL SHORTENING: 36 % (ref 28–44)
GLUCOSE SERPL-MCNC: 267 MG/DL (ref 70–110)
GLUCOSE UR QL STRIP: ABNORMAL
HBA1C MFR BLD HPLC: 10.3 % (ref 4–5.6)
HCT VFR BLD AUTO: 47 % (ref 40–54)
HDLC SERPL-MCNC: 31 MG/DL (ref 40–75)
HDLC SERPL: 19.7 % (ref 20–50)
HGB BLD-MCNC: 17.4 G/DL (ref 14–18)
HGB UR QL STRIP: NEGATIVE
IMM GRANULOCYTES # BLD AUTO: 0.01 K/UL (ref 0–0.04)
IMM GRANULOCYTES NFR BLD AUTO: 0.1 % (ref 0–0.5)
INR PPP: 1.1 (ref 0.8–1.2)
INTERVENTRICULAR SEPTUM: 1.01 CM (ref 0.6–1.1)
KETONES UR QL STRIP: NEGATIVE
LA MAJOR: 4.1 CM
LA MINOR: 3.4 CM
LA WIDTH: 2.93 CM
LDLC SERPL CALC-MCNC: 94.8 MG/DL (ref 63–159)
LEFT ATRIUM SIZE: 3.67 CM
LEFT ATRIUM VOLUME INDEX: 16.6 ML/M2
LEFT ATRIUM VOLUME: 33.98 CM3
LEFT INTERNAL DIMENSION IN SYSTOLE: 3.12 CM (ref 2.1–4)
LEFT VENTRICLE DIASTOLIC VOLUME INDEX: 54.85 ML/M2
LEFT VENTRICLE DIASTOLIC VOLUME: 112.59 ML
LEFT VENTRICLE MASS INDEX: 87 G/M2
LEFT VENTRICLE SYSTOLIC VOLUME INDEX: 18.8 ML/M2
LEFT VENTRICLE SYSTOLIC VOLUME: 38.52 ML
LEFT VENTRICULAR INTERNAL DIMENSION IN DIASTOLE: 4.9 CM (ref 3.5–6)
LEFT VENTRICULAR MASS: 178.42 G
LEUKOCYTE ESTERASE UR QL STRIP: NEGATIVE
LV LATERAL E/E' RATIO: 7.4 M/S
LV SEPTAL E/E' RATIO: 9.25 M/S
LYMPHOCYTES # BLD AUTO: 2.9 K/UL (ref 1–4.8)
LYMPHOCYTES NFR BLD: 39.4 % (ref 18–48)
MCH RBC QN AUTO: 31.6 PG (ref 27–31)
MCHC RBC AUTO-ENTMCNC: 37 G/DL (ref 32–36)
MCV RBC AUTO: 86 FL (ref 82–98)
METHADONE UR QL SCN>300 NG/ML: NEGATIVE
MICROSCOPIC COMMENT: NORMAL
MONOCYTES # BLD AUTO: 0.5 K/UL (ref 0.3–1)
MONOCYTES NFR BLD: 7.1 % (ref 4–15)
MV PEAK A VEL: 0.55 M/S
MV PEAK E VEL: 0.74 M/S
NEUTROPHILS # BLD AUTO: 3.7 K/UL (ref 1.8–7.7)
NEUTROPHILS NFR BLD: 50.4 % (ref 38–73)
NITRITE UR QL STRIP: NEGATIVE
NONHDLC SERPL-MCNC: 126 MG/DL
NRBC BLD-RTO: 0 /100 WBC
OPIATES UR QL SCN: NEGATIVE
PCP UR QL SCN>25 NG/ML: NEGATIVE
PH UR STRIP: 7 [PH] (ref 5–8)
PISA TR MAX VEL: 1.73 M/S
PLATELET # BLD AUTO: 263 K/UL (ref 150–350)
PMV BLD AUTO: 9.5 FL (ref 9.2–12.9)
POC PTINR: 1.2 (ref 0.9–1.2)
POC PTWBT: 14 SEC (ref 9.7–14.3)
POCT GLUCOSE: 209 MG/DL (ref 70–110)
POCT GLUCOSE: 221 MG/DL (ref 70–110)
POCT GLUCOSE: 255 MG/DL (ref 70–110)
POCT GLUCOSE: 256 MG/DL (ref 70–110)
POCT GLUCOSE: 288 MG/DL (ref 70–110)
POTASSIUM SERPL-SCNC: 3.8 MMOL/L (ref 3.5–5.1)
PROT SERPL-MCNC: 7.4 G/DL (ref 6–8.4)
PROT UR QL STRIP: NEGATIVE
PROTHROMBIN TIME: 11.4 SEC (ref 9–12.5)
PULM VEIN S/D RATIO: 2.07
PV PEAK D VEL: 0.28 M/S
PV PEAK S VEL: 0.58 M/S
RA MAJOR: 4.55 CM
RA PRESSURE: 3 MMHG
RA WIDTH: 4.17 CM
RBC # BLD AUTO: 5.5 M/UL (ref 4.6–6.2)
SAMPLE: NORMAL
SAMPLE: NORMAL
SITE: NORMAL
SITE: NORMAL
SODIUM SERPL-SCNC: 135 MMOL/L (ref 136–145)
SP GR UR STRIP: <=1.005 (ref 1–1.03)
SQUAMOUS #/AREA URNS HPF: 1 /HPF
STJ: 2.69 CM
TDI LATERAL: 0.1 M/S
TDI SEPTAL: 0.08 M/S
TDI: 0.09 M/S
TOXICOLOGY INFORMATION: ABNORMAL
TR MAX PG: 12 MMHG
TRICUSPID ANNULAR PLANE SYSTOLIC EXCURSION: 2.79 CM
TRIGL SERPL-MCNC: 156 MG/DL (ref 30–150)
TROPONIN I SERPL DL<=0.01 NG/ML-MCNC: <0.006 NG/ML (ref 0–0.03)
TSH SERPL DL<=0.005 MIU/L-ACNC: 0.95 UIU/ML (ref 0.4–4)
TV REST PULMONARY ARTERY PRESSURE: 15 MMHG
URN SPEC COLLECT METH UR: ABNORMAL
UROBILINOGEN UR STRIP-ACNC: NEGATIVE EU/DL
WBC # BLD AUTO: 7.34 K/UL (ref 3.9–12.7)
YEAST URNS QL MICRO: NORMAL

## 2020-03-02 PROCEDURE — 25000003 PHARM REV CODE 250: Performed by: STUDENT IN AN ORGANIZED HEALTH CARE EDUCATION/TRAINING PROGRAM

## 2020-03-02 PROCEDURE — 25500020 PHARM REV CODE 255: Performed by: FAMILY MEDICINE

## 2020-03-02 PROCEDURE — 99291 CRITICAL CARE FIRST HOUR: CPT

## 2020-03-02 PROCEDURE — 85610 PROTHROMBIN TIME: CPT

## 2020-03-02 PROCEDURE — 97535 SELF CARE MNGMENT TRAINING: CPT

## 2020-03-02 PROCEDURE — 25000003 PHARM REV CODE 250: Performed by: EMERGENCY MEDICINE

## 2020-03-02 PROCEDURE — 96372 THER/PROPH/DIAG INJ SC/IM: CPT | Mod: 59 | Performed by: EMERGENCY MEDICINE

## 2020-03-02 PROCEDURE — 80307 DRUG TEST PRSMV CHEM ANLYZR: CPT

## 2020-03-02 PROCEDURE — G0378 HOSPITAL OBSERVATION PER HR: HCPCS

## 2020-03-02 PROCEDURE — 99900035 HC TECH TIME PER 15 MIN (STAT)

## 2020-03-02 PROCEDURE — 82565 ASSAY OF CREATININE: CPT

## 2020-03-02 PROCEDURE — 83036 HEMOGLOBIN GLYCOSYLATED A1C: CPT

## 2020-03-02 PROCEDURE — 80061 LIPID PANEL: CPT

## 2020-03-02 PROCEDURE — 96360 HYDRATION IV INFUSION INIT: CPT | Mod: 59 | Performed by: EMERGENCY MEDICINE

## 2020-03-02 PROCEDURE — 96361 HYDRATE IV INFUSION ADD-ON: CPT | Performed by: EMERGENCY MEDICINE

## 2020-03-02 PROCEDURE — 81000 URINALYSIS NONAUTO W/SCOPE: CPT | Mod: 59

## 2020-03-02 PROCEDURE — 63600175 PHARM REV CODE 636 W HCPCS: Performed by: STUDENT IN AN ORGANIZED HEALTH CARE EDUCATION/TRAINING PROGRAM

## 2020-03-02 PROCEDURE — 82962 GLUCOSE BLOOD TEST: CPT

## 2020-03-02 PROCEDURE — 82550 ASSAY OF CK (CPK): CPT

## 2020-03-02 PROCEDURE — 94761 N-INVAS EAR/PLS OXIMETRY MLT: CPT

## 2020-03-02 PROCEDURE — 92610 EVALUATE SWALLOWING FUNCTION: CPT

## 2020-03-02 PROCEDURE — 93005 ELECTROCARDIOGRAM TRACING: CPT

## 2020-03-02 PROCEDURE — 84484 ASSAY OF TROPONIN QUANT: CPT

## 2020-03-02 PROCEDURE — 85025 COMPLETE CBC W/AUTO DIFF WBC: CPT

## 2020-03-02 PROCEDURE — 84443 ASSAY THYROID STIM HORMONE: CPT

## 2020-03-02 PROCEDURE — 97161 PT EVAL LOW COMPLEX 20 MIN: CPT

## 2020-03-02 PROCEDURE — 80053 COMPREHEN METABOLIC PANEL: CPT

## 2020-03-02 PROCEDURE — 97165 OT EVAL LOW COMPLEX 30 MIN: CPT

## 2020-03-02 RX ORDER — INSULIN ASPART 100 [IU]/ML
0-5 INJECTION, SOLUTION INTRAVENOUS; SUBCUTANEOUS EVERY 6 HOURS PRN
Status: DISCONTINUED | OUTPATIENT
Start: 2020-03-02 | End: 2020-03-03 | Stop reason: HOSPADM

## 2020-03-02 RX ORDER — ASPIRIN 325 MG
325 TABLET ORAL
Status: COMPLETED | OUTPATIENT
Start: 2020-03-02 | End: 2020-03-02

## 2020-03-02 RX ORDER — ATORVASTATIN CALCIUM 40 MG/1
40 TABLET, FILM COATED ORAL DAILY
Status: DISCONTINUED | OUTPATIENT
Start: 2020-03-02 | End: 2020-03-02

## 2020-03-02 RX ORDER — GLUCAGON 1 MG
1 KIT INJECTION
Status: DISCONTINUED | OUTPATIENT
Start: 2020-03-02 | End: 2020-03-03 | Stop reason: HOSPADM

## 2020-03-02 RX ORDER — DEXTROSE 50 % IN WATER (D50W) INTRAVENOUS SYRINGE
12.5
Status: DISCONTINUED | OUTPATIENT
Start: 2020-03-02 | End: 2020-03-03 | Stop reason: HOSPADM

## 2020-03-02 RX ORDER — ATORVASTATIN CALCIUM 20 MG/1
40 TABLET, FILM COATED ORAL DAILY
Status: DISCONTINUED | OUTPATIENT
Start: 2020-03-02 | End: 2020-03-03 | Stop reason: HOSPADM

## 2020-03-02 RX ORDER — SODIUM CHLORIDE 0.9 % (FLUSH) 0.9 %
10 SYRINGE (ML) INJECTION
Status: DISCONTINUED | OUTPATIENT
Start: 2020-03-02 | End: 2020-03-03 | Stop reason: HOSPADM

## 2020-03-02 RX ORDER — ENOXAPARIN SODIUM 100 MG/ML
40 INJECTION SUBCUTANEOUS EVERY 12 HOURS
Status: CANCELLED | OUTPATIENT
Start: 2020-03-02

## 2020-03-02 RX ORDER — ASPIRIN 81 MG/1
81 TABLET ORAL DAILY
Status: DISCONTINUED | OUTPATIENT
Start: 2020-03-03 | End: 2020-03-03 | Stop reason: HOSPADM

## 2020-03-02 RX ADMIN — IOHEXOL 100 ML: 350 INJECTION, SOLUTION INTRAVENOUS at 07:03

## 2020-03-02 RX ADMIN — ATORVASTATIN CALCIUM 40 MG: 40 TABLET, FILM COATED ORAL at 09:03

## 2020-03-02 RX ADMIN — ASPIRIN 325 MG ORAL TABLET 325 MG: 325 PILL ORAL at 06:03

## 2020-03-02 RX ADMIN — INSULIN ASPART 3 UNITS: 100 INJECTION, SOLUTION INTRAVENOUS; SUBCUTANEOUS at 02:03

## 2020-03-02 RX ADMIN — SODIUM CHLORIDE 1000 ML: 0.9 INJECTION, SOLUTION INTRAVENOUS at 03:03

## 2020-03-02 RX ADMIN — INSULIN ASPART 1 UNITS: 100 INJECTION, SOLUTION INTRAVENOUS; SUBCUTANEOUS at 10:03
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
## 2020-03-02 NOTE — PROVIDER PROGRESS NOTES - EMERGENCY DEPT.
Encounter Date: 3/2/2020    ED Physician Progress Notes        Physician Note:   44-year-old male with history type 2 diabetes on metformin presents complaining feeling jittery.  States that he and his wife were driving back from [place] overnight.  Over the last few hours he has had several cups of coffee and been adding caffeine shots to the coffee.  States over the last hours developed the generalized, throbbing headache as well as feeling jittery, palpitations, nausea.  States he has some tingling to his left upper extremity as well.  Denies shortness of breath. Symptoms are not exertional.  No other symptoms reported at this time.    Patient seen by me in triage due to ED overcrowding.  I have placed preliminary orders.  Patient's care will be transitioned to a provider when a bed in the main emergency department is available.
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
## 2020-03-02 NOTE — NURSING
Patient arrived to unit from ED via wheelchair.   Patient in room 476.  Transferred into bed with 1 person assist.   Bedside report given by Dick BAPTISTE.  Charge nurse advised of patient arrival.   VS currently stable.   Tele monitor [id number] applied.   Patient oriented to room, rounding sheet and call bell.   Bed in lowest position, call light in reach.  Encouraged to notify of all needs.   Will continue to monitor.  Complain of leg cramp during Neuro check. Dr. Ruiz team notified.
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
## 2020-03-02 NOTE — HPI
"43 yo M with a PMHx include diabetes presents to [facility] ED for evaluation of severe headache since 5AM this morning. Pt reports staying up all night and drinking an excessive amount of caffeine to stay awake while driving (4+ cups of coffee with 4 "energy shots"). Pt describes headache as severe but gradual in onset over ~30 minutes. Associated symptoms included dizziness and left-arm numbness. Pt denies recent alcohol use and recreational drug use. Reports being compliant with home medications. Per ED staff, pt w/ abnormal neuro exam on presentation - code stroke called and vascular neurology consulted. On reassessment, prior to Family Medicine being called for admission, pt was reassessed and found to have full resolution of symptoms. Pt current w/ no complaints.   "

## 2020-03-02 NOTE — ED NOTES
Upon return to room from CT scan, Dr. Reece notified that all neuro symptoms are resolved. MD arrived to bedside to re-examine patient.

## 2020-03-02 NOTE — ASSESSMENT & PLAN NOTE
- Pt initially presented with abnormal neuro exam, symptoms lasted less than one hour, asymptomatic on reassessment  - Code stroke called by ED, vascular neurology consulted - will f/u recommendations  - Received  mg x1 in ED  - Labs and EKG obtained on admission wnl  - CT head and CTA head and neck without significant findings   - Echo w/ bubble study ordered - will f/u   - Continue to monitor

## 2020-03-02 NOTE — PT/OT/SLP PROGRESS
Occupational Therapy  Missed visit    Patient Name:  Andrea Fernández   MRN:  [id number]    Patient not seen today secondary to  ONUR for testing. Will follow-up .    Jacob Ramon OT  3/2/2020
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
## 2020-03-02 NOTE — ED NOTES
Dr. Reece arrived to bedside while RN drawing labs. Pt began to have delayed speech. L facial droop, report of L arm numbness. This is different than the patient was in the past 15 minutes. MD performing NIHSS assessment.

## 2020-03-02 NOTE — CARE UPDATE
Stopped by to visit with patient. He is doing well today and feels much better. Reviewed chart and Discussed plan of care with inpatient primary team. Will plan to see patient in clinic following discharge.        Debra Estrada, PGY-2  [facility]  03/02/2020 4:38 PM
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
## 2020-03-02 NOTE — PLAN OF CARE
Consult for swallow eval received this date, pt seen in ED, no audible choking or coughing noted with liquids or solids. Pt may start regular diet and thin liquids. MD called and made aware.

## 2020-03-02 NOTE — ED TRIAGE NOTES
"Pt presents to ED with report that he has been feeling dizzy and "jittery". Per pt's wife, pt and wife were driving from [place] overnight and left their home approx 1930 last night. Per pt and wife, pt began feeling extremely tired and began drinking coffee with 4 shots of caffeine (40mg each shot). Pt made at least 3 stops on the way and repeated the coffee and shot consumption each time. Pt connected to cardiac monitor, NSR with HR in the 70s. Denies CP, denies SOB. Pt reports feeling very tired. Falling asleep while talking. Wife at bedside.  "

## 2020-03-02 NOTE — ASSESSMENT & PLAN NOTE
- Last A1c of 11.8 (11/2018); A1c 10.3 and  on admission  - On admission; hold oral diabetic meds (metformin 1000 mg 24 hr tablet daily w/ breakfast)  - Started sliding scale insulin, BG goal of 140-180

## 2020-03-02 NOTE — ED NOTES
Prior to transport to CT scan, pt sat up and appeared much more alert. Pt asked what happened and what was wrong. Facial asymmetry no longer present. Pt keenly responsive and alert. No deficits noted. Pt reports that he was just very sleepy and was half asleep when Dr. Reece was performing his exam.

## 2020-03-02 NOTE — PLAN OF CARE
Problem: Physical Therapy Goal  Goal: Physical Therapy Goal  Description  Goals to be met by: 3/2/2020     No PT goals established         Outcome: Met   Recommend Home   No acute skilled PT needs  Patient independent with gait and all transitional mvts.  Will DC PT service.

## 2020-03-02 NOTE — ASSESSMENT & PLAN NOTE
- Last A1c of 11.8 (11/2018),  on admisison  - On admission; hold oral diabetic meds (metformin)  - Started sliding scale insulin, BG goal of 140-180
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
## 2020-03-02 NOTE — ASSESSMENT & PLAN NOTE
- Pt initially presented with abnormal neuro exam, asymptomatic on reassessment  - Code stroke called by ED, vascular neurology consulted, pt received  mg x1  - /96 on presentation, decreased to 110-120s systolic upon reassessment by admitting team  - Labs admission wnl  - EKG w/ normal sinus rhythm  - CXR w/ no acute cardiopulmonary processes  - CT head and CTA head and neck w/ no acute findings  - Echo w/ bubble study showed EF 55%, normal systolic and diastolic function, and negative bubble study   - ABCD2 score 5  - Neuro checks q4h, oxygen supplementation via nasal cannula as needed  - Passed formal swallow study 3/2PM - diet transitioned to adult diabetic diet  - Atorvastatin 40 mg daily started on 3/2AM, will start ASA 81 mg daily on 3/3AM  - Per neuro recommendations, transient neurological deficit likely 2/2 increased caffeine intake, continuing ASA 81 mg daily upon discharge  - Continue to monitor

## 2020-03-03 VITALS
HEIGHT: 68 IN | WEIGHT: 201.94 LBS | HEART RATE: 81 BPM | TEMPERATURE: 98 F | DIASTOLIC BLOOD PRESSURE: 87 MMHG | SYSTOLIC BLOOD PRESSURE: 134 MMHG | BODY MASS INDEX: 30.61 KG/M2 | OXYGEN SATURATION: 99 % | RESPIRATION RATE: 18 BRPM

## 2020-03-03 PROBLEM — G45.9 TIA (TRANSIENT ISCHEMIC ATTACK): Status: RESOLVED | Noted: 2020-03-02 | Resolved: 2020-03-03

## 2020-03-03 LAB
ALBUMIN SERPL BCP-MCNC: 4.1 G/DL (ref 3.5–5.2)
ALP SERPL-CCNC: 67 U/L (ref 55–135)
ALT SERPL W/O P-5'-P-CCNC: 48 U/L (ref 10–44)
ANION GAP SERPL CALC-SCNC: 9 MMOL/L (ref 8–16)
APTT BLDCRRT: 29.4 SEC (ref 21–32)
AST SERPL-CCNC: 24 U/L (ref 10–40)
BASOPHILS # BLD AUTO: 0.04 K/UL (ref 0–0.2)
BASOPHILS NFR BLD: 0.7 % (ref 0–1.9)
BILIRUB SERPL-MCNC: 0.9 MG/DL (ref 0.1–1)
BUN SERPL-MCNC: 13 MG/DL (ref 6–20)
CALCIUM SERPL-MCNC: 9.4 MG/DL (ref 8.7–10.5)
CHLORIDE SERPL-SCNC: 102 MMOL/L (ref 95–110)
CO2 SERPL-SCNC: 24 MMOL/L (ref 23–29)
CREAT SERPL-MCNC: 0.8 MG/DL (ref 0.5–1.4)
DIFFERENTIAL METHOD: ABNORMAL
EOSINOPHIL # BLD AUTO: 0.2 K/UL (ref 0–0.5)
EOSINOPHIL NFR BLD: 2.7 % (ref 0–8)
ERYTHROCYTE [DISTWIDTH] IN BLOOD BY AUTOMATED COUNT: 11.3 % (ref 11.5–14.5)
EST. GFR  (AFRICAN AMERICAN): >60 ML/MIN/1.73 M^2
EST. GFR  (NON AFRICAN AMERICAN): >60 ML/MIN/1.73 M^2
GLUCOSE SERPL-MCNC: 269 MG/DL (ref 70–110)
HCT VFR BLD AUTO: 49.4 % (ref 40–54)
HGB BLD-MCNC: 17.4 G/DL (ref 14–18)
IMM GRANULOCYTES # BLD AUTO: 0.02 K/UL (ref 0–0.04)
IMM GRANULOCYTES NFR BLD AUTO: 0.3 % (ref 0–0.5)
INR PPP: 1 (ref 0.8–1.2)
LYMPHOCYTES # BLD AUTO: 2.3 K/UL (ref 1–4.8)
LYMPHOCYTES NFR BLD: 39.2 % (ref 18–48)
MAGNESIUM SERPL-MCNC: 1.9 MG/DL (ref 1.6–2.6)
MCH RBC QN AUTO: 31 PG (ref 27–31)
MCHC RBC AUTO-ENTMCNC: 35.2 G/DL (ref 32–36)
MCV RBC AUTO: 88 FL (ref 82–98)
MONOCYTES # BLD AUTO: 0.5 K/UL (ref 0.3–1)
MONOCYTES NFR BLD: 7.8 % (ref 4–15)
NEUTROPHILS # BLD AUTO: 2.9 K/UL (ref 1.8–7.7)
NEUTROPHILS NFR BLD: 49.3 % (ref 38–73)
NRBC BLD-RTO: 0 /100 WBC
PHOSPHATE SERPL-MCNC: 3.7 MG/DL (ref 2.7–4.5)
PLATELET # BLD AUTO: 264 K/UL (ref 150–350)
PMV BLD AUTO: 9.7 FL (ref 9.2–12.9)
POCT GLUCOSE: 210 MG/DL (ref 70–110)
POCT GLUCOSE: 328 MG/DL (ref 70–110)
POTASSIUM SERPL-SCNC: 4 MMOL/L (ref 3.5–5.1)
PROT SERPL-MCNC: 7 G/DL (ref 6–8.4)
PROTHROMBIN TIME: 10.7 SEC (ref 9–12.5)
RBC # BLD AUTO: 5.62 M/UL (ref 4.6–6.2)
SODIUM SERPL-SCNC: 135 MMOL/L (ref 136–145)
WBC # BLD AUTO: 5.89 K/UL (ref 3.9–12.7)

## 2020-03-03 PROCEDURE — 36415 COLL VENOUS BLD VENIPUNCTURE: CPT

## 2020-03-03 PROCEDURE — 90686 IIV4 VACC NO PRSV 0.5 ML IM: CPT | Performed by: FAMILY MEDICINE

## 2020-03-03 PROCEDURE — 85025 COMPLETE CBC W/AUTO DIFF WBC: CPT

## 2020-03-03 PROCEDURE — 85610 PROTHROMBIN TIME: CPT

## 2020-03-03 PROCEDURE — 63600175 PHARM REV CODE 636 W HCPCS: Performed by: FAMILY MEDICINE

## 2020-03-03 PROCEDURE — 96372 THER/PROPH/DIAG INJ SC/IM: CPT | Mod: 59 | Performed by: EMERGENCY MEDICINE

## 2020-03-03 PROCEDURE — 96361 HYDRATE IV INFUSION ADD-ON: CPT | Performed by: EMERGENCY MEDICINE

## 2020-03-03 PROCEDURE — 25000003 PHARM REV CODE 250: Performed by: STUDENT IN AN ORGANIZED HEALTH CARE EDUCATION/TRAINING PROGRAM

## 2020-03-03 PROCEDURE — 85730 THROMBOPLASTIN TIME PARTIAL: CPT

## 2020-03-03 PROCEDURE — 90670 PCV13 VACCINE IM: CPT | Performed by: FAMILY MEDICINE

## 2020-03-03 PROCEDURE — 90472 IMMUNIZATION ADMIN EACH ADD: CPT | Performed by: FAMILY MEDICINE

## 2020-03-03 PROCEDURE — 83735 ASSAY OF MAGNESIUM: CPT

## 2020-03-03 PROCEDURE — G0378 HOSPITAL OBSERVATION PER HR: HCPCS

## 2020-03-03 PROCEDURE — 90471 IMMUNIZATION ADMIN: CPT | Performed by: FAMILY MEDICINE

## 2020-03-03 PROCEDURE — 80053 COMPREHEN METABOLIC PANEL: CPT

## 2020-03-03 PROCEDURE — 84100 ASSAY OF PHOSPHORUS: CPT

## 2020-03-03 PROCEDURE — 63600175 PHARM REV CODE 636 W HCPCS: Performed by: STUDENT IN AN ORGANIZED HEALTH CARE EDUCATION/TRAINING PROGRAM

## 2020-03-03 PROCEDURE — 94761 N-INVAS EAR/PLS OXIMETRY MLT: CPT

## 2020-03-03 RX ORDER — METFORMIN HYDROCHLORIDE 1000 MG/1
1000 TABLET ORAL 2 TIMES DAILY WITH MEALS
Qty: 180 TABLET | Refills: 3 | Status: SHIPPED | OUTPATIENT
Start: 2020-03-03 | End: 2021-04-14 | Stop reason: SDUPTHER

## 2020-03-03 RX ORDER — ASPIRIN 81 MG/1
81 TABLET ORAL DAILY
Qty: 30 TABLET | Refills: 11 | Status: SHIPPED | OUTPATIENT
Start: 2020-03-04 | End: 2021-10-04

## 2020-03-03 RX ORDER — ATORVASTATIN CALCIUM 40 MG/1
40 TABLET, FILM COATED ORAL DAILY
Qty: 90 TABLET | Refills: 3 | Status: SHIPPED | OUTPATIENT
Start: 2020-03-04 | End: 2021-10-04

## 2020-03-03 RX ADMIN — INFLUENZA VIRUS VACCINE 0.5 ML: 15; 15; 15; 15 SUSPENSION INTRAMUSCULAR at 01:03

## 2020-03-03 RX ADMIN — INSULIN ASPART 4 UNITS: 100 INJECTION, SOLUTION INTRAVENOUS; SUBCUTANEOUS at 11:03

## 2020-03-03 RX ADMIN — PNEUMOCOCCAL 13-VALENT CONJUGATE VACCINE 0.5 ML: 2.2; 2.2; 2.2; 2.2; 2.2; 4.4; 2.2; 2.2; 2.2; 2.2; 2.2; 2.2; 2.2 INJECTION, SUSPENSION INTRAMUSCULAR at 01:03

## 2020-03-03 RX ADMIN — ASPIRIN 81 MG: 81 TABLET, COATED ORAL at 08:03

## 2020-03-03 RX ADMIN — SODIUM CHLORIDE 1000 ML: 0.9 INJECTION, SOLUTION INTRAVENOUS at 08:03

## 2020-03-03 RX ADMIN — ATORVASTATIN CALCIUM 40 MG: 40 TABLET, FILM COATED ORAL at 08:03

## 2020-03-03 NOTE — PROGRESS NOTES
"[facility] Hospital Medicine  Progress Note    Patient Name: Andrea Fernández  MRN: [id number]  Patient Class: OP- Observation   Admission Date: 3/2/2020  Length of Stay: 0 days  Attending Physician: Alcides Ruiz MD  Primary Care Provider: Debra Estrada MD    Subjective:     Principal Problem:TIA (transient ischemic attack)    HPI:  45 yo M with a PMHx include diabetes presents to [facility] ED for evaluation of severe headache since 5AM this morning. Pt reports staying up all night and drinking an excessive amount of caffeine to stay awake while driving (4+ cups of coffee with 4 "energy shots"). Pt describes headache as severe but gradual in onset over ~30 minutes. Associated symptoms included dizziness and left-arm numbness. Pt denies recent alcohol use and recreational drug use. Reports being compliant with home medications. Per ED staff, pt w/ abnormal neuro exam on presentation - code stroke called and vascular neurology consulted. On reassessment, prior to Family Medicine being called for admission, pt was reassessed and found to have full resolution of symptoms. Pt current w/ no complaints.     Interval History: NAEON. Pt reports sleeping and eating well. He continues to remain asymptomatic. No complaints at this time.    Review of Systems   Constitutional: Negative for activity change, appetite change, chills, fatigue, fever and unexpected weight change.   HENT: Negative for congestion, postnasal drip, rhinorrhea, sinus pressure, sinus pain, sneezing and sore throat.    Eyes: Negative for visual disturbance.   Respiratory: Negative for cough and shortness of breath.    Cardiovascular: Negative for chest pain, palpitations and leg swelling.   Gastrointestinal: Negative for abdominal distention, abdominal pain, blood in stool, constipation, diarrhea, nausea and vomiting.   Endocrine: Negative for polydipsia, polyphagia and polyuria.   Genitourinary: Negative for decreased urine volume, " difficulty urinating, dysuria, enuresis, flank pain, frequency, hematuria and urgency.   Musculoskeletal: Negative for arthralgias, back pain, myalgias and neck pain.   Skin: Negative for color change, pallor, rash and wound.   Allergic/Immunologic: Negative.    Neurological: Negative for dizziness, tremors, syncope, weakness, light-headedness, numbness and headaches.   Hematological: Negative.    Psychiatric/Behavioral: Negative for confusion, decreased concentration, dysphoric mood and sleep disturbance. The patient is not nervous/anxious.      Objective:     Vital Signs (Most Recent):  Temp: 97.6 °F (36.4 °C) (03/03/20 0705)  Pulse: 61 (03/03/20 0803)  Resp: 18 (03/03/20 0705)  BP: (!) 147/88 (03/03/20 0705)  SpO2: 99 % (03/03/20 0732) Vital Signs (24h Range):  Temp:  [97.6 °F (36.4 °C)-98.8 °F (37.1 °C)] 97.6 °F (36.4 °C)  Pulse:  [55-81] 61  Resp:  [16-31] 18  SpO2:  [95 %-100 %] 99 %  BP: (115-147)/(73-88) 147/88     Weight: 91.6 kg (201 lb 15.1 oz)  Body mass index is 30.71 kg/m².  No intake or output data in the 24 hours ending 03/03/20 0848     Physical Exam   Constitutional: He is oriented to person, place, and time. He appears well-developed and well-nourished. No distress.   HENT:   Head: Normocephalic and atraumatic.   Right Ear: External ear normal.   Left Ear: External ear normal.   Nose: Nose normal.   Mouth/Throat: Oropharynx is clear and moist. No oropharyngeal exudate.   Eyes: Pupils are equal, round, and reactive to light. Conjunctivae and EOM are normal. Right eye exhibits no discharge. Left eye exhibits no discharge. No scleral icterus.   Neck: Normal range of motion. Neck supple. No JVD present. No tracheal deviation present. No thyromegaly present.   Cardiovascular: Normal rate, regular rhythm, normal heart sounds and intact distal pulses. Exam reveals no gallop and no friction rub.   No murmur heard.  Pulmonary/Chest: Effort normal and breath sounds normal. No stridor. No respiratory  distress. He has no wheezes. He has no rales. He exhibits no tenderness.   Abdominal: Soft. Bowel sounds are normal. He exhibits no distension and no mass. There is no tenderness. There is no guarding.   Musculoskeletal: Normal range of motion. He exhibits no edema, tenderness or deformity.   Lymphadenopathy:     He has no cervical adenopathy.   Neurological: He is alert and oriented to person, place, and time. He displays normal reflexes. No cranial nerve deficit or sensory deficit. He exhibits normal muscle tone. Coordination normal.   Skin: Skin is warm and dry. Capillary refill takes less than 2 seconds. No rash noted. He is not diaphoretic. No erythema. No pallor.   Psychiatric: He has a normal mood and affect. His behavior is normal. Judgment and thought content normal.   Nursing note and vitals reviewed.    Significant Labs:   Recent Labs   Lab 03/02/20  1148   HGBA1C 10.3*     Recent Labs   Lab 03/02/20  0652 03/03/20  0604   BILITOT 0.8 0.9     Recent Labs   Lab 03/02/20  0652 03/03/20  0604   WBC 7.34 5.89   HGB 17.4 17.4   HCT 47.0 49.4    264     Recent Labs   Lab 03/02/20  0652 03/03/20  0604   * 135*   K 3.8 4.0    102   CO2 22* 24   * 269*   BUN 9 13   CREATININE 0.8 0.8   CALCIUM 9.5 9.4   PROT 7.4 7.0   ALBUMIN 4.4 4.1   BILITOT 0.8 0.9   ALKPHOS 71 67   AST 24 24   ALT 49* 48*   ANIONGAP 11 9   EGFRNONAA >60 >60     Recent Labs   Lab 03/03/20  0604   INR 1.0   APTT 29.4     Recent Labs   Lab 03/02/20  0652   CHOL 157   HDL 31*   LDLCALC 94.8   TRIG 156*   CHOLHDL 19.7*     Recent Labs   Lab 03/03/20  0604   MG 1.9     Recent Labs   Lab 03/02/20  1742 03/02/20  2105 03/03/20  0451   POCTGLUCOSE 256* 221* 210*     Recent Labs   Lab 03/02/20  0652   TROPONINI <0.006     Recent Labs   Lab 03/02/20  0652   TSH 0.950     Recent Labs   Lab 03/02/20  0709   COLORU Yellow   APPEARANCEUA Clear   PHUR 7.0   SPECGRAV <=1.005*   PROTEINUA Negative   GLUCUA 3+*   KETONESU Negative    BILIRUBINUA Negative   OCCULTUA Negative   NITRITE Negative   UROBILINOGEN Negative   LEUKOCYTESUR Negative   BACTERIA None   SQUAMEPITHEL 1     Significant Imaging:  Imaging Results          CTA Head and Neck (xpd) (Final result)  Result time 03/02/20 09:08:26    Final result by Chaitanya Rea MD (03/02/20 09:08:26)                 Impression:      1. No CT findings to suggest an acute major vascular distribution infarct or intracranial hemorrhage.  2. Unremarkable CTA head and neck specifically without focal stenosis, occlusion or aneurysm.  3. Asymmetric prominence of the right submandibular gland with a few slightly prominent surrounding lymph nodes, a finding of uncertain clinical significance.  Clinical correlation recommended.      Electronically signed by: Chaitanya Rea MD  Date:    03/02/2020  Time:    09:08             Narrative:    EXAMINATION:  CTA HEAD AND NECK (XPD)    CLINICAL HISTORY:  Focal neuro deficit, new, fixed or worsening, <6 hours;    TECHNIQUE:  Non contrast low dose axial images were obtained through the head.  CT angiogram was performed from the level of the jaylin to the top of the head following the IV administration of 100mL of Omnipaque 350.   Sagittal and coronal reconstructions and maximum intensity projection reconstructions were performed. Arterial stenosis percentages are based on NASCET measurement criteria.    COMPARISON:  CT 03/02/2020.    FINDINGS:  CT head:    No CT findings to suggest an acute major vascular distribution infarct.  No intracranial hemorrhage.  No hydrocephalus.  No midline shift or mass effect.  No abnormal intra or extra-axial fluid collection.  No pathologic enhancement on post-contrast images.    Paranasal sinuses and mastoid air cells are clear.  No acute osseous abnormalities.  Globes are symmetric.    CTA head:    Major intracranial vessels are patent without focal stenosis, occlusion or aneurysm.    CTA neck:    Left-sided aortic arch with 3  branch vessels.  Major vessels of the neck are patent without focal stenosis or occlusion.    CT neck:    Visualized parotid and thyroid glands are normal.  There is asymmetric prominence of the right submandibular gland, nonspecific.  There are a few slightly prominent right level 2A cervical lymph nodes, the largest of which measures 1.2 cm in short axis and is located within the retromandibular region.    Prevertebral soft tissues are normal.    No focal pharyngeal or laryngeal masses.    There is a 1.1 cm irregular solid opacity within the left lung apex, similar to slightly decreased in size when compared to CT dated 09/07/2018.  No pneumothorax.    Subcutaneous soft tissues are within normal limits.    There is a moderate central/left paracentral broad-based disc protrusion at C5-C6 that appears to efface the ventral thecal sac and likely abuts the spinal cord resulting in mild spinal canal stenosis.                               CT Head Without Contrast (Final result)  Result time 03/02/20 07:14:15    Final result by Zuleima Davenport MD (03/02/20 07:14:15)                 Impression:      No CT evidence of acute intracranial abnormality.  There is clinical concern for acute ischemia, further evaluation with MRI is recommended if there are no clinical contraindications.      Electronically signed by: Zuleima Davenport MD  Date:    03/02/2020  Time:    07:14             Narrative:    EXAMINATION:  CT HEAD WITHOUT CONTRAST    CLINICAL HISTORY:  Dizziness;Stroke;Focal neuro deficit, new, fixed or worsening, <6 hours;    TECHNIQUE:  Low dose axial images were obtained through the head.  Coronal and sagittal reformations were also performed. Contrast was not administered.    COMPARISON:  None.    FINDINGS:  There is no acute intracranial hemorrhage, hydrocephalus, midline shift or mass effect. Gray-white matter differentiation appears maintained. The basal cisterns are patent. The mastoid air cells and paranasal sinuses  are clear of acute process. The visualized bones of the calvarium demonstrate no acute osseous abnormality.                               X-Ray Chest AP Portable (Final result)  Result time 03/02/20 06:31:13    Final result by Zuleima Davenport MD (03/02/20 06:31:13)                 Impression:      No acute intrathoracic abnormality identified on this single radiographic view of the chest.      Electronically signed by: Zuleima Davenport MD  Date:    03/02/2020  Time:    06:31             Narrative:    EXAMINATION:  XR CHEST AP PORTABLE    CLINICAL HISTORY:  Chest Pain;    TECHNIQUE:  Single frontal view of the chest was performed.    COMPARISON:  12/26/2018    FINDINGS:  Cardiac monitoring leads overlie the chest.  The cardiomediastinal silhouette is within normal limits.  Visualized airway is unremarkable.  The lungs appear symmetrically aerated without definite focal alveolar consolidation. No large pleural effusion or pneumothorax is appreciated.Visualized osseous structures are intact.                              Assessment/Plan:      * TIA (transient ischemic attack)  - Pt initially presented with abnormal neuro exam, asymptomatic on reassessment  - Code stroke called by ED, vascular neurology consulted, pt received  mg x1  - /96 on presentation, decreased to 110-120s systolic upon reassessment by admitting team  - Labs admission wnl  - EKG w/ normal sinus rhythm  - CXR w/ no acute cardiopulmonary processes  - CT head and CTA head and neck w/ no acute findings  - Echo w/ bubble study showed EF 55%, normal systolic and diastolic function, and negative bubble study   - ABCD2 score 5  - Neuro checks q4h, oxygen supplementation via nasal cannula as needed  - Passed formal swallow study 3/2PM - diet transitioned to adult diabetic diet  - Atorvastatin 40 mg daily started on 3/2AM, will start ASA 81 mg daily on 3/3AM  - Per neuro recommendations, transient neurological deficit likely 2/2 increased caffeine  intake, continuing ASA 81 mg daily upon discharge  - Continue to monitor    Uncontrolled type 2 diabetes mellitus without complication, without long-term current use of insulin  - Last A1c of 11.8 (11/2018); A1c 10.3 and  on admission  - On admission; hold oral diabetic meds (metformin 1000 mg 24 hr tablet daily w/ breakfast)  - Started sliding scale insulin, BG goal of 140-180      VTE Risk Mitigation (From admission, onward)         Ordered     IP VTE HIGH RISK PATIENT  Once      03/02/20 1108     Place sequential compression device  Until discontinued      03/02/20 1108              Diet: Diabetic  Code: Full  Dispo: Likely d/c to home today    Elisabeth García MD  [employer] Family [facility] PGY-1  [facility]

## 2020-03-03 NOTE — DISCHARGE SUMMARY
"[facility] Family Medicine  Discharge Summary      Patient Name: Andrea Fernández  MRN: [id number]  Admission Date: 3/2/2020  Hospital Length of Stay: 0 days  Discharge Date and Time:  03/03/2020 1:31 PM  Attending Physician: Alcides Ruiz MD   Discharging Provider: Elisabeth García MD  Primary Care Provider: Debra Estrada MD    HPI:   45 yo M with a PMHx include diabetes presents to [facility] ED for evaluation of severe headache since 5AM this morning. Pt reports staying up all night and drinking an excessive amount of caffeine to stay awake while driving (4+ cups of coffee with 4 "energy shots"). Pt describes headache as severe but gradual in onset over ~30 minutes. Associated symptoms included dizziness and left-arm numbness. Pt denies recent alcohol use and recreational drug use. Reports being compliant with home medications. Per ED staff, pt w/ abnormal neuro exam on presentation - code stroke called and vascular neurology consulted. On reassessment, prior to Family Medicine being called for admission, pt was reassessed and found to have full resolution of symptoms. Pt current w/ no complaints.     Hospital Course:   Pt admitted to [facility] for suspected TIA with ABCD2 score of 5. EKG w/ normal sinus rhythm. CXR w/ no acute cardiopulmonary processes. CT head and CTA head and neck w/ no acute findings. Echo w/ bubble study showed EF 55%, normal systolic and diastolic function, and negative bubble study. Pt remained asymptomatic and stable throughout hospital stay. Per neuro recommendations, transient neurological deficit likely 2/2 increased caffeine intake - continue  ASA 81 mg daily  and lipitor 40 mg daily upon discharge. Pt informed of plan to continue medications in addition to following up with his PCP for management of his diabetes. Pt expressed understanding of plan and all questions answered. Close return precautions given.     Consults:   Consults (From admission, onward) "        Status Ordering Provider     Inpatient consult to Neurology  Once     Provider:  (Not yet assigned)    Completed LISETTE WHEATLEY     Inpatient consult to Registered Dietitian/Nutritionist  Once     Provider:  (Not yet assigned)    Acknowledged LISETTE WHEATLEY     Inpatient consult to Telemedicine-Stroke  Once     Provider:  (Not yet assigned)    Acknowledged LISETTE WHEATLEY     IP consult to case management/social work  Once     Provider:  (Not yet assigned)    Acknowledged LISETTE WHEATLEY        Final Active Diagnoses:    Diagnosis Date Noted POA    Uncontrolled type 2 diabetes mellitus without complication, without long-term current use of insulin [E11.65] 12/19/2016 Yes      Problems Resolved During this Admission:    Diagnosis Date Noted Date Resolved POA    PRINCIPAL PROBLEM:  TIA (transient ischemic attack) [G45.9] 03/02/2020 03/03/2020 Yes     Discharged Condition: good    Disposition: Home or Self Care    Follow Up:  Follow-up Information     Debra Estrada MD In 1 week.    Specialty:  Family Medicine  Why:  Follow up hospital stay  Contact information:  Julito REDD [address] [address] [phone]                 Patient Instructions:      Diet diabetic     Notify your health care provider if you experience any of the following:  temperature >100.4     Notify your health care provider if you experience any of the following:  severe persistent headache     Notify your health care provider if you experience any of the following:  persistent dizziness, light-headedness, or visual disturbances     Notify your health care provider if you experience any of the following:  increased confusion or weakness     Activity as tolerated     Significant Diagnostic Studies:  Recent Labs   Lab 03/02/20  0652 03/03/20  0604   * 269*   * 135*   K 3.8 4.0    102   CO2 22* 24   BUN 9 13   CREATININE 0.8 0.8   CALCIUM 9.5 9.4   MG  --  1.9     Recent Labs   Lab 03/02/20  0652  03/03/20  0604   * 135*   K 3.8 4.0    102   CO2 22* 24   * 269*   BUN 9 13   CREATININE 0.8 0.8   CALCIUM 9.5 9.4   PROT 7.4 7.0   ALBUMIN 4.4 4.1   BILITOT 0.8 0.9   ALKPHOS 71 67   AST 24 24   ALT 49* 48*   ANIONGAP 11 9   ESTGFRAFRICA >60 >60   EGFRNONAA >60 >60     Recent Labs   Lab 03/02/20  0652 03/03/20  0604   WBC 7.34 5.89   HGB 17.4 17.4   HCT 47.0 49.4    264     Lab Results   Component Value Date    INR 1.0 03/03/2020    INR 1.1 03/02/2020     Lab Results   Component Value Date    CHOL 157 03/02/2020    HDL 31 (L) 03/02/2020    LDLCALC 94.8 03/02/2020    TRIG 156 (H) 03/02/2020    CHOLHDL 19.7 (L) 03/02/2020     Recent Labs   Lab 03/02/20  0652   TROPONINI <0.006     Recent Labs   Lab 03/02/20  1148   HGBA1C 10.3*     Imaging Results          CTA Head and Neck (xpd) (Final result)  Result time 03/02/20 09:08:26    Final result by Chaitanya Rea MD (03/02/20 09:08:26)                 Impression:      1. No CT findings to suggest an acute major vascular distribution infarct or intracranial hemorrhage.  2. Unremarkable CTA head and neck specifically without focal stenosis, occlusion or aneurysm.  3. Asymmetric prominence of the right submandibular gland with a few slightly prominent surrounding lymph nodes, a finding of uncertain clinical significance.  Clinical correlation recommended.      Electronically signed by: Chaitanya Rea MD  Date:    03/02/2020  Time:    09:08             Narrative:    EXAMINATION:  CTA HEAD AND NECK (XPD)    CLINICAL HISTORY:  Focal neuro deficit, new, fixed or worsening, <6 hours;    TECHNIQUE:  Non contrast low dose axial images were obtained through the head.  CT angiogram was performed from the level of the jaylin to the top of the head following the IV administration of 100mL of Omnipaque 350.   Sagittal and coronal reconstructions and maximum intensity projection reconstructions were performed. Arterial stenosis percentages are based on  NASCET measurement criteria.    COMPARISON:  CT 03/02/2020.    FINDINGS:  CT head:    No CT findings to suggest an acute major vascular distribution infarct.  No intracranial hemorrhage.  No hydrocephalus.  No midline shift or mass effect.  No abnormal intra or extra-axial fluid collection.  No pathologic enhancement on post-contrast images.    Paranasal sinuses and mastoid air cells are clear.  No acute osseous abnormalities.  Globes are symmetric.    CTA head:    Major intracranial vessels are patent without focal stenosis, occlusion or aneurysm.    CTA neck:    Left-sided aortic arch with 3 branch vessels.  Major vessels of the neck are patent without focal stenosis or occlusion.    CT neck:    Visualized parotid and thyroid glands are normal.  There is asymmetric prominence of the right submandibular gland, nonspecific.  There are a few slightly prominent right level 2A cervical lymph nodes, the largest of which measures 1.2 cm in short axis and is located within the retromandibular region.    Prevertebral soft tissues are normal.    No focal pharyngeal or laryngeal masses.    There is a 1.1 cm irregular solid opacity within the left lung apex, similar to slightly decreased in size when compared to CT dated 09/07/2018.  No pneumothorax.    Subcutaneous soft tissues are within normal limits.    There is a moderate central/left paracentral broad-based disc protrusion at C5-C6 that appears to efface the ventral thecal sac and likely abuts the spinal cord resulting in mild spinal canal stenosis.                               CT Head Without Contrast (Final result)  Result time 03/02/20 07:14:15    Final result by Zuleima Davenport MD (03/02/20 07:14:15)                 Impression:      No CT evidence of acute intracranial abnormality.  There is clinical concern for acute ischemia, further evaluation with MRI is recommended if there are no clinical contraindications.      Electronically signed by: Zuleima Davenport  MD  Date:    03/02/2020  Time:    07:14             Narrative:    EXAMINATION:  CT HEAD WITHOUT CONTRAST    CLINICAL HISTORY:  Dizziness;Stroke;Focal neuro deficit, new, fixed or worsening, <6 hours;    TECHNIQUE:  Low dose axial images were obtained through the head.  Coronal and sagittal reformations were also performed. Contrast was not administered.    COMPARISON:  None.    FINDINGS:  There is no acute intracranial hemorrhage, hydrocephalus, midline shift or mass effect. Gray-white matter differentiation appears maintained. The basal cisterns are patent. The mastoid air cells and paranasal sinuses are clear of acute process. The visualized bones of the calvarium demonstrate no acute osseous abnormality.                               X-Ray Chest AP Portable (Final result)  Result time 03/02/20 06:31:13    Final result by Zuleima Davenport MD (03/02/20 06:31:13)                 Impression:      No acute intrathoracic abnormality identified on this single radiographic view of the chest.      Electronically signed by: Zuleima Davenport MD  Date:    03/02/2020  Time:    06:31             Narrative:    EXAMINATION:  XR CHEST AP PORTABLE    CLINICAL HISTORY:  Chest Pain;    TECHNIQUE:  Single frontal view of the chest was performed.    COMPARISON:  12/26/2018    FINDINGS:  Cardiac monitoring leads overlie the chest.  The cardiomediastinal silhouette is within normal limits.  Visualized airway is unremarkable.  The lungs appear symmetrically aerated without definite focal alveolar consolidation. No large pleural effusion or pneumothorax is appreciated.Visualized osseous structures are intact.                              Medications:  Reconciled Home Medications:      Medication List      START taking these medications    aspirin 81 MG EC tablet  Commonly known as:  ECOTRIN  Take 1 tablet (81 mg total) by mouth once daily.  Start taking on:  March 4, 2020     atorvastatin 40 MG tablet  Commonly known as:  LIPITOR  Take 1  tablet (40 mg total) by mouth once daily.  Start taking on:  March 4, 2020     metFORMIN 1000 MG tablet  Commonly known as:  GLUCOPHAGE  Take 1 tablet (1,000 mg total) by mouth 2 (two) times daily with meals.  Replaces:  metFORMIN 500 MG XR 24hr tablet        STOP taking these medications    metFORMIN 500 MG XR 24hr tablet  Commonly known as:  Glucophage XR  Replaced by:  metFORMIN 1000 MG tablet          Indwelling Lines/Drains at time of discharge:   Lines/Drains/Airways     None               Time spent on the discharge of patient: 30 minutes  Patient was seen and examined on the date of discharge and determined to be suitable for discharge.         Elisabeth García MD  Family Medicine PGY-1  [facility]

## 2020-03-03 NOTE — SUBJECTIVE & OBJECTIVE
Interval History: NAEON. Pt reports sleeping and eating well. He continues to remain asymptomatic. No complaints at this time.    Review of Systems   Constitutional: Negative for activity change, appetite change, chills, fatigue, fever and unexpected weight change.   HENT: Negative for congestion, postnasal drip, rhinorrhea, sinus pressure, sinus pain, sneezing and sore throat.    Eyes: Negative for visual disturbance.   Respiratory: Negative for cough and shortness of breath.    Cardiovascular: Negative for chest pain, palpitations and leg swelling.   Gastrointestinal: Negative for abdominal distention, abdominal pain, blood in stool, constipation, diarrhea, nausea and vomiting.   Endocrine: Negative for polydipsia, polyphagia and polyuria.   Genitourinary: Negative for decreased urine volume, difficulty urinating, dysuria, enuresis, flank pain, frequency, hematuria and urgency.   Musculoskeletal: Negative for arthralgias, back pain, myalgias and neck pain.   Skin: Negative for color change, pallor, rash and wound.   Allergic/Immunologic: Negative.    Neurological: Negative for dizziness, tremors, syncope, weakness, light-headedness, numbness and headaches.   Hematological: Negative.    Psychiatric/Behavioral: Negative for confusion, decreased concentration, dysphoric mood and sleep disturbance. The patient is not nervous/anxious.      Objective:     Vital Signs (Most Recent):  Temp: 97.6 °F (36.4 °C) (03/03/20 0705)  Pulse: 61 (03/03/20 0803)  Resp: 18 (03/03/20 0705)  BP: (!) 147/88 (03/03/20 0705)  SpO2: 99 % (03/03/20 0732) Vital Signs (24h Range):  Temp:  [97.6 °F (36.4 °C)-98.8 °F (37.1 °C)] 97.6 °F (36.4 °C)  Pulse:  [55-81] 61  Resp:  [16-31] 18  SpO2:  [95 %-100 %] 99 %  BP: (115-147)/(73-88) 147/88     Weight: 91.6 kg (201 lb 15.1 oz)  Body mass index is 30.71 kg/m².  No intake or output data in the 24 hours ending 03/03/20 0848     Physical Exam   Constitutional: He is oriented to person, place, and  time. He appears well-developed and well-nourished. No distress.   HENT:   Head: Normocephalic and atraumatic.   Right Ear: External ear normal.   Left Ear: External ear normal.   Nose: Nose normal.   Mouth/Throat: Oropharynx is clear and moist. No oropharyngeal exudate.   Eyes: Pupils are equal, round, and reactive to light. Conjunctivae and EOM are normal. Right eye exhibits no discharge. Left eye exhibits no discharge. No scleral icterus.   Neck: Normal range of motion. Neck supple. No JVD present. No tracheal deviation present. No thyromegaly present.   Cardiovascular: Normal rate, regular rhythm, normal heart sounds and intact distal pulses. Exam reveals no gallop and no friction rub.   No murmur heard.  Pulmonary/Chest: Effort normal and breath sounds normal. No stridor. No respiratory distress. He has no wheezes. He has no rales. He exhibits no tenderness.   Abdominal: Soft. Bowel sounds are normal. He exhibits no distension and no mass. There is no tenderness. There is no guarding.   Musculoskeletal: Normal range of motion. He exhibits no edema, tenderness or deformity.   Lymphadenopathy:     He has no cervical adenopathy.   Neurological: He is alert and oriented to person, place, and time. He displays normal reflexes. No cranial nerve deficit or sensory deficit. He exhibits normal muscle tone. Coordination normal.   Skin: Skin is warm and dry. Capillary refill takes less than 2 seconds. No rash noted. He is not diaphoretic. No erythema. No pallor.   Psychiatric: He has a normal mood and affect. His behavior is normal. Judgment and thought content normal.   Nursing note and vitals reviewed.    Significant Labs:   Recent Labs   Lab 03/02/20  1148   HGBA1C 10.3*     Recent Labs   Lab 03/02/20  0652 03/03/20  0604   BILITOT 0.8 0.9     Recent Labs   Lab 03/02/20  0652 03/03/20  0604   WBC 7.34 5.89   HGB 17.4 17.4   HCT 47.0 49.4    264     Recent Labs   Lab 03/02/20  0652 03/03/20  0604   * 135*    K 3.8 4.0    102   CO2 22* 24   * 269*   BUN 9 13   CREATININE 0.8 0.8   CALCIUM 9.5 9.4   PROT 7.4 7.0   ALBUMIN 4.4 4.1   BILITOT 0.8 0.9   ALKPHOS 71 67   AST 24 24   ALT 49* 48*   ANIONGAP 11 9   EGFRNONAA >60 >60     Recent Labs   Lab 03/03/20  0604   INR 1.0   APTT 29.4     Recent Labs   Lab 03/02/20  0652   CHOL 157   HDL 31*   LDLCALC 94.8   TRIG 156*   CHOLHDL 19.7*     Recent Labs   Lab 03/03/20  0604   MG 1.9     Recent Labs   Lab 03/02/20  1742 03/02/20  2105 03/03/20  0451   POCTGLUCOSE 256* 221* 210*     Recent Labs   Lab 03/02/20  0652   TROPONINI <0.006     Recent Labs   Lab 03/02/20  0652   TSH 0.950     Recent Labs   Lab 03/02/20  0709   COLORU Yellow   APPEARANCEUA Clear   PHUR 7.0   SPECGRAV <=1.005*   PROTEINUA Negative   GLUCUA 3+*   KETONESU Negative   BILIRUBINUA Negative   OCCULTUA Negative   NITRITE Negative   UROBILINOGEN Negative   LEUKOCYTESUR Negative   BACTERIA None   SQUAMEPITHEL 1     Significant Imaging:  Imaging Results          CTA Head and Neck (xpd) (Final result)  Result time 03/02/20 09:08:26    Final result by Chaitanya Rea MD (03/02/20 09:08:26)                 Impression:      1. No CT findings to suggest an acute major vascular distribution infarct or intracranial hemorrhage.  2. Unremarkable CTA head and neck specifically without focal stenosis, occlusion or aneurysm.  3. Asymmetric prominence of the right submandibular gland with a few slightly prominent surrounding lymph nodes, a finding of uncertain clinical significance.  Clinical correlation recommended.      Electronically signed by: Chaitanya Rea MD  Date:    03/02/2020  Time:    09:08             Narrative:    EXAMINATION:  CTA HEAD AND NECK (XPD)    CLINICAL HISTORY:  Focal neuro deficit, new, fixed or worsening, <6 hours;    TECHNIQUE:  Non contrast low dose axial images were obtained through the head.  CT angiogram was performed from the level of the jaylin to the top of the head following  the IV administration of 100mL of Omnipaque 350.   Sagittal and coronal reconstructions and maximum intensity projection reconstructions were performed. Arterial stenosis percentages are based on NASCET measurement criteria.    COMPARISON:  CT 03/02/2020.    FINDINGS:  CT head:    No CT findings to suggest an acute major vascular distribution infarct.  No intracranial hemorrhage.  No hydrocephalus.  No midline shift or mass effect.  No abnormal intra or extra-axial fluid collection.  No pathologic enhancement on post-contrast images.    Paranasal sinuses and mastoid air cells are clear.  No acute osseous abnormalities.  Globes are symmetric.    CTA head:    Major intracranial vessels are patent without focal stenosis, occlusion or aneurysm.    CTA neck:    Left-sided aortic arch with 3 branch vessels.  Major vessels of the neck are patent without focal stenosis or occlusion.    CT neck:    Visualized parotid and thyroid glands are normal.  There is asymmetric prominence of the right submandibular gland, nonspecific.  There are a few slightly prominent right level 2A cervical lymph nodes, the largest of which measures 1.2 cm in short axis and is located within the retromandibular region.    Prevertebral soft tissues are normal.    No focal pharyngeal or laryngeal masses.    There is a 1.1 cm irregular solid opacity within the left lung apex, similar to slightly decreased in size when compared to CT dated 09/07/2018.  No pneumothorax.    Subcutaneous soft tissues are within normal limits.    There is a moderate central/left paracentral broad-based disc protrusion at C5-C6 that appears to efface the ventral thecal sac and likely abuts the spinal cord resulting in mild spinal canal stenosis.                               CT Head Without Contrast (Final result)  Result time 03/02/20 07:14:15    Final result by Zuleima Davenport MD (03/02/20 07:14:15)                 Impression:      No CT evidence of acute intracranial  abnormality.  There is clinical concern for acute ischemia, further evaluation with MRI is recommended if there are no clinical contraindications.      Electronically signed by: Zuleima Davenport MD  Date:    03/02/2020  Time:    07:14             Narrative:    EXAMINATION:  CT HEAD WITHOUT CONTRAST    CLINICAL HISTORY:  Dizziness;Stroke;Focal neuro deficit, new, fixed or worsening, <6 hours;    TECHNIQUE:  Low dose axial images were obtained through the head.  Coronal and sagittal reformations were also performed. Contrast was not administered.    COMPARISON:  None.    FINDINGS:  There is no acute intracranial hemorrhage, hydrocephalus, midline shift or mass effect. Gray-white matter differentiation appears maintained. The basal cisterns are patent. The mastoid air cells and paranasal sinuses are clear of acute process. The visualized bones of the calvarium demonstrate no acute osseous abnormality.                               X-Ray Chest AP Portable (Final result)  Result time 03/02/20 06:31:13    Final result by Zuleima Davenport MD (03/02/20 06:31:13)                 Impression:      No acute intrathoracic abnormality identified on this single radiographic view of the chest.      Electronically signed by: Zuleima Davenport MD  Date:    03/02/2020  Time:    06:31             Narrative:    EXAMINATION:  XR CHEST AP PORTABLE    CLINICAL HISTORY:  Chest Pain;    TECHNIQUE:  Single frontal view of the chest was performed.    COMPARISON:  12/26/2018    FINDINGS:  Cardiac monitoring leads overlie the chest.  The cardiomediastinal silhouette is within normal limits.  Visualized airway is unremarkable.  The lungs appear symmetrically aerated without definite focal alveolar consolidation. No large pleural effusion or pneumothorax is appreciated.Visualized osseous structures are intact.

## 2020-03-03 NOTE — NURSING
After 1 liter of normal saline given per order pt still c/o leg and feet cramping.  Provider notified/will come to bedside to se pt.

## 2020-03-03 NOTE — PROGRESS NOTES
Pharmacy New Medication Education    Patient and/or Caregiver ACCEPTED medication education.    Pharmacy has provided education on the name, indication, and possible side effects of the medication(s) prescribed, using teach-back method.     Learners of pharmacy medication education includes:  patient    The following medications have also been discussed, during this admission.     Current Facility-Administered Medications   Medication Frequency    aspirin EC tablet 81 mg Daily    atorvastatin tablet 40 mg Daily    dextrose 50 % in water (D50W) injection 12.5 g PRN    glucagon (human recombinant) injection 1 mg PRN    influenza (QUADRIVALENT PF) vaccine 0.5 mL vaccine x 1 dose    insulin aspart U-100 pen 0-5 Units Q6H PRN    pneumoc 13-nga conj-dip cr(PF) (PREVNAR 13 (PF)) 0.5 mL vaccine x 1 dose    sodium chloride 0.9% flush 10 mL PRN          Thank you  Khoa Darden, PharmD

## 2020-03-03 NOTE — PLAN OF CARE
Principal Problem:TIA (transient ischemic attack)     Chief Complaint:        Chief Complaint   Patient presents with    Headache       To ER with c/o jittery feeling after taking too much cafiene last night.      HPI: 45 yo M with a PMHx include pre-diabetes presents to [facility] ED for evaluation of severe headache since 5AM this morning. Pt reports staying up all night and drinking an excessive amount of caffeine to stay awake while driving. Pt describes headache as severe but gradual in onset over ~30 minutes. Associated symptoms included dizziness and left-arm numbness. Pt denies recent alcohol use and recreational drug use. Reports being compliant with home medications. Per ED staff, pt w/ abnormal neuro exam on presentation - code stroke called and vascular neurology consulted. On reassessment, prior to Family Medicine being called for admission, pt was reassessed and found to have full resolution of symptoms. Pt current w/ no complaints.     The Margaret met with the pt and his s/o Rosy([phone])in the CDU. The pt's independent with all his adl's and doesn't use any dme. The pt's s/o will transport him home at d/c. The Sw wrote her name and contact info on the white board in the pt's room. The Sw gave him a d/c planning brochure and encouraged him to call should he have any further questions or concerns. A Medicaid isma was done on the pt yesterday in the ED. The Margaret     03/03/20 7725   Final Note   Assessment Type Final Discharge Note   Anticipated Discharge Disposition Home   What phone number can be called within the next 1-3 days to see how you are doing after discharge? [phone]   Hospital Follow Up  Appt(s) scheduled? Yes   Discharge plans and expectations educations in teach back method with documentation complete? Yes   Right Care Referral Info   Post Acute Recommendation No Care    will continue to follow the pt and assist with any d/c needs.

## 2020-03-13 ENCOUNTER — TELEPHONE (OUTPATIENT)
Dept: FAMILY MEDICINE | Facility: HOSPITAL | Age: 45
End: 2020-03-13

## 2020-03-13 NOTE — TELEPHONE ENCOUNTER
----- Message from Adele Vigil sent at 3/13/2020  9:12 AM CDT -----  Contact:  pt called [phone]  Pt needs a new meter and strips. Discussed this with  on last visit. Please send to [id number] - [place], [place] - [address] AT [facility]

## 2020-03-16 RX ORDER — INSULIN PUMP SYRINGE, 3 ML
EACH MISCELLANEOUS
Qty: 1 EACH | Refills: 0 | Status: SHIPPED | OUTPATIENT
Start: 2020-03-16 | End: 2020-04-01 | Stop reason: SDUPTHER

## 2020-04-01 RX ORDER — DEXTROSE 4 G
TABLET,CHEWABLE ORAL
Qty: 1 EACH | Refills: 0 | Status: SHIPPED | OUTPATIENT
Start: 2020-04-01 | End: 2023-10-26 | Stop reason: SDUPTHER

## 2020-05-14 ENCOUNTER — OFFICE VISIT (OUTPATIENT)
Dept: FAMILY MEDICINE | Facility: HOSPITAL | Age: 45
End: 2020-05-14
Attending: FAMILY MEDICINE
Payer: MEDICAID

## 2020-05-14 VITALS
SYSTOLIC BLOOD PRESSURE: 143 MMHG | HEART RATE: 86 BPM | WEIGHT: 211 LBS | DIASTOLIC BLOOD PRESSURE: 91 MMHG | BODY MASS INDEX: 31.98 KG/M2 | HEIGHT: 68 IN

## 2020-05-14 DIAGNOSIS — S29.9XXA RIB INJURY: ICD-10-CM

## 2020-05-14 DIAGNOSIS — L60.0 INGROWN NAIL: ICD-10-CM

## 2020-05-14 PROCEDURE — 99214 OFFICE O/P EST MOD 30 MIN: CPT | Performed by: STUDENT IN AN ORGANIZED HEALTH CARE EDUCATION/TRAINING PROGRAM

## 2020-05-14 RX ORDER — MUPIROCIN 20 MG/G
OINTMENT TOPICAL 3 TIMES DAILY
Qty: 15 G | Refills: 1 | Status: SHIPPED | OUTPATIENT
Start: 2020-05-14 | End: 2020-05-19

## 2020-05-15 ENCOUNTER — HOSPITAL ENCOUNTER (OUTPATIENT)
Dept: RADIOLOGY | Facility: HOSPITAL | Age: 45
Discharge: HOME | End: 2020-05-15
Attending: STUDENT IN AN ORGANIZED HEALTH CARE EDUCATION/TRAINING PROGRAM
Payer: MEDICAID

## 2020-05-15 ENCOUNTER — TELEPHONE (OUTPATIENT)
Dept: FAMILY MEDICINE | Facility: HOSPITAL | Age: 45
End: 2020-05-15

## 2020-05-15 DIAGNOSIS — S29.9XXA RIB INJURY: ICD-10-CM

## 2020-05-15 PROCEDURE — 71100 XR RIBS 2 VIEW RIGHT: ICD-10-PCS | Mod: 26,RT,, | Performed by: RADIOLOGY

## 2020-05-15 PROCEDURE — 71100 X-RAY EXAM RIBS UNI 2 VIEWS: CPT | Mod: 26,RT,, | Performed by: RADIOLOGY

## 2020-05-15 PROCEDURE — 71100 X-RAY EXAM RIBS UNI 2 VIEWS: CPT | Mod: TC,FY,RT

## 2020-05-15 NOTE — PROGRESS NOTES
Subjective:       Patient ID: Andrea Fernández is a 45 y.o. male.    Chief Complaint: Back Pain and Diabetes    44 yo with DMII presenting for DM follow up. Patient reports that since his last hospitalization he has been more complaint with his Metformin. He also has cut down on his alcohol use now drinking 12 pack a week which is significantly less than 3-4, 12 pack a week. Patient has also decrease his carb intake. Patient does not check his AM glucose. Patient reports that he no longer has increase urinary frequency.     Patient states that 2 months ago he ran into a 4x4 while working. Patient states that since then he has had a large bump on his right side. It reports that while it's not painful, it is increasing in size.     Patient also is concern with developing a ingrown toenail. He cut his nails with a scissors.     Review of Systems   Constitutional: Negative for activity change, chills, fatigue and fever.   HENT: Negative for rhinorrhea and sore throat.    Respiratory: Negative for cough, choking, shortness of breath and wheezing.    Cardiovascular: Negative for chest pain, palpitations and leg swelling.   Gastrointestinal: Negative for abdominal pain, constipation, diarrhea, nausea and vomiting.   Endocrine: Negative for polydipsia, polyphagia and polyuria.   Genitourinary: Negative for difficulty urinating, frequency, hematuria and urgency.   Musculoskeletal: Negative for back pain and myalgias.   Skin: Negative for color change and rash.   Neurological: Negative for dizziness, weakness, light-headedness and headaches.       Objective:      Vitals:    05/14/20 1648   BP: (!) 143/91   Pulse:      Physical Exam   Constitutional: He is oriented to person, place, and time. He appears well-developed and well-nourished. No distress.   HENT:   Head: Normocephalic and atraumatic.   Mouth/Throat: Oropharynx is clear and moist.   Eyes: Pupils are equal, round, and reactive to light. EOM are normal.   Neck: Normal  range of motion. Neck supple. No JVD present. No thyromegaly present.   Cardiovascular: Normal rate, regular rhythm, normal heart sounds and intact distal pulses. Exam reveals no gallop and no friction rub.   No murmur heard.  Pulmonary/Chest: Effort normal and breath sounds normal. No respiratory distress. He has no wheezes.   Abdominal: Soft. Bowel sounds are normal. He exhibits no distension and no mass. There is no tenderness. There is no rebound.   Musculoskeletal: Normal range of motion. He exhibits no edema or tenderness.   Hard nodule on the right lateral side   Lymphadenopathy:     He has no cervical adenopathy.   Neurological: He is alert and oriented to person, place, and time. No cranial nerve deficit. He exhibits normal muscle tone.   Skin: Skin is warm and dry. Capillary refill takes less than 2 seconds. No rash noted. He is not diaphoretic. No erythema.       Assessment:       1. Uncontrolled type 2 diabetes mellitus without complication, without long-term current use of insulin    2. Ingrown nail    3. Rib injury        Plan:       Uncontrolled type 2 diabetes mellitus without complication, without long-term current use of insulin  -     Hemoglobin A1C; Future; Expected date: 05/14/2020  -     Ambulatory referral/consult to Podiatry; Future; Expected date: 05/21/2020    Ingrown nail  -     Ambulatory referral/consult to Podiatry; Future; Expected date: 05/21/2020        -     mupirocin (BACTROBAN) 2 % ointment; Apply topically 3 (three) times daily. for 5 days  Dispense: 15 g; Refill: 1    Rib injury  -     X-Ray Ribs 2 View Right; Future; Expected date: 05/14/2020      Follow up in about 4 weeks (around 6/11/2020). Patient will check A1C in 1 month.

## 2020-05-15 NOTE — TELEPHONE ENCOUNTER
----- Message from Adele Vigil sent at 5/15/2020 12:27 PM CDT -----  Contact: Pt called [phone]  Pt was suppose to get some pain medication. PT went to pharmacy and there was nothing there. Please call pt back

## 2020-05-15 NOTE — PROGRESS NOTES
I assume primary medical responsibility for this patient. I have reviewed the history, physical, and assessement & treatment plan with the resident and agree that the care is reasonable and necessary. This service has been performed by a resident without the presence of a teaching physician under the primary care exception. If necessary, an addendum of additional findings or evaluation beyond the resident documentation will be noted below.    Chaparrita Cat MD

## 2020-05-16 RX ORDER — DICLOFENAC SODIUM 10 MG/G
2 GEL TOPICAL 4 TIMES DAILY
Qty: 100 G | Refills: 1 | Status: SHIPPED | OUTPATIENT
Start: 2020-05-16 | End: 2023-04-26

## 2021-03-26 ENCOUNTER — IMMUNIZATION (OUTPATIENT)
Dept: PRIMARY CARE CLINIC | Facility: CLINIC | Age: 46
End: 2021-03-26
Payer: MEDICAID

## 2021-03-26 DIAGNOSIS — Z23 NEED FOR VACCINATION: Primary | ICD-10-CM

## 2021-03-26 PROCEDURE — 0001A PR IMMUNIZ ADMIN, SARS-COV-2 COVID-19 VACC, 30MCG/0.3ML, 1ST DOSE: ICD-10-PCS | Mod: CV19,S$GLB,, | Performed by: INTERNAL MEDICINE

## 2021-03-26 PROCEDURE — 91300 PR SARS-COV- 2 COVID-19 VACCINE, NO PRSV, 30MCG/0.3ML, IM: CPT | Mod: S$GLB,,, | Performed by: INTERNAL MEDICINE

## 2021-03-26 PROCEDURE — 91300 PR SARS-COV- 2 COVID-19 VACCINE, NO PRSV, 30MCG/0.3ML, IM: ICD-10-PCS | Mod: S$GLB,,, | Performed by: INTERNAL MEDICINE

## 2021-03-26 PROCEDURE — 0001A PR IMMUNIZ ADMIN, SARS-COV-2 COVID-19 VACC, 30MCG/0.3ML, 1ST DOSE: CPT | Mod: CV19,S$GLB,, | Performed by: INTERNAL MEDICINE

## 2021-03-26 RX ADMIN — Medication 0.3 ML: at 04:03

## 2021-04-05 ENCOUNTER — CLINICAL SUPPORT (OUTPATIENT)
Dept: URGENT CARE | Facility: CLINIC | Age: 46
End: 2021-04-05
Payer: MEDICAID

## 2021-04-05 DIAGNOSIS — Z03.818 ENCNTR FOR OBS FOR SUSP EXPSR TO OTH BIOLG AGENTS RULED OUT: Primary | ICD-10-CM

## 2021-04-05 PROCEDURE — U0005 INFEC AGEN DETEC AMPLI PROBE: HCPCS | Performed by: PHYSICIAN ASSISTANT

## 2021-04-05 PROCEDURE — U0003 INFECTIOUS AGENT DETECTION BY NUCLEIC ACID (DNA OR RNA); SEVERE ACUTE RESPIRATORY SYNDROME CORONAVIRUS 2 (SARS-COV-2) (CORONAVIRUS DISEASE [COVID-19]), AMPLIFIED PROBE TECHNIQUE, MAKING USE OF HIGH THROUGHPUT TECHNOLOGIES AS DESCRIBED BY CMS-2020-01-R: HCPCS | Performed by: PHYSICIAN ASSISTANT

## 2021-04-07 LAB — SARS-COV-2 RNA RESP QL NAA+PROBE: NOT DETECTED

## 2021-04-14 ENCOUNTER — PATIENT MESSAGE (OUTPATIENT)
Dept: FAMILY MEDICINE | Facility: HOSPITAL | Age: 46
End: 2021-04-14

## 2021-04-14 RX ORDER — METFORMIN HYDROCHLORIDE 1000 MG/1
1000 TABLET ORAL 2 TIMES DAILY WITH MEALS
Qty: 180 TABLET | Refills: 0 | Status: SHIPPED | OUTPATIENT
Start: 2021-04-14 | End: 2021-09-03 | Stop reason: SDUPTHER

## 2021-04-23 ENCOUNTER — IMMUNIZATION (OUTPATIENT)
Dept: PRIMARY CARE CLINIC | Facility: CLINIC | Age: 46
End: 2021-04-23
Payer: MEDICAID

## 2021-04-23 DIAGNOSIS — Z23 NEED FOR VACCINATION: Primary | ICD-10-CM

## 2021-04-23 PROCEDURE — 0002A PR IMMUNIZ ADMIN, SARS-COV-2 COVID-19 VACC, 30MCG/0.3ML, 2ND DOSE: ICD-10-PCS | Mod: CV19,S$GLB,, | Performed by: INTERNAL MEDICINE

## 2021-04-23 PROCEDURE — 0002A PR IMMUNIZ ADMIN, SARS-COV-2 COVID-19 VACC, 30MCG/0.3ML, 2ND DOSE: CPT | Mod: CV19,S$GLB,, | Performed by: INTERNAL MEDICINE

## 2021-04-23 PROCEDURE — 91300 PR SARS-COV- 2 COVID-19 VACCINE, NO PRSV, 30MCG/0.3ML, IM: ICD-10-PCS | Mod: S$GLB,,, | Performed by: INTERNAL MEDICINE

## 2021-04-23 PROCEDURE — 91300 PR SARS-COV- 2 COVID-19 VACCINE, NO PRSV, 30MCG/0.3ML, IM: CPT | Mod: S$GLB,,, | Performed by: INTERNAL MEDICINE

## 2021-04-23 RX ADMIN — Medication 0.3 ML: at 11:04

## 2021-08-27 ENCOUNTER — PATIENT MESSAGE (OUTPATIENT)
Dept: FAMILY MEDICINE | Facility: HOSPITAL | Age: 46
End: 2021-08-27

## 2021-08-28 RX ORDER — METFORMIN HYDROCHLORIDE 1000 MG/1
1000 TABLET ORAL 2 TIMES DAILY WITH MEALS
Qty: 180 TABLET | Refills: 0 | Status: SHIPPED | OUTPATIENT
Start: 2021-08-28 | End: 2021-10-04 | Stop reason: SDUPTHER

## 2021-09-16 ENCOUNTER — IMMUNIZATION (OUTPATIENT)
Dept: INTERNAL MEDICINE | Facility: CLINIC | Age: 46
End: 2021-09-16
Payer: MEDICAID

## 2021-09-16 DIAGNOSIS — Z23 NEED FOR VACCINATION: Primary | ICD-10-CM

## 2021-09-16 PROCEDURE — 91300 COVID-19, MRNA, LNP-S, PF, 30 MCG/0.3 ML DOSE VACCINE: ICD-10-PCS | Mod: ,,, | Performed by: INTERNAL MEDICINE

## 2021-09-16 PROCEDURE — 0003A COVID-19, MRNA, LNP-S, PF, 30 MCG/0.3 ML DOSE VACCINE: CPT | Mod: CV19,,, | Performed by: INTERNAL MEDICINE

## 2021-09-16 PROCEDURE — 91300 COVID-19, MRNA, LNP-S, PF, 30 MCG/0.3 ML DOSE VACCINE: CPT | Mod: ,,, | Performed by: INTERNAL MEDICINE

## 2021-09-16 PROCEDURE — 0003A COVID-19, MRNA, LNP-S, PF, 30 MCG/0.3 ML DOSE VACCINE: ICD-10-PCS | Mod: CV19,,, | Performed by: INTERNAL MEDICINE

## 2021-10-04 ENCOUNTER — OFFICE VISIT (OUTPATIENT)
Dept: FAMILY MEDICINE | Facility: HOSPITAL | Age: 46
End: 2021-10-04
Payer: MEDICAID

## 2021-10-04 VITALS
SYSTOLIC BLOOD PRESSURE: 128 MMHG | HEIGHT: 68 IN | HEART RATE: 68 BPM | WEIGHT: 212.75 LBS | DIASTOLIC BLOOD PRESSURE: 72 MMHG | BODY MASS INDEX: 32.24 KG/M2

## 2021-10-04 DIAGNOSIS — E11.65 TYPE 2 DIABETES MELLITUS WITH HYPERGLYCEMIA, WITHOUT LONG-TERM CURRENT USE OF INSULIN: Primary | ICD-10-CM

## 2021-10-04 DIAGNOSIS — Z00.00 PREVENTATIVE HEALTH CARE: ICD-10-CM

## 2021-10-04 DIAGNOSIS — E11.69 DYSLIPIDEMIA ASSOCIATED WITH TYPE 2 DIABETES MELLITUS: ICD-10-CM

## 2021-10-04 DIAGNOSIS — E78.5 DYSLIPIDEMIA ASSOCIATED WITH TYPE 2 DIABETES MELLITUS: ICD-10-CM

## 2021-10-04 DIAGNOSIS — R53.83 FATIGUE, UNSPECIFIED TYPE: ICD-10-CM

## 2021-10-04 DIAGNOSIS — Z13.220 NEED FOR LIPID SCREENING: ICD-10-CM

## 2021-10-04 DIAGNOSIS — E11.42 DIABETIC POLYNEUROPATHY ASSOCIATED WITH TYPE 2 DIABETES MELLITUS: ICD-10-CM

## 2021-10-04 LAB — GLUCOSE SERPL-MCNC: 250 MG/DL (ref 70–110)

## 2021-10-04 PROCEDURE — 99213 OFFICE O/P EST LOW 20 MIN: CPT | Performed by: STUDENT IN AN ORGANIZED HEALTH CARE EDUCATION/TRAINING PROGRAM

## 2021-10-04 PROCEDURE — 82962 GLUCOSE BLOOD TEST: CPT | Performed by: STUDENT IN AN ORGANIZED HEALTH CARE EDUCATION/TRAINING PROGRAM

## 2021-10-04 RX ORDER — ATORVASTATIN CALCIUM 40 MG/1
40 TABLET, FILM COATED ORAL DAILY
Qty: 90 TABLET | Refills: 3 | Status: SHIPPED | OUTPATIENT
Start: 2021-10-04 | End: 2023-06-12

## 2021-10-04 RX ORDER — METFORMIN HYDROCHLORIDE 1000 MG/1
1000 TABLET ORAL 2 TIMES DAILY WITH MEALS
Qty: 180 TABLET | Refills: 0 | Status: SHIPPED | OUTPATIENT
Start: 2021-10-04 | End: 2021-12-10 | Stop reason: SDUPTHER

## 2021-10-05 ENCOUNTER — LAB VISIT (OUTPATIENT)
Dept: LAB | Facility: HOSPITAL | Age: 46
End: 2021-10-05
Attending: STUDENT IN AN ORGANIZED HEALTH CARE EDUCATION/TRAINING PROGRAM
Payer: MEDICAID

## 2021-10-05 DIAGNOSIS — Z00.00 PREVENTATIVE HEALTH CARE: ICD-10-CM

## 2021-10-05 DIAGNOSIS — E11.65 TYPE 2 DIABETES MELLITUS WITH HYPERGLYCEMIA, WITHOUT LONG-TERM CURRENT USE OF INSULIN: ICD-10-CM

## 2021-10-05 DIAGNOSIS — R53.83 FATIGUE, UNSPECIFIED TYPE: ICD-10-CM

## 2021-10-05 DIAGNOSIS — Z13.220 NEED FOR LIPID SCREENING: ICD-10-CM

## 2021-10-05 LAB
ALBUMIN SERPL BCP-MCNC: 4.1 G/DL (ref 3.5–5.2)
ALP SERPL-CCNC: 71 U/L (ref 55–135)
ALT SERPL W/O P-5'-P-CCNC: 38 U/L (ref 10–44)
ANION GAP SERPL CALC-SCNC: 10 MMOL/L (ref 8–16)
AST SERPL-CCNC: 18 U/L (ref 10–40)
BASOPHILS # BLD AUTO: 0.06 K/UL (ref 0–0.2)
BASOPHILS NFR BLD: 0.9 % (ref 0–1.9)
BILIRUB SERPL-MCNC: 0.5 MG/DL (ref 0.1–1)
BUN SERPL-MCNC: 14 MG/DL (ref 6–20)
CALCIUM SERPL-MCNC: 9.4 MG/DL (ref 8.7–10.5)
CHLORIDE SERPL-SCNC: 104 MMOL/L (ref 95–110)
CHOLEST SERPL-MCNC: 146 MG/DL (ref 120–199)
CHOLEST/HDLC SERPL: 5.4 {RATIO} (ref 2–5)
CO2 SERPL-SCNC: 22 MMOL/L (ref 23–29)
CREAT SERPL-MCNC: 0.8 MG/DL (ref 0.5–1.4)
DIFFERENTIAL METHOD: ABNORMAL
EOSINOPHIL # BLD AUTO: 0.3 K/UL (ref 0–0.5)
EOSINOPHIL NFR BLD: 4.5 % (ref 0–8)
ERYTHROCYTE [DISTWIDTH] IN BLOOD BY AUTOMATED COUNT: 11.5 % (ref 11.5–14.5)
EST. GFR  (AFRICAN AMERICAN): >60 ML/MIN/1.73 M^2
EST. GFR  (NON AFRICAN AMERICAN): >60 ML/MIN/1.73 M^2
ESTIMATED AVG GLUCOSE: 163 MG/DL (ref 68–131)
GLUCOSE SERPL-MCNC: 195 MG/DL (ref 70–110)
HBA1C MFR BLD: 7.3 % (ref 4–5.6)
HCT VFR BLD AUTO: 44.2 % (ref 40–54)
HDLC SERPL-MCNC: 27 MG/DL (ref 40–75)
HDLC SERPL: 18.5 % (ref 20–50)
HGB BLD-MCNC: 15.9 G/DL (ref 14–18)
IMM GRANULOCYTES # BLD AUTO: 0.03 K/UL (ref 0–0.04)
IMM GRANULOCYTES NFR BLD AUTO: 0.4 % (ref 0–0.5)
LDLC SERPL CALC-MCNC: 39 MG/DL (ref 63–159)
LYMPHOCYTES # BLD AUTO: 2.6 K/UL (ref 1–4.8)
LYMPHOCYTES NFR BLD: 39 % (ref 18–48)
MCH RBC QN AUTO: 31.9 PG (ref 27–31)
MCHC RBC AUTO-ENTMCNC: 36 G/DL (ref 32–36)
MCV RBC AUTO: 89 FL (ref 82–98)
MONOCYTES # BLD AUTO: 0.5 K/UL (ref 0.3–1)
MONOCYTES NFR BLD: 7 % (ref 4–15)
NEUTROPHILS # BLD AUTO: 3.3 K/UL (ref 1.8–7.7)
NEUTROPHILS NFR BLD: 48.2 % (ref 38–73)
NONHDLC SERPL-MCNC: 119 MG/DL
NRBC BLD-RTO: 0 /100 WBC
PLATELET # BLD AUTO: 249 K/UL (ref 150–450)
PMV BLD AUTO: 9.3 FL (ref 9.2–12.9)
POTASSIUM SERPL-SCNC: 3.9 MMOL/L (ref 3.5–5.1)
PROT SERPL-MCNC: 7.2 G/DL (ref 6–8.4)
RBC # BLD AUTO: 4.99 M/UL (ref 4.6–6.2)
SODIUM SERPL-SCNC: 136 MMOL/L (ref 136–145)
TRIGL SERPL-MCNC: 400 MG/DL (ref 30–150)
WBC # BLD AUTO: 6.74 K/UL (ref 3.9–12.7)

## 2021-10-05 PROCEDURE — 36415 COLL VENOUS BLD VENIPUNCTURE: CPT | Performed by: STUDENT IN AN ORGANIZED HEALTH CARE EDUCATION/TRAINING PROGRAM

## 2021-10-05 PROCEDURE — 83036 HEMOGLOBIN GLYCOSYLATED A1C: CPT | Performed by: STUDENT IN AN ORGANIZED HEALTH CARE EDUCATION/TRAINING PROGRAM

## 2021-10-05 PROCEDURE — 80061 LIPID PANEL: CPT | Performed by: STUDENT IN AN ORGANIZED HEALTH CARE EDUCATION/TRAINING PROGRAM

## 2021-10-05 PROCEDURE — 85025 COMPLETE CBC W/AUTO DIFF WBC: CPT | Performed by: STUDENT IN AN ORGANIZED HEALTH CARE EDUCATION/TRAINING PROGRAM

## 2021-10-05 PROCEDURE — 80053 COMPREHEN METABOLIC PANEL: CPT | Performed by: STUDENT IN AN ORGANIZED HEALTH CARE EDUCATION/TRAINING PROGRAM

## 2021-12-10 ENCOUNTER — PATIENT MESSAGE (OUTPATIENT)
Dept: FAMILY MEDICINE | Facility: HOSPITAL | Age: 46
End: 2021-12-10
Payer: MEDICAID

## 2022-06-23 ENCOUNTER — IMMUNIZATION (OUTPATIENT)
Dept: INTERNAL MEDICINE | Facility: CLINIC | Age: 47
End: 2022-06-23
Payer: MEDICAID

## 2022-06-23 DIAGNOSIS — Z23 NEED FOR VACCINATION: Primary | ICD-10-CM

## 2022-06-23 PROCEDURE — 91305 COVID-19, MRNA, LNP-S, PF, 30 MCG/0.3 ML DOSE VACCINE (PFIZER): CPT | Mod: PBBFAC,PO

## 2022-07-01 ENCOUNTER — HOSPITAL ENCOUNTER (EMERGENCY)
Facility: HOSPITAL | Age: 47
Discharge: HOME | End: 2022-07-02
Attending: EMERGENCY MEDICINE

## 2022-07-01 DIAGNOSIS — R07.9 CHEST PAIN, UNSPECIFIED TYPE: Primary | ICD-10-CM

## 2022-07-01 DIAGNOSIS — R07.9 CHEST PAIN: ICD-10-CM

## 2022-07-01 LAB
ALBUMIN SERPL BCP-MCNC: 4.3 G/DL (ref 3.5–5.2)
ALP SERPL-CCNC: 67 U/L (ref 55–135)
ALT SERPL W/O P-5'-P-CCNC: 59 U/L (ref 10–44)
ANION GAP SERPL CALC-SCNC: 14 MMOL/L (ref 8–16)
AST SERPL-CCNC: 27 U/L (ref 10–40)
BASOPHILS # BLD AUTO: 0.07 K/UL (ref 0–0.2)
BASOPHILS NFR BLD: 0.9 % (ref 0–1.9)
BILIRUB SERPL-MCNC: 0.5 MG/DL (ref 0.1–1)
BUN SERPL-MCNC: 16 MG/DL (ref 6–20)
CALCIUM SERPL-MCNC: 9.7 MG/DL (ref 8.7–10.5)
CHLORIDE SERPL-SCNC: 101 MMOL/L (ref 95–110)
CO2 SERPL-SCNC: 20 MMOL/L (ref 23–29)
CREAT SERPL-MCNC: 0.8 MG/DL (ref 0.5–1.4)
DIFFERENTIAL METHOD: ABNORMAL
EOSINOPHIL # BLD AUTO: 0.2 K/UL (ref 0–0.5)
EOSINOPHIL NFR BLD: 2 % (ref 0–8)
ERYTHROCYTE [DISTWIDTH] IN BLOOD BY AUTOMATED COUNT: 11.3 % (ref 11.5–14.5)
EST. GFR  (AFRICAN AMERICAN): >60 ML/MIN/1.73 M^2
EST. GFR  (NON AFRICAN AMERICAN): >60 ML/MIN/1.73 M^2
GLUCOSE SERPL-MCNC: 244 MG/DL (ref 70–110)
HCT VFR BLD AUTO: 46 % (ref 40–54)
HGB BLD-MCNC: 16.7 G/DL (ref 14–18)
IMM GRANULOCYTES # BLD AUTO: 0.01 K/UL (ref 0–0.04)
IMM GRANULOCYTES NFR BLD AUTO: 0.1 % (ref 0–0.5)
LYMPHOCYTES # BLD AUTO: 3.2 K/UL (ref 1–4.8)
LYMPHOCYTES NFR BLD: 39.2 % (ref 18–48)
MCH RBC QN AUTO: 31.9 PG (ref 27–31)
MCHC RBC AUTO-ENTMCNC: 36.3 G/DL (ref 32–36)
MCV RBC AUTO: 88 FL (ref 82–98)
MONOCYTES # BLD AUTO: 0.5 K/UL (ref 0.3–1)
MONOCYTES NFR BLD: 6 % (ref 4–15)
NEUTROPHILS # BLD AUTO: 4.2 K/UL (ref 1.8–7.7)
NEUTROPHILS NFR BLD: 51.8 % (ref 38–73)
NRBC BLD-RTO: 0 /100 WBC
PLATELET # BLD AUTO: 270 K/UL (ref 150–450)
PMV BLD AUTO: 9.7 FL (ref 9.2–12.9)
POCT GLUCOSE: 186 MG/DL (ref 70–110)
POCT GLUCOSE: 240 MG/DL (ref 70–110)
POTASSIUM SERPL-SCNC: 3.7 MMOL/L (ref 3.5–5.1)
PROT SERPL-MCNC: 7.8 G/DL (ref 6–8.4)
RBC # BLD AUTO: 5.24 M/UL (ref 4.6–6.2)
SODIUM SERPL-SCNC: 135 MMOL/L (ref 136–145)
TROPONIN I SERPL DL<=0.01 NG/ML-MCNC: <0.006 NG/ML (ref 0–0.03)
WBC # BLD AUTO: 8.13 K/UL (ref 3.9–12.7)

## 2022-07-01 PROCEDURE — 85379 FIBRIN DEGRADATION QUANT: CPT | Performed by: EMERGENCY MEDICINE

## 2022-07-01 PROCEDURE — 93010 EKG 12-LEAD: ICD-10-PCS | Mod: ,,, | Performed by: INTERNAL MEDICINE

## 2022-07-01 PROCEDURE — 99284 EMERGENCY DEPT VISIT MOD MDM: CPT | Mod: 25

## 2022-07-01 PROCEDURE — 93005 ELECTROCARDIOGRAM TRACING: CPT

## 2022-07-01 PROCEDURE — 25000003 PHARM REV CODE 250: Performed by: EMERGENCY MEDICINE

## 2022-07-01 PROCEDURE — 96361 HYDRATE IV INFUSION ADD-ON: CPT

## 2022-07-01 PROCEDURE — 63600175 PHARM REV CODE 636 W HCPCS: Performed by: EMERGENCY MEDICINE

## 2022-07-01 PROCEDURE — 82962 GLUCOSE BLOOD TEST: CPT

## 2022-07-01 PROCEDURE — 85025 COMPLETE CBC W/AUTO DIFF WBC: CPT | Performed by: EMERGENCY MEDICINE

## 2022-07-01 PROCEDURE — 96374 THER/PROPH/DIAG INJ IV PUSH: CPT

## 2022-07-01 PROCEDURE — 84484 ASSAY OF TROPONIN QUANT: CPT | Performed by: EMERGENCY MEDICINE

## 2022-07-01 PROCEDURE — 93010 ELECTROCARDIOGRAM REPORT: CPT | Mod: ,,, | Performed by: INTERNAL MEDICINE

## 2022-07-01 PROCEDURE — 80053 COMPREHEN METABOLIC PANEL: CPT | Performed by: EMERGENCY MEDICINE

## 2022-07-01 RX ORDER — ASPIRIN 325 MG
325 TABLET ORAL
Status: COMPLETED | OUTPATIENT
Start: 2022-07-01 | End: 2022-07-01

## 2022-07-01 RX ORDER — METFORMIN HYDROCHLORIDE 500 MG/1
1000 TABLET ORAL ONCE
Status: COMPLETED | OUTPATIENT
Start: 2022-07-01 | End: 2022-07-01

## 2022-07-01 RX ORDER — KETOROLAC TROMETHAMINE 30 MG/ML
15 INJECTION, SOLUTION INTRAMUSCULAR; INTRAVENOUS
Status: COMPLETED | OUTPATIENT
Start: 2022-07-01 | End: 2022-07-01

## 2022-07-01 RX ADMIN — SODIUM CHLORIDE 1000 ML: 0.9 INJECTION, SOLUTION INTRAVENOUS at 09:07

## 2022-07-01 RX ADMIN — METFORMIN HYDROCHLORIDE 1000 MG: 500 TABLET ORAL at 09:07

## 2022-07-01 RX ADMIN — ASPIRIN 325 MG ORAL TABLET 325 MG: 325 PILL ORAL at 08:07

## 2022-07-01 RX ADMIN — KETOROLAC TROMETHAMINE 15 MG: 30 INJECTION, SOLUTION INTRAMUSCULAR at 09:07

## 2022-07-02 VITALS
BODY MASS INDEX: 31.56 KG/M2 | WEIGHT: 207.56 LBS | SYSTOLIC BLOOD PRESSURE: 128 MMHG | TEMPERATURE: 98 F | RESPIRATION RATE: 17 BRPM | DIASTOLIC BLOOD PRESSURE: 79 MMHG | HEART RATE: 64 BPM | OXYGEN SATURATION: 98 %

## 2022-07-02 LAB
D DIMER PPP IA.FEU-MCNC: <0.19 MG/L FEU
TROPONIN I SERPL DL<=0.01 NG/ML-MCNC: <0.006 NG/ML (ref 0–0.03)

## 2022-07-02 RX ORDER — METFORMIN HYDROCHLORIDE 1000 MG/1
1000 TABLET ORAL 2 TIMES DAILY
Qty: 60 TABLET | Refills: 0 | Status: SHIPPED | OUTPATIENT
Start: 2022-07-02 | End: 2022-08-01
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
## 2022-07-05 ENCOUNTER — TELEPHONE (OUTPATIENT)
Dept: ADMINISTRATIVE | Facility: OTHER | Age: 47
End: 2022-07-05
Payer: MEDICAID

## 2022-10-13 NOTE — HPI
[address] [facility], MD, FACP, Cite Danni Chapman, [place]      Kaylee MIKAL Dyer, [facility]   Juan Alves, [facility]   Richard Nazario, ASAD Ribera, JUDE-FRANKLIN Rowan, [facility]       Butch Corrales [facility] 136    at [address], [address] Annabelle Schneider  22.    [phone]    FAX: [address] [place] [address], [address], [address]    [phone]    FAX: [phone] CLINICAL RESEARCH NOTE    Argentina Perez is a 45 y.o. 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 The patient was seen today for initial outpatient evaluation in follow-up to recent hospitalization. He has been enrolled in the Durect clinical trial and we are following him on a weekly basis. He is taking lactulose now every other day as he had been having 10+ BM s daily. No n/v. Appetite is strong. No tremor or sleep disturbances. No confusion or recall issues. He reports that he has had no alcohol intake since hospitalization. He has continued to have epigastric pain and has taken tramadol intermittently but not consistently. The medical history was reviewed. Today's visit was conducted per the study protocol. A physical examination was performed. Patient is deeply icteric. Abdomen:  he has massive hepatomegaly, prominent Left lobe of liver, firm. No spleen tip palpable. No ascites and no edema. We have discussed at length establishing with counseling or alcohol rehab/support group as a critical component of his recovery process. I have advised him that this would be a required component if he was determined to need transplant services in the future.       We will continue to monitor carefully and plan on weekly follow-up per Mr. Fernández is a 43 year old male with past medical history of newly diagnosed DMII, who presents with persistent cough, shortness of breath and chest pain since 7/31 when he went to the ED for the same problem and was treated for pneumonia.  Since then he has had persistent shortness of breath, cough, chest pain, sputum production (brown and blood tinged), and weight loss of 15 pounds.  When he presented to the ED he underwent a CT thorax which revealed a 3.3x2.9cm cavitary lesion in the RUL.  He denies travel outside of the [place], exposure to known TB patients, incarceration, homelessness, or other sick contacts.       Hospital/ICU Course:   He was admitted to the floor and subsequent workup for TB was initiated.  He was started on broad spectrum abx.  He was given TB skin test by primary team, quantiferon gold ordered, and AFB smears ordered x 3.  No leukocytosis, mild elevation of ESR (37) and CRP (21), hyperglycemia with elevated A1C.  Patient with minimal symptoms at present - no fevers or chills  - some cough but no significant sputum production      protocol. Patient has requested clearance for work. We are concerned about low blood sodium and too early of a return. Will obtain local electrolytes and plan on 1/2 time return with light duty next week and reassessment on Wednesday 10/19. All symptoms reported by the patient and the findings of the physical examination were recorded in the clinical trial documents.       Thai Myers PA-C  [facility] 59, [address] [facility] 22.  [phone]  [address]

## 2022-12-15 ENCOUNTER — OFFICE VISIT (OUTPATIENT)
Dept: PODIATRY | Facility: CLINIC | Age: 47
End: 2022-12-15
Payer: MEDICAID

## 2022-12-15 VITALS
HEIGHT: 68 IN | BODY MASS INDEX: 30.78 KG/M2 | WEIGHT: 203.06 LBS | HEART RATE: 65 BPM | SYSTOLIC BLOOD PRESSURE: 107 MMHG | DIASTOLIC BLOOD PRESSURE: 70 MMHG | RESPIRATION RATE: 18 BRPM

## 2022-12-15 DIAGNOSIS — E11.65 TYPE 2 DIABETES MELLITUS WITH HYPERGLYCEMIA, UNSPECIFIED WHETHER LONG TERM INSULIN USE: Primary | ICD-10-CM

## 2022-12-15 DIAGNOSIS — M72.2 PLANTAR FASCIITIS, BILATERAL: ICD-10-CM

## 2022-12-15 DIAGNOSIS — M21.612 BILATERAL BUNIONS: ICD-10-CM

## 2022-12-15 DIAGNOSIS — M21.862 ACQUIRED POSTERIOR EQUINUS OF BOTH LOWER EXTREMITIES: ICD-10-CM

## 2022-12-15 DIAGNOSIS — M21.611 BILATERAL BUNIONS: ICD-10-CM

## 2022-12-15 DIAGNOSIS — M21.861 ACQUIRED POSTERIOR EQUINUS OF BOTH LOWER EXTREMITIES: ICD-10-CM

## 2022-12-15 PROCEDURE — 3008F BODY MASS INDEX DOCD: CPT | Mod: CPTII,,, | Performed by: PODIATRIST

## 2022-12-15 PROCEDURE — 1159F MED LIST DOCD IN RCRD: CPT | Mod: CPTII,,, | Performed by: PODIATRIST

## 2022-12-15 PROCEDURE — 99214 OFFICE O/P EST MOD 30 MIN: CPT | Mod: PBBFAC,PO | Performed by: PODIATRIST

## 2022-12-15 PROCEDURE — 99999 PR PBB SHADOW E&M-EST. PATIENT-LVL IV: CPT | Mod: PBBFAC,,, | Performed by: PODIATRIST

## 2022-12-15 PROCEDURE — 99203 OFFICE O/P NEW LOW 30 MIN: CPT | Mod: S$PBB,,, | Performed by: PODIATRIST

## 2022-12-15 PROCEDURE — 1160F PR REVIEW ALL MEDS BY PRESCRIBER/CLIN PHARMACIST DOCUMENTED: ICD-10-PCS | Mod: CPTII,,, | Performed by: PODIATRIST

## 2022-12-15 PROCEDURE — 3074F PR MOST RECENT SYSTOLIC BLOOD PRESSURE < 130 MM HG: ICD-10-PCS | Mod: CPTII,,, | Performed by: PODIATRIST

## 2022-12-15 PROCEDURE — 99203 PR OFFICE/OUTPT VISIT, NEW, LEVL III, 30-44 MIN: ICD-10-PCS | Mod: S$PBB,,, | Performed by: PODIATRIST

## 2022-12-15 PROCEDURE — 3008F PR BODY MASS INDEX (BMI) DOCUMENTED: ICD-10-PCS | Mod: CPTII,,, | Performed by: PODIATRIST

## 2022-12-15 PROCEDURE — 99999 PR PBB SHADOW E&M-EST. PATIENT-LVL IV: ICD-10-PCS | Mod: PBBFAC,,, | Performed by: PODIATRIST

## 2022-12-15 PROCEDURE — 1160F RVW MEDS BY RX/DR IN RCRD: CPT | Mod: CPTII,,, | Performed by: PODIATRIST

## 2022-12-15 PROCEDURE — 3078F DIAST BP <80 MM HG: CPT | Mod: CPTII,,, | Performed by: PODIATRIST

## 2022-12-15 PROCEDURE — 1159F PR MEDICATION LIST DOCUMENTED IN MEDICAL RECORD: ICD-10-PCS | Mod: CPTII,,, | Performed by: PODIATRIST

## 2022-12-15 PROCEDURE — 3074F SYST BP LT 130 MM HG: CPT | Mod: CPTII,,, | Performed by: PODIATRIST

## 2022-12-15 PROCEDURE — 3078F PR MOST RECENT DIASTOLIC BLOOD PRESSURE < 80 MM HG: ICD-10-PCS | Mod: CPTII,,, | Performed by: PODIATRIST

## 2022-12-15 RX ORDER — MELOXICAM 7.5 MG/1
7.5 TABLET ORAL DAILY
Qty: 30 TABLET | Refills: 0 | Status: SHIPPED | OUTPATIENT
Start: 2022-12-15 | End: 2023-01-11

## 2022-12-15 NOTE — PATIENT INSTRUCTIONS
Diabetes: Inspecting Your Feet      Diabetes increases your chances of developing foot problems. So inspect your feet every day. This helps you find small skin irritations before they become serious ulcers or infections. If you have trouble seeing the bottoms of your feet, use a mirror or ask a family member or friend to help.  How to check your feet  Below are tips to help you look for foot problems. Try to check your feet at the same time each day, such as when you get out of bed in the morning:  Check the top of each foot. The tops of toes, back of the heel, and outer edge of the foot can get a lot of rubbing from poor-fitting shoes.  Check the bottom of each foot. Daily wear and tear often leads to problems at pressure spots.  Check the toes and nails. Fungal infections often occur between toes. Toenail problems can also be a sign of fungal infections or lead to breaks in the skin.  Check your shoes, too. Loose objects inside a shoe can injure the foot. Use your hand to feel inside your shoes for things like lashae, loose stitching, or rough areas that could irritate your skin.  Warning signs  Look for any color changes in the foot. Redness with streaks can signal a severe infection, which needs immediate medical attention. Tell your healthcare provider right away if you have any of these problems:  Swelling, sometimes with color changes, may be a sign of poor blood flow or infection. Symptoms include tenderness and an increase in the size of your foot.  Warm or hot areas on your feet may be signs of infection. A foot that is cold may not be getting enough blood.  Sensations such as burning, tingling, or pins and needles can be signs of a problem. Also check for areas that may be numb.  Hot spots are caused by friction or pressure. Look for hot spots in areas that get a lot of rubbing. Hot spots can turn into blisters, calluses, or sores.  Cracks and sores are caused by dry or irritated skin. They are a sign  that the skin is breaking down, which can lead to infection.  Toenail problems to watch for include nails growing into the skin (ingrown toenail) and causing redness or pain. Thick, yellow, or discolored nails can signal a fungal infection.  Drainage and odor can develop from untreated sores and ulcers. Call your healthcare provider right away if you notice white or yellow drainage, bleeding, or unpleasant odor.   Date Last Reviewed: 6/1/2016 © 2000-2017 [employer]. [address] [place] [id number]. All rights reserved. This information is not intended as a substitute for professional medical care. Always follow your healthcare professional's instructions.    Long-Term Complications of Diabetes    Diabetes can cause health problems over time. These are called complications. They are more likely to happen if your blood sugar is often too high. Over time, high blood sugar can damage blood vessels in your body. It is important to keep your blood sugar in your target range. This can help prevent or delay complications from diabetes.  Possible complications  Complications of diabetes include:    Eye problems, including damage to the blood vessels in the eyes (retinopathy), pressure in the eye (glaucoma), and clouding of the eye's lens (a cataract). Eye problems can eventually lead to irreversible blindness.   Tooth and gum problems (periodontal disease), causing loss of teeth and bone  Blood vessel (vascular) disease leading to circulation problems, heart attack or stroke, or a need for amputation of a limb   Problems with sexual function leading to erectile dysfunction in men and sexual discomfort in women   Kidney disease (nephropathy) can eventually lead to kidney failure, which may require dialysis or kidney transplant   Nerve problems (neuropathy), causing pain or loss of feeling in your feet and other parts of your body, potentially leading to an amputation of a limb   High blood pressure  (hypertension), putting strain on your heart and blood vessels  Serious infections, possibly leading to loss of toes, feet, or limbs    How to avoid complications  The serious consequences of these complications may be avoidable for most people with diabetes by managing your blood glucose, blood pressure, and cholesterol levels. This can help you feel better and stay healthy. You can manage diabetes by tracking your blood sugar. You can also eat healthy and exercise to avoid gaining weight. And you should take medicine if directed by your healthcare provider.      Diabetes Foot Care Guidelines  Diabetic foot care is essential as diabetes can be dangerous to your feet--even a small cut can produce serious consequences. Diabetes may cause nerve damage that takes away the feeling in your feet. Diabetes may also reduce blood flow to the feet, making it harder to heal an injury or resist infection. Because of these problems, you may not notice a foreign object in your shoe. As a result, you could develop a blister or a sore. This could lead to an infection or a nonhealing wound that could put you at risk for an amputation.    To avoid serious foot problems that could result in losing a toe, foot or leg, follow these guidelines.    Inspect your feet daily. Check for cuts, blisters, redness, swelling or nail problems. Use a magnifying hand mirror to look at the bottom of your feet. Call your doctor if you notice anything.    Bathe feet in lukewarm, never hot, water. Keep your feet clean by washing them daily. Use only lukewarm water--the temperature you would use on a  baby.    Be gentle when bathing your feet. Wash them using a soft washcloth or sponge. Dry by blotting or patting and carefully dry between the toes.    Moisturize your feet but not between your toes. Use a moisturizer daily to keep dry skin from itching or cracking. But don't moisturize between the toes--that could encourage a fungal  infection.    Cut nails carefully. Cut them straight across and file the edges. Dont cut nails too short, as this could lead to ingrown toenails. If you have concerns about your nails, consult your doctor.    Never treat corns or calluses yourself. No bathroom surgery or medicated pads. Visit your doctor for appropriate treatment.    Wear clean, dry socks. Change them daily.    Consider socks made specifically for patients living with diabetes. These socks have extra cushioning, do not have elastic tops, are higher than the ankle and are made from fibers that wick moisture away from the skin.    Wear socks to bed. If your feet get cold at night, wear socks. Never use a heating pad or a hot water bottle.    Shake out your shoes and feel the inside before wearing. Remember, your feet may not be able to feel a pebble or other foreign object, so always inspect your shoes before putting them on.    Keep your feet warm and dry. Dont let your feet get wet in snow or rain. Wear warm socks and shoes in winter.    Consider using an antiperspirant on the soles of your feet. This is helpful if you have excessive sweating of the feet.    Never walk barefoot. Not even at home! Always wear shoes or slippers. You could step on something and get a scratch or cut.    Take care of your diabetes. Keep your blood sugar levels under control.    Do not smoke. Smoking restricts blood flow in your feet.    Get periodic foot exams. Seeing your foot and ankle surgeon on a regular basis can help prevent the foot complications of diabetes.     Obesity and Your Feet    Obesity is an ever-increasing problem in [place] society. Currently, up to one third of the [place] population is considered obese, defined as a body mass index greater than 30. Although it seems obvious, many studies have found a direct link between increased BMI and foot problems. Not only is there an increased risk of wear-and-tear problems (such as arthritis, tendonitis and  heel pain), but also an increased risk of developing type II diabetes. As little as one pound above your ideal weight can increase pressure in your hips, knees and ankles by as much as eight pounds. Simply walking up a flight of stairs or up an incline can increase pressure on the ankle by four to six times. Weight control can be an essential component to alleviate foot pain.    Your foot and ankle surgeon can be your biggest advocate in losing weight. A surgical procedure, such as gastric banding or gastric bypass, may be desired to help obesity. Often, the surgeon will require a large amount of weight loss (40 to 100+ lbs) before surgery is performed. In addition to diet modification, exercise, such as walking, is encouraged. This can be difficult with foot pain. A foot and ankle surgeon can advise on shoe selection, stretching and even orthotics to keep you walking and help you reach your goals.    Eating the Right Number of Calories (5217-8594 Guidelines)  Calories are a measure of the energy you get from food. If you eat more calories than you use, you will gain weight. If you eat fewer calories than you use, you will lose weight. Below are tables that give the number of calories needed each day. Look for your gender, age, and activity level. If you stick to this number, you should neither gain nor lose weight. Note that this is an estimated number of calories.* Your exact number may differ.  Women  Age in years Low activity level (calories/day) Moderate activity level (calories/day) High activity level (calories/day)   19 to 30 1,800-2,000 2,000-2,200 2,400   31 to 50 1,800 2,000 2,200   51 and older 1,600 1,800 2,000-2,200      Men  Age in years Low activity level  (calories/day) Moderate activity level (calories/day) High activity level (calories/day)   19 to 30 2,400-2,600 2,600-2,800 3,000   31 to 50 2,200-2,400 2,400-2,600 2,800-3,000   51 and older 2,000-2,200 2,200-2,400 2,400-2,800   Activity levels  defined  Low. Only light physical activity such as that done during typical daily life.  Moderate. Light physical activity done during typical daily life AND physical activity equal to walking about 1.5 to 3 miles a day at 3 to 4 miles per hour.  High. Light physical activity done during typical daily life AND physical activity equal to walking more than 3 miles a day at 3 to 4 miles per hour.
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
## 2022-12-23 ENCOUNTER — TELEPHONE (OUTPATIENT)
Dept: PODIATRY | Facility: CLINIC | Age: 47
End: 2022-12-23
Payer: MEDICAID

## 2023-01-26 ENCOUNTER — OFFICE VISIT (OUTPATIENT)
Dept: PODIATRY | Facility: CLINIC | Age: 48
End: 2023-01-26
Payer: MEDICAID

## 2023-01-26 VITALS
WEIGHT: 216.06 LBS | HEART RATE: 70 BPM | BODY MASS INDEX: 32.74 KG/M2 | DIASTOLIC BLOOD PRESSURE: 66 MMHG | HEIGHT: 68 IN | SYSTOLIC BLOOD PRESSURE: 106 MMHG | RESPIRATION RATE: 18 BRPM

## 2023-01-26 DIAGNOSIS — M21.862 ACQUIRED POSTERIOR EQUINUS OF BOTH LOWER EXTREMITIES: ICD-10-CM

## 2023-01-26 DIAGNOSIS — M21.611 BILATERAL BUNIONS: ICD-10-CM

## 2023-01-26 DIAGNOSIS — M72.2 PLANTAR FASCIITIS, BILATERAL: Primary | ICD-10-CM

## 2023-01-26 DIAGNOSIS — M21.612 BILATERAL BUNIONS: ICD-10-CM

## 2023-01-26 DIAGNOSIS — M21.861 ACQUIRED POSTERIOR EQUINUS OF BOTH LOWER EXTREMITIES: ICD-10-CM

## 2023-01-26 PROCEDURE — 3078F PR MOST RECENT DIASTOLIC BLOOD PRESSURE < 80 MM HG: ICD-10-PCS | Mod: CPTII,,, | Performed by: PODIATRIST

## 2023-01-26 PROCEDURE — 99999 PR PBB SHADOW E&M-EST. PATIENT-LVL III: ICD-10-PCS | Mod: PBBFAC,,, | Performed by: PODIATRIST

## 2023-01-26 PROCEDURE — 3078F DIAST BP <80 MM HG: CPT | Mod: CPTII,,, | Performed by: PODIATRIST

## 2023-01-26 PROCEDURE — 99999 PR PBB SHADOW E&M-EST. PATIENT-LVL III: CPT | Mod: PBBFAC,,, | Performed by: PODIATRIST

## 2023-01-26 PROCEDURE — 1159F MED LIST DOCD IN RCRD: CPT | Mod: CPTII,,, | Performed by: PODIATRIST

## 2023-01-26 PROCEDURE — 3008F PR BODY MASS INDEX (BMI) DOCUMENTED: ICD-10-PCS | Mod: CPTII,,, | Performed by: PODIATRIST

## 2023-01-26 PROCEDURE — 3008F BODY MASS INDEX DOCD: CPT | Mod: CPTII,,, | Performed by: PODIATRIST

## 2023-01-26 PROCEDURE — 99213 OFFICE O/P EST LOW 20 MIN: CPT | Mod: PBBFAC,PO | Performed by: PODIATRIST

## 2023-01-26 PROCEDURE — 99213 PR OFFICE/OUTPT VISIT, EST, LEVL III, 20-29 MIN: ICD-10-PCS | Mod: S$PBB,,, | Performed by: PODIATRIST

## 2023-01-26 PROCEDURE — 99213 OFFICE O/P EST LOW 20 MIN: CPT | Mod: S$PBB,,, | Performed by: PODIATRIST

## 2023-01-26 PROCEDURE — 3074F PR MOST RECENT SYSTOLIC BLOOD PRESSURE < 130 MM HG: ICD-10-PCS | Mod: CPTII,,, | Performed by: PODIATRIST

## 2023-01-26 PROCEDURE — 1159F PR MEDICATION LIST DOCUMENTED IN MEDICAL RECORD: ICD-10-PCS | Mod: CPTII,,, | Performed by: PODIATRIST

## 2023-01-26 PROCEDURE — 1160F PR REVIEW ALL MEDS BY PRESCRIBER/CLIN PHARMACIST DOCUMENTED: ICD-10-PCS | Mod: CPTII,,, | Performed by: PODIATRIST

## 2023-01-26 PROCEDURE — 3074F SYST BP LT 130 MM HG: CPT | Mod: CPTII,,, | Performed by: PODIATRIST

## 2023-01-26 PROCEDURE — 1160F RVW MEDS BY RX/DR IN RCRD: CPT | Mod: CPTII,,, | Performed by: PODIATRIST

## 2023-01-26 RX ORDER — DEXAMETHASONE SODIUM PHOSPHATE 4 MG/ML
4 INJECTION, SOLUTION INTRA-ARTICULAR; INTRALESIONAL; INTRAMUSCULAR; INTRAVENOUS; SOFT TISSUE
Status: DISCONTINUED | OUTPATIENT
Start: 2023-01-26 | End: 2023-01-26 | Stop reason: HOSPADM

## 2023-01-26 RX ADMIN — DEXAMETHASONE SODIUM PHOSPHATE 4 MG: 4 INJECTION, SOLUTION INTRAMUSCULAR; INTRAVENOUS at 10:01

## 2023-01-26 NOTE — PATIENT INSTRUCTIONS
Heel Pain (Plantar Fasciitis)        Heel pain is most often caused by plantar fasciitis, a condition that is sometimes also called heel spur syndrome when a spur is present. Heel pain may also be due to other causes, such as a stress fracture, tendonitis, arthritis, nerve irritation or, rarely, a cyst.    Because there are several potential causes, it is important to have heel pain properly diagnosed. A foot and ankle surgeon is able to distinguish between all the possibilities and to determine the underlying source of your heel pain.    What Is Plantar Fasciitis?  Heel pain is often caused by plantar fasciitis  Plantar fasciitis is an inflammation of the band of tissue (the plantar fascia) that extends from the heel to the toes. In this condition, the fascia first becomes irritated and then inflamed, resulting in heel pain.    Causes  The most common cause of plantar fasciitis relates to faulty structure of the foot. For example, people who have problems with their arches, either overly flat feet or high-arched feet, are more prone to developing plantar fasciitis.    Wearing nonsupportive footwear on hard, flat surfaces puts abnormal strain on the plantar fascia and can also lead to plantar fasciitis. This is particularly evident when ones job requires long hours on the feet. Obesity and overuse may also contribute to plantar fasciitis.    Symptoms  The symptoms of plantar fasciitis are:    Pain on the bottom of the heel  Pain in the arch of the foot  Pain that is usually worse upon arising  Pain that increases over a period of months  Swelling on the bottom of the heel     People with plantar fasciitis often describe the pain as worse when they get up in the morning or after they have been sitting for long periods of time. After a few minutes of walking, the pain decreases because walking stretches the fascia. For some people, the pain subsides but returns after spending long periods of time on their  feet.    Diagnosis  To arrive at a diagnosis, the foot and ankle surgeon will obtain your medical history and examine your foot. Throughout this process, the surgeon rules out all possible causes for your heel pain other than plantar fasciitis.    In addition, diagnostic imaging studies, such as x-rays or other imaging modalities, may be used to distinguish the different types of heel pain. Sometimes heel spurs are found in patients with plantar fasciitis, but these are rarely a source of pain. When they are present, the condition may be diagnosed as plantar fasciitis/heel spur syndrome.    Nonsurgical Treatment  Treatment of plantar fasciitis begins with first-line strategies, which you can begin at home:    -Stretching exercises. Exercises that stretch out the calf muscles help ease pain and assist with recovery.  -Avoid going barefoot. When you walk without shoes, you put undue strain and stress on your plantar fascia.  -Ice. Putting an ice pack on your heel for 20 minutes several times a day helps reduce inflammation. Place a thin towel between the ice and your heel; do not apply ice directly to the skin.  -Limit activities. Cut down on extended physical activities to give your heel a rest.  -Shoe modifications. Wearing supportive shoes that have good arch support and a slightly raised heel reduces stress on the plantar fascia.  -Medications. Oral nonsteroidal anti-inflammatory drugs (NSAIDs), such as ibuprofen, may be recommended to reduce pain and inflammation.     If you still have pain after several weeks, see your foot and ankle surgeon, who may add one or more of these treatment approaches:    -Padding, taping and strapping. Placing pads in the shoe softens the impact of walking. Taping and strapping help support the foot and reduce strain on the fascia.  -Orthotic devices. Custom orthotic devices that fit into your shoe help correct the underlying structural abnormalities causing the plantar  fasciitis.  -Injection therapy. In some cases, corticosteroid injections are used to help reduce the inflammation and relieve pain.  -Removable walking cast. A removable walking cast may be used to keep your foot immobile for a few weeks to allow it to rest and heal.  -Night splint. Wearing a night splint allows you to maintain an extended stretch of the plantar fascia while sleeping. This may help reduce the morning pain experienced by some patients.  -Physical therapy. Exercises and other physical therapy measures may be used to help provide relief.     When Is Surgery Needed?  Although most patients with plantar fasciitis respond to nonsurgical treatment, a small percentage of patients may require surgery. If, after several months of nonsurgical treatment, you continue to have heel pain, surgery will be considered. Your foot and ankle surgeon will discuss the surgical options with you and determine which approach would be most beneficial for you.    Long-Term Care  No matter what kind of treatment you undergo for plantar fasciitis, the underlying causes that led to this condition may remain. Therefore, you will need to continue with preventive measures. Wearing supportive shoes, stretching and using custom orthotic devices are the mainstay of long-term treatment for plantar fasciitis.            Understanding Heel Pain  Your heel is the back part of your foot. A band of tissue called the plantar fascia connects the heel bone to the bones in the ball of your foot. Nerves run from the heel up the inside of your ankle and into your leg. When you feel pain in the bottom of your heel, the plantar fascia may be inflamed. Overuse, Achilles tightness, or excess body weight can cause the tissue to tear or pull away from the bone. Sometimes the inflamed plantar fascia also irritates a nerve, causing more pain.    What causes heel pain?  Wearing shoes with poor cushioning can irritate the tissue in your heel (plantar  fascia). Being overweight or standing for long periods can also irritate the tissue. Running, walking, tennis, and other sports that put stress on the heels can cause tiny tears in the tissue. If your lower leg muscles are tight, this is more likely to occur. A tight Achilles tendon will also contribute to heel pain.  Symptoms  You may feel pain on the bottom or on the inside edge of your heel. The pain may be sharp when you get out of bed or when you stand up after sitting for a while. You may feel a dull ache in your heel after youve been standing for a long time on a hard surface. Running can also cause a dull ache.  Preventing future problems  To prevent future heel pain, wear shoes with well-cushioned heels. And do exercises prescribed by your healthcare provider to stretch the plantar fascia and the muscles in the lower leg.   Date Last Reviewed: 9/10/2015  © 1083-0848 [employer]. [address] [place] [address]. All rights reserved. This information is not intended as a substitute for professional medical care. Always follow your healthcare professional's instructions.      Treating Plantar Fasciitis  First, your healthcare provider tries to determine the cause of your problem in order to suggest ways to relieve pain. If your pain is due to poor foot mechanics, custom-made shoe inserts (orthoses) may help.    Reduce symptoms  To relieve mild symptoms, try aspirin, ibuprofen, or other medicines as directed. Rubbing ice on the affected area may also help.  To reduce severe pain and swelling, your healthcare provider may prescribe pills or injections or a walking cast in some instances. Physical therapy, such as ultrasound or a daily stretching program, may also be recommended. Surgery is rarely required.  To reduce symptoms caused by poor foot mechanics, your foot may be taped. This supports the arch and temporarily controls movement. Night splints may also help by stretching the  fascia.  Control movement  If taping helps, your healthcare provider may prescribe orthoses. Built from plaster casts of your feet, these inserts control the way your foot moves. As a result, your symptoms should go away.  Reduce overuse  Every time your foot strikes the ground, the plantar fascia is stretched. You can reduce the strain on the plantar fascia and the possibility of overuse by following these suggestions:  Lose any excess weight.  Avoid running on hard or uneven ground.  Use orthoses at all times in your shoes and house slippers.  If surgery is needed  Your healthcare provider may consider surgery if other types of treatment don't control your pain. During surgery, the plantar fascia is partially cut to release tension. As you heal, fibrous tissue fills the space between the heel bone and the plantar fascia.   Date Last Reviewed: 10/14/2015  © 5248-6773 [employer]. [address] [place] [address]. All rights reserved. This information is not intended as a substitute for professional medical care. Always follow your healthcare professional's instructions.      Equinus          What Is Equinus?    Equinus is a condition in which the upward bending motion of the ankle joint is limited. Someone with equinus lacks the flexibility to bring the top of the foot toward the front of the leg. Equinus can occur in one or both feet. When it involves both feet, the limitation of motion is sometimes worse in one foot than in the other.    People with equinus develop ways to compensate for their limited ankle motion, and this often leads to other foot, leg or back problems. The most common methods of compensation are flattening of the arch or picking up the heel early when walking, placing increased pressure on the ball of the foot. Other patients compensate by toe walking, while a smaller number take steps by bending abnormally at the hip or knee.    Causes  There are several possible causes  for the limited range of ankle motion. Often, it is due to tightness in the Achilles tendon or calf muscles (the soleus muscle and/or gastrocnemius muscle). In some patients, this tightness is congenital (present at birth), and sometimes it is an inherited trait. Other patients acquire the tightness from being in a cast, being on crutches or frequently wearing high-heeled shoes. In addition, diabetes can affect the fibers of the Achilles tendon and cause tightness. Sometimes equinus is related to a bone blocking the ankle motion. For example, a fragment of a broken bone following an ankle injury, or bone block, can get in the way and restrict motion. Equinus may also result from one leg being shorter than the other. Less often, equinus is caused by spasms in the calf muscle. These spasms may be signs of an underlying neurologic disorder.      Foot Problems Related to Equinus  Depending on how a patient compensates for the inability to bend properly at the ankle, a variety of foot conditions can develop, including:    Plantar fasciitis (arch/heel pain)  Calf cramping  Tendonitis (inflammation in the Achilles tendon)  Metatarsalgia (pain and/or callusing on the ball of the foot)  Flatfoot  Arthritis of the midfoot (middle area of the foot)  Pressure sores on the ball of the foot or the arch  Bunions and hammertoes  Ankle pain  Shin splints     Diagnosis  Most patients with equinus are unaware they have this condition when they first visit the doctor. Instead, they come to the doctor seeking relief for foot problems associated with equinus.    To diagnose equinus, the foot and ankle surgeon will evaluate the ankle's range of motion when the knee is flexed (bent) as well as extended (straightened). This enables the surgeon to identify whether the tendon or muscle is tight and to assess whether bone is interfering with ankle motion. X-rays may also be ordered. In some cases, the foot and ankle surgeon may refer the  patient for neurologic evaluation.    Nonsurgical Treatment  Treatment includes strategies aimed at relieving the symptoms and conditions associated with equinus. In addition, the patient is treated for the equinus itself through one or more of the following options:    Night splint. The foot may be placed in a splint at night to keep it in a position that helps reduce tightness of the calf muscle.  Heel lifts. Placing heel lifts inside the shoes or wearing shoes with a moderate heel takes stress off the Achilles tendon when walking and may reduce symptoms.  Arch supports or orthotic devices. Custom orthotic devices that fit into the shoe are often prescribed to keep weight distributed properly and to help control muscle/tendon imbalance.  Physical therapy. To help remedy muscle tightness, exercises that stretch the calf muscle(s) are recommended.     When Is Surgery Needed?  In some cases, surgery may be needed to correct the cause of equinus if it is related to a tight tendon or a bone blocking the ankle motion. The foot and ankle surgeon will determine the type of procedure that is best suited to the individual patient.                Ankle Dorsiflexion/Plantarflexion (Flexibility)    Sit on the floor or in bed with your legs straight in front of you.  Point both feet. Then flex both feet.  Do this 10 to 30 times in a row.  Repeat this exercise 2 times a day, or as instructed.  Date Last Reviewed: 5/1/2016 © 2000-2016 [employer]. [address] [place] [address]. All rights reserved. This information is not intended as a substitute for professional medical care. Always follow your healthcare professional's instructions.          Arch retraining    These exercises are for your right foot. Switch sides for your left foot.  Sit in a chair or stand with both feet flat on the floor. Press down with the ball of your right foot, but only on the left side of the foot, just under the big  toe.  Then pull the bottom of your big toe back toward your heel. This should pull up the arch of your foot. Dont flex your toes while doing this. It is a subtle movement of the arch.  Hold for 5 seconds. Relax.  Date Last Reviewed: 3/10/2016  © 5208-9663 [employer]. [address] [place] [address]. All rights reserved. This information is not intended as a substitute for professional medical care. Always follow your healthcare professional's instructions.        Soleus Stretch (Flexibility)    Stand facing a wall from 3 feet away. Take one step toward the wall with your right foot.  Place both palms on the wall. Bend both knees and lean forward. Keep both heels on the floor.  Hold for 30 to 60 seconds. Then relax both legs. Repeat the exercise 2 times.  Switch legs and repeat.  Repeat this exercise 3 times a day, or as instructed.     Tip: Dont bounce while youre stretching.   Date Last Reviewed: 3/10/2016  © 4707-5040 [employer]. [address] [place] [address]. All rights reserved. This information is not intended as a substitute for professional medical care. Always follow your healthcare professional's instructions.          Recommended OTC orthotics:  -powerstep  -superfeet    Recommended shoegear:  -new balance  -ascics  -mizuno  -guerrero        Bunions  Even though bunions are a common foot deformity, there are misconceptions about them. Many people may unnecessarily suffer the pain of bunions for years before seeking treatment.    What Is a Bunion?          A bunion (also referred to as hallux valgus) is often described as a bump on the side of the big toe. But a bunion is more than that. The visible bump actually reflects changes in the bony framework of the front part of the foot. The big toe leans toward the second toe, rather than pointing straight ahead. This throws the bones out of alignment--producing the bunions bump.        Bunions are a  progressive disorder. They begin with a leaning of the big toe, gradually changing the angle of the bones over the years and slowly producing the characteristic bump, which becomes increasingly prominent. Symptoms usually appear at later stages, although some people never have symptoms.    Causes  Bunions are most often caused by an inherited faulty mechanical structure of the foot. It is not the bunion itself that is inherited but certain foot types that make a person prone to developing a bunion.    Although wearing shoes that crowd the toes will not actually cause bunions, it sometimes makes the deformity get progressively worse. Symptoms may therefore appear sooner.    Symptoms  Symptoms, which occur at the site of the bunion, may include:    Pain or soreness  Inflammation and redness  A burning sensation  Possible numbness    Symptoms occur most often when wearing shoes that crowd the toes, such as shoes with a tight toe box or high heels. This may explain why women are more likely to have symptoms than men. In addition, spending long periods of time on your feet can aggravate the symptoms of bunions.    Diagnosis  Diagram indicating location of bunion on a footBunions are readily apparent--the prominence is visible at the base of the big toe or side of the foot. However, to fully evaluate the condition, the foot and ankle surgeon may take x-rays to determine the degree of the deformity and assess the changes that have occurred.    Because bunions are progressive, they do not go away and will usually get worse over time. But not all cases are alike--some bunions progress more rapidly than others. Once your surgeon has evaluated your bunion, a treatment plan can be developed that is suited to your needs.    Nonsurgical Treatment  Sometimes observation of the bunion is all that is needed. To reduce the chance of damage to the joint, periodic evaluation and x-rays by your surgeon are advised.    In many other  cases, however, some type of treatment is needed. Early treatments are aimed at easing the pain of bunions, but they will not reverse the deformity itself. These include:    Changes in shoewear. Wearing the right kind of shoes is very important. Choose shoes that have a wide toe box and forgo those with pointed toes or high heels, which may aggravate the condition.  Padding. Pads placed over the area of the bunion can help minimize pain. These can be obtained from your surgeon or purchased at a drug store.  Activity modifications. Avoid activity that causes bunion pain, including standing for long periods of time.  Medications. Oral nonsteroidal anti-inflammatory drugs (NSAIDs), such as ibuprofen, may be recommended to reduce pain and inflammation.  Icing. Applying an ice pack several times a day helps reduce inflammation and pain.  Injection therapy. Although rarely used in bunion treatment, injections of corticosteroids may be useful in treating the inflamed bursa (fluid-filled sac located around a joint) sometimes seen with bunions.  Orthotic devices. In some cases, custom orthotic devices may be provided by the foot and ankle surgeon.      Recommended OTC orthotics:  -powerstep  -superfeet    Recommended shoegear:  -new balance  -ascics  -mizuno  -guerrero       When Is Surgery Needed?  If nonsurgical treatments fail to relieve bunion pain and when the pain of a bunion interferes with daily activities, it is time to discuss surgical options with a foot and ankle surgeon. Together you can decide if surgery is best for you.    A variety of surgical procedures is available to treat bunions. The procedures are designed to remove the bump of bone, correct the changes in the bony structure of the foot and correct soft tissue changes that may also have occurred. The goal of surgery is the reduction of pain and deformity.    In selecting the procedure or combination of procedures for your particular case, the foot and  ankle surgeon will take into consideration the extent of your deformity based on the x-ray findings, your age, your activity level and other factors. The length of the recovery period will vary, depending on the procedure or procedures performed.                                          Bunion    You have a bunion. This is a bony bump at the base of your big toe, along the inside edge of your foot. As the bump gets bigger, it can become red, swollen, and painful with shoe wear.  Bunions may occur if you wear shoes that are too tight and pinch your toes together. High heels may make this worse. In some cases a bunion is due to poor alignment of the foot and ankle. This puts extra weight on the instep of each foot.  Once a bunion forms, it changes the way weight is spread all across your foot. This causes the bunion to get worse over time. The big toe will bend more and more toward the other toes.  A minor bunion can be treated by:  Wearing properly fitting shoes  Using bunion pads  Wearing shoe inserts, called orthotics, to better align the foot and ankle  Physical therapy with ultrasound or whirlpool baths can ease pain, redness, and swelling. Severe cases may require surgery. If you dont treat what is causing the bunion, it may get larger and more painful.  Home care  Limit high heels. These shoes force your foot forward, crowding the toes together.  Switch to comfortable shoes with a wide toe area. Or have your existing shoes stretched by a shoe repair shop.  Avoid shoes that are tight, narrow, or pointed.  If you are flat-footed, using arch supports may help prevent further deformity. The best shoe inserts are the ones custom made by a foot specialist, called a podiatrist, or other healthcare provider.  Put a bunion pad over the bunion to ease pressure of your shoe against the bunion. You can buy these pads at most pharmacies without a prescription  To reduce pain and swelling, apply an ice pack over the injured  "area for 15 to 20 minutes. Do this every 1 to 2 hours the first day. Keep using ice 3 to 4 times a day until the pain and swelling goes away.  To make an ice pack, put ice cubes in a sealed zip-lock plastic bag. Wrap the bag in a clean, thin towel or cloth. Never put ice or an ice pack directly on the skin.  You may use over-the-counter pain medicine to control pain, unless another medicine was prescribed. Talk with your provider before using these medicines if you have chronic liver or kidney disease, or ever had a stomach ulcer or GI (gastrointestinal) bleeding.    Follow-up care  Follow up with a podiatrist or foot doctor, or as advised.  If X-rays were taken, you will be notified of any new findings that may affect your care.  When to seek medical care  Contact your healthcare provider if any of the following occur:  Increasing pain or redness around the base of the big toe  Painful ingrown toenail, with redness and swelling or pus around the nail  Date Last Reviewed: 11/21/2015  © 6721-9922 [employer]. [address] [place] [address]. All rights reserved. This information is not intended as a substitute for professional medical care. Always follow your healthcare professional's instructions.        Treating Bunions  Although a bunion wont go away, wearing shoes that fit properly will often relieve the pain. Padding and icing the bunion may also help. Bunions that remain painful may need surgery.     Heels: Heel height should be low. The back of the shoe should  your heel firmly so the shoe doesn't flop when you walk.         Toes: There should be 1/2" between your longest toe and the tip of the shoe. The shoe should be wide enough for you to wiggle your toes.    Shoes  To relieve a bunion, you dont have to buy shoes that are ugly or out of fashion. But follow these tips:  Shop for shoes late in the day. This is when your feet are the largest.  Have both feet measured often. Fit " shoes to your larger foot.  Look for shoes that have the same shape as your foot but are slightly wider across the toes.  Choose low-heeled shoes.  Always try shoes on. Stand up and walk around. If the shoes arent comfortable, dont buy them.  Ice massage  To help relieve a painful bunion, put an ice cube in a plastic bag. Rub the ice on the bunion for 5 minutes. Repeat 2 to 3 times a day.  Pads  You may want to put a pad over the bunion to cushion it. You can buy bunion pads at most [employer].  Surgery  Wearing wider shoes and padding the bunion may not relieve the pain. Your healthcare provider may then suggest surgery. During surgery, the bunion is shaved away and the bones are put back in a straight line.   Date Last Reviewed: 9/27/2015  © 7014-7246 [employer]. [address] [place] [address]. All rights reserved. This information is not intended as a substitute for professional medical care. Always follow your healthcare professional's instructions.      Osteotomy and Ligament or Tendon Repair (Bunion Surgery)  Osteotomy and ligament or tendon repair is a type of bunion surgery. A bunion is a bony bump (growth) at the base of your big toe. This growth can form when your big toe pushes against your next toe. A bunion can cause pain, swelling, redness, and other symptoms. During this surgery, bone is removed from your toe. Nearby tendons and ligaments are made shorter or longer as needed. This allows your big toe to line up (align) properly.    Preparing for surgery  Follow any instructions from your healthcare provider.  Tell your surgeon about any medicines you are taking. You may need to stop taking all or some of these before the procedure. This includes:  All prescription medicines  Over-the-counter medicines such as aspirin or ibuprofen  Street drugs  Herbs, vitamins, and other supplements  Also, follow any directions youre given for not eating or drinking before surgery.  The  day of surgery  The surgery takes at least 60 minutes. You will likely go home the same day.  Before the surgery begins  An IV (intravenous) line is put into a vein in your arm or hand. This line gives you fluids and medicines.  You may be given medicine to help you relax (sedation). To keep you free of pain during the surgery, you may have medicine to block the nerves in your foot. Or, you will be given general anesthesia. This puts you into a deep sleep.  During the surgery  A cut (incision) is made on your foot to expose the bunion bump, and the tendons and ligaments around it. The tendons and ligaments that are tight are cut (released).  The bunion bump is removed with a bone saw. Your big toe bone or the main bone in your foot is shortened and realigned. A pin, screw, or plate is used to hold your toe and foot bones together.  The nearby tendons and ligaments may be tightened. If there is extra tissue, it is removed and the ends are stitched (sutured) together. The incision in your skin is then closed with sutures. Your foot is bandaged.  After the surgery  Youll be taken to a recovery room. You may have medicines to manage pain. You may wear a brace, surgical shoe, or cast to protect your foot while it heals. The surgeon will tell you when you can go home. Have an adult family member or friend drive you.  Recovering at home  Once home, follow any instructions you are given. During your recovery:  Take pain medicine exactly as directed.  To prevent swelling, sit or lie with your leg raised on one or more pillows. Do this for the first 2 days.  Follow your surgeon's instructions about putting weight on your foot after the surgery. You may need to use a walker, cane, or crutches for a time.  You may wear a brace, surgical shoe, or cast for up to a month or longer. Care for this as instructed. Keep it dry by wrapping it in plastic bags when bathing.  Avoid sports and other activities until your surgeon says its  OK.  Care for your incision as instructed.  Don't drive until your surgeon says its OK.  Call your surgeon if you have any of the following:  Chest pain or trouble breathing  Fever of 100.4°F (38°C) or higher, or as directed by your surgeon  Pain that isnt helped by medicine or rest  Increased swelling not helped by raising or icing your foot  Signs of infection at any incision site, such as increased redness or swelling, warmth, more pain, or bad-smelling drainage  Bleeding through the bandages  Symptoms of poor circulation, such as toes that look blue instead of pinkish  Numbness that doesnt go away  Any other signs or symptoms indicated by your surgeon   Follow-up  Keep all follow-up appointments with your surgeon. These are to check that you are healing well from the surgery. You may have X-rays to check how the bone is healing. Physical therapy, foot exercises, and other treatments may be discussed at follow-up visits. Full recovery can take at least a few months.  Risks and possible complications include:  Infection  Bleeding  Sensitivity at the incision site for months after the surgery  Foot pain that doesnt go away after surgery  Numbness in the foot  Only partial relief of symptoms, or no relief of symptoms  Return of the bunion  Risks of anesthesia (the anesthesiologist will discuss these with you)  Poor wound healing  Breakage of screws or pins  A lot of scarring   Date Last Reviewed: 7/28/2015  © 3002-6043 [employer]. [address] [place] [id number]. All rights reserved. This information is not intended as a substitute for professional medical care. Always follow your healthcare professional's instructions.
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
## 2023-03-01 ENCOUNTER — OFFICE VISIT (OUTPATIENT)
Dept: PODIATRY | Facility: CLINIC | Age: 48
End: 2023-03-01
Payer: MEDICAID

## 2023-03-01 VITALS
HEART RATE: 77 BPM | SYSTOLIC BLOOD PRESSURE: 119 MMHG | RESPIRATION RATE: 18 BRPM | WEIGHT: 200.5 LBS | DIASTOLIC BLOOD PRESSURE: 75 MMHG | HEIGHT: 68 IN | BODY MASS INDEX: 30.39 KG/M2

## 2023-03-01 DIAGNOSIS — M21.862 ACQUIRED POSTERIOR EQUINUS OF BOTH LOWER EXTREMITIES: ICD-10-CM

## 2023-03-01 DIAGNOSIS — E11.65 TYPE 2 DIABETES MELLITUS WITH HYPERGLYCEMIA, UNSPECIFIED WHETHER LONG TERM INSULIN USE: ICD-10-CM

## 2023-03-01 DIAGNOSIS — M72.2 PLANTAR FASCIITIS, BILATERAL: Primary | ICD-10-CM

## 2023-03-01 DIAGNOSIS — M21.861 ACQUIRED POSTERIOR EQUINUS OF BOTH LOWER EXTREMITIES: ICD-10-CM

## 2023-03-01 PROCEDURE — 3074F SYST BP LT 130 MM HG: CPT | Mod: CPTII,,, | Performed by: PODIATRIST

## 2023-03-01 PROCEDURE — 1159F MED LIST DOCD IN RCRD: CPT | Mod: CPTII,,, | Performed by: PODIATRIST

## 2023-03-01 PROCEDURE — 99214 OFFICE O/P EST MOD 30 MIN: CPT | Mod: PBBFAC,PO | Performed by: PODIATRIST

## 2023-03-01 PROCEDURE — 1160F PR REVIEW ALL MEDS BY PRESCRIBER/CLIN PHARMACIST DOCUMENTED: ICD-10-PCS | Mod: CPTII,,, | Performed by: PODIATRIST

## 2023-03-01 PROCEDURE — 3074F PR MOST RECENT SYSTOLIC BLOOD PRESSURE < 130 MM HG: ICD-10-PCS | Mod: CPTII,,, | Performed by: PODIATRIST

## 2023-03-01 PROCEDURE — 3078F PR MOST RECENT DIASTOLIC BLOOD PRESSURE < 80 MM HG: ICD-10-PCS | Mod: CPTII,,, | Performed by: PODIATRIST

## 2023-03-01 PROCEDURE — 3008F PR BODY MASS INDEX (BMI) DOCUMENTED: ICD-10-PCS | Mod: CPTII,,, | Performed by: PODIATRIST

## 2023-03-01 PROCEDURE — 1160F RVW MEDS BY RX/DR IN RCRD: CPT | Mod: CPTII,,, | Performed by: PODIATRIST

## 2023-03-01 PROCEDURE — 3078F DIAST BP <80 MM HG: CPT | Mod: CPTII,,, | Performed by: PODIATRIST

## 2023-03-01 PROCEDURE — 3008F BODY MASS INDEX DOCD: CPT | Mod: CPTII,,, | Performed by: PODIATRIST

## 2023-03-01 PROCEDURE — 99999 PR PBB SHADOW E&M-EST. PATIENT-LVL IV: ICD-10-PCS | Mod: PBBFAC,,, | Performed by: PODIATRIST

## 2023-03-01 PROCEDURE — 99999 PR PBB SHADOW E&M-EST. PATIENT-LVL IV: CPT | Mod: PBBFAC,,, | Performed by: PODIATRIST

## 2023-03-01 PROCEDURE — 99213 PR OFFICE/OUTPT VISIT, EST, LEVL III, 20-29 MIN: ICD-10-PCS | Mod: S$PBB,,, | Performed by: PODIATRIST

## 2023-03-01 PROCEDURE — 99213 OFFICE O/P EST LOW 20 MIN: CPT | Mod: S$PBB,,, | Performed by: PODIATRIST

## 2023-03-01 PROCEDURE — 1159F PR MEDICATION LIST DOCUMENTED IN MEDICAL RECORD: ICD-10-PCS | Mod: CPTII,,, | Performed by: PODIATRIST

## 2023-03-01 RX ORDER — TRIAMCINOLONE ACETONIDE 40 MG/ML
40 INJECTION, SUSPENSION INTRA-ARTICULAR; INTRAMUSCULAR
Status: DISCONTINUED | OUTPATIENT
Start: 2023-03-01 | End: 2023-03-01 | Stop reason: HOSPADM

## 2023-03-01 RX ADMIN — TRIAMCINOLONE ACETONIDE 40 MG: 40 INJECTION, SUSPENSION INTRA-ARTICULAR; INTRAMUSCULAR at 11:03

## 2023-03-01 NOTE — PATIENT INSTRUCTIONS
Heel Pain (Plantar Fasciitis)        Heel pain is most often caused by plantar fasciitis, a condition that is sometimes also called heel spur syndrome when a spur is present. Heel pain may also be due to other causes, such as a stress fracture, tendonitis, arthritis, nerve irritation or, rarely, a cyst.    Because there are several potential causes, it is important to have heel pain properly diagnosed. A foot and ankle surgeon is able to distinguish between all the possibilities and to determine the underlying source of your heel pain.    What Is Plantar Fasciitis?  Heel pain is often caused by plantar fasciitis  Plantar fasciitis is an inflammation of the band of tissue (the plantar fascia) that extends from the heel to the toes. In this condition, the fascia first becomes irritated and then inflamed, resulting in heel pain.    Causes  The most common cause of plantar fasciitis relates to faulty structure of the foot. For example, people who have problems with their arches, either overly flat feet or high-arched feet, are more prone to developing plantar fasciitis.    Wearing nonsupportive footwear on hard, flat surfaces puts abnormal strain on the plantar fascia and can also lead to plantar fasciitis. This is particularly evident when ones job requires long hours on the feet. Obesity and overuse may also contribute to plantar fasciitis.    Symptoms  The symptoms of plantar fasciitis are:    Pain on the bottom of the heel  Pain in the arch of the foot  Pain that is usually worse upon arising  Pain that increases over a period of months  Swelling on the bottom of the heel     People with plantar fasciitis often describe the pain as worse when they get up in the morning or after they have been sitting for long periods of time. After a few minutes of walking, the pain decreases because walking stretches the fascia. For some people, the pain subsides but returns after spending long periods of time on their  feet.    Diagnosis  To arrive at a diagnosis, the foot and ankle surgeon will obtain your medical history and examine your foot. Throughout this process, the surgeon rules out all possible causes for your heel pain other than plantar fasciitis.    In addition, diagnostic imaging studies, such as x-rays or other imaging modalities, may be used to distinguish the different types of heel pain. Sometimes heel spurs are found in patients with plantar fasciitis, but these are rarely a source of pain. When they are present, the condition may be diagnosed as plantar fasciitis/heel spur syndrome.    Nonsurgical Treatment  Treatment of plantar fasciitis begins with first-line strategies, which you can begin at home:    -Stretching exercises. Exercises that stretch out the calf muscles help ease pain and assist with recovery.  -Avoid going barefoot. When you walk without shoes, you put undue strain and stress on your plantar fascia.  -Ice. Putting an ice pack on your heel for 20 minutes several times a day helps reduce inflammation. Place a thin towel between the ice and your heel; do not apply ice directly to the skin.  -Limit activities. Cut down on extended physical activities to give your heel a rest.  -Shoe modifications. Wearing supportive shoes that have good arch support and a slightly raised heel reduces stress on the plantar fascia.  -Medications. Oral nonsteroidal anti-inflammatory drugs (NSAIDs), such as ibuprofen, may be recommended to reduce pain and inflammation.     If you still have pain after several weeks, see your foot and ankle surgeon, who may add one or more of these treatment approaches:    -Padding, taping and strapping. Placing pads in the shoe softens the impact of walking. Taping and strapping help support the foot and reduce strain on the fascia.  -Orthotic devices. Custom orthotic devices that fit into your shoe help correct the underlying structural abnormalities causing the plantar  fasciitis.  -Injection therapy. In some cases, corticosteroid injections are used to help reduce the inflammation and relieve pain.  -Removable walking cast. A removable walking cast may be used to keep your foot immobile for a few weeks to allow it to rest and heal.  -Night splint. Wearing a night splint allows you to maintain an extended stretch of the plantar fascia while sleeping. This may help reduce the morning pain experienced by some patients.  -Physical therapy. Exercises and other physical therapy measures may be used to help provide relief.     When Is Surgery Needed?  Although most patients with plantar fasciitis respond to nonsurgical treatment, a small percentage of patients may require surgery. If, after several months of nonsurgical treatment, you continue to have heel pain, surgery will be considered. Your foot and ankle surgeon will discuss the surgical options with you and determine which approach would be most beneficial for you.    Long-Term Care  No matter what kind of treatment you undergo for plantar fasciitis, the underlying causes that led to this condition may remain. Therefore, you will need to continue with preventive measures. Wearing supportive shoes, stretching and using custom orthotic devices are the mainstay of long-term treatment for plantar fasciitis.            Understanding Heel Pain  Your heel is the back part of your foot. A band of tissue called the plantar fascia connects the heel bone to the bones in the ball of your foot. Nerves run from the heel up the inside of your ankle and into your leg. When you feel pain in the bottom of your heel, the plantar fascia may be inflamed. Overuse, Achilles tightness, or excess body weight can cause the tissue to tear or pull away from the bone. Sometimes the inflamed plantar fascia also irritates a nerve, causing more pain.    What causes heel pain?  Wearing shoes with poor cushioning can irritate the tissue in your heel (plantar  fascia). Being overweight or standing for long periods can also irritate the tissue. Running, walking, tennis, and other sports that put stress on the heels can cause tiny tears in the tissue. If your lower leg muscles are tight, this is more likely to occur. A tight Achilles tendon will also contribute to heel pain.  Symptoms  You may feel pain on the bottom or on the inside edge of your heel. The pain may be sharp when you get out of bed or when you stand up after sitting for a while. You may feel a dull ache in your heel after youve been standing for a long time on a hard surface. Running can also cause a dull ache.  Preventing future problems  To prevent future heel pain, wear shoes with well-cushioned heels. And do exercises prescribed by your healthcare provider to stretch the plantar fascia and the muscles in the lower leg.   Date Last Reviewed: 9/10/2015  © 3471-4236 "[employer]. [address] [place] [address]. All rights reserved. This information is not intended as a substitute for professional medical care. Always follow your healthcare professional's instructions.      Treating Plantar Fasciitis  First, your healthcare provider tries to determine the cause of your problem in order to suggest ways to relieve pain. If your pain is due to poor foot mechanics, custom-made shoe inserts (orthoses) may help.    Reduce symptoms  To relieve mild symptoms, try aspirin, ibuprofen, or other medicines as directed. Rubbing ice on the affected area may also help.  To reduce severe pain and swelling, your healthcare provider may prescribe pills or injections or a walking cast in some instances. Physical therapy, such as ultrasound or a daily stretching program, may also be recommended. Surgery is rarely required.  To reduce symptoms caused by poor foot mechanics, your foot may be taped. This supports the arch and temporarily controls movement. Night splints may also help by stretching the  fascia.  Control movement  If taping helps, your healthcare provider may prescribe orthoses. Built from plaster casts of your feet, these inserts control the way your foot moves. As a result, your symptoms should go away.  Reduce overuse  Every time your foot strikes the ground, the plantar fascia is stretched. You can reduce the strain on the plantar fascia and the possibility of overuse by following these suggestions:  Lose any excess weight.  Avoid running on hard or uneven ground.  Use orthoses at all times in your shoes and house slippers.  If surgery is needed  Your healthcare provider may consider surgery if other types of treatment don't control your pain. During surgery, the plantar fascia is partially cut to release tension. As you heal, fibrous tissue fills the space between the heel bone and the plantar fascia.   Date Last Reviewed: 10/14/2015  © 8493-2890 [employer]. [address] [place] [address]. All rights reserved. This information is not intended as a substitute for professional medical care. Always follow your healthcare professional's instructions.      Equinus          What Is Equinus?    Equinus is a condition in which the upward bending motion of the ankle joint is limited. Someone with equinus lacks the flexibility to bring the top of the foot toward the front of the leg. Equinus can occur in one or both feet. When it involves both feet, the limitation of motion is sometimes worse in one foot than in the other.    People with equinus develop ways to compensate for their limited ankle motion, and this often leads to other foot, leg or back problems. The most common methods of compensation are flattening of the arch or picking up the heel early when walking, placing increased pressure on the ball of the foot. Other patients compensate by toe walking, while a smaller number take steps by bending abnormally at the hip or knee.    Causes  There are several possible causes  for the limited range of ankle motion. Often, it is due to tightness in the Achilles tendon or calf muscles (the soleus muscle and/or gastrocnemius muscle). In some patients, this tightness is congenital (present at birth), and sometimes it is an inherited trait. Other patients acquire the tightness from being in a cast, being on crutches or frequently wearing high-heeled shoes. In addition, diabetes can affect the fibers of the Achilles tendon and cause tightness. Sometimes equinus is related to a bone blocking the ankle motion. For example, a fragment of a broken bone following an ankle injury, or bone block, can get in the way and restrict motion. Equinus may also result from one leg being shorter than the other. Less often, equinus is caused by spasms in the calf muscle. These spasms may be signs of an underlying neurologic disorder.      Foot Problems Related to Equinus  Depending on how a patient compensates for the inability to bend properly at the ankle, a variety of foot conditions can develop, including:    Plantar fasciitis (arch/heel pain)  Calf cramping  Tendonitis (inflammation in the Achilles tendon)  Metatarsalgia (pain and/or callusing on the ball of the foot)  Flatfoot  Arthritis of the midfoot (middle area of the foot)  Pressure sores on the ball of the foot or the arch  Bunions and hammertoes  Ankle pain  Shin splints     Diagnosis  Most patients with equinus are unaware they have this condition when they first visit the doctor. Instead, they come to the doctor seeking relief for foot problems associated with equinus.    To diagnose equinus, the foot and ankle surgeon will evaluate the ankle's range of motion when the knee is flexed (bent) as well as extended (straightened). This enables the surgeon to identify whether the tendon or muscle is tight and to assess whether bone is interfering with ankle motion. X-rays may also be ordered. In some cases, the foot and ankle surgeon may refer the  patient for neurologic evaluation.    Nonsurgical Treatment  Treatment includes strategies aimed at relieving the symptoms and conditions associated with equinus. In addition, the patient is treated for the equinus itself through one or more of the following options:    Night splint. The foot may be placed in a splint at night to keep it in a position that helps reduce tightness of the calf muscle.  Heel lifts. Placing heel lifts inside the shoes or wearing shoes with a moderate heel takes stress off the Achilles tendon when walking and may reduce symptoms.  Arch supports or orthotic devices. Custom orthotic devices that fit into the shoe are often prescribed to keep weight distributed properly and to help control muscle/tendon imbalance.  Physical therapy. To help remedy muscle tightness, exercises that stretch the calf muscle(s) are recommended.     When Is Surgery Needed?  In some cases, surgery may be needed to correct the cause of equinus if it is related to a tight tendon or a bone blocking the ankle motion. The foot and ankle surgeon will determine the type of procedure that is best suited to the individual patient.                Ankle Dorsiflexion/Plantarflexion (Flexibility)    Sit on the floor or in bed with your legs straight in front of you.  Point both feet. Then flex both feet.  Do this 10 to 30 times in a row.  Repeat this exercise 2 times a day, or as instructed.  Date Last Reviewed: 5/1/2016 © 2000-2016 [employer]. [address] [place] [address]. All rights reserved. This information is not intended as a substitute for professional medical care. Always follow your healthcare professional's instructions.          Arch retraining    These exercises are for your right foot. Switch sides for your left foot.  Sit in a chair or stand with both feet flat on the floor. Press down with the ball of your right foot, but only on the left side of the foot, just under the big  toe.  Then pull the bottom of your big toe back toward your heel. This should pull up the arch of your foot. Dont flex your toes while doing this. It is a subtle movement of the arch.  Hold for 5 seconds. Relax.  Date Last Reviewed: 3/10/2016  © 7022-8710 [employer]. [address] [place] [address]. All rights reserved. This information is not intended as a substitute for professional medical care. Always follow your healthcare professional's instructions.        Soleus Stretch (Flexibility)    Stand facing a wall from 3 feet away. Take one step toward the wall with your right foot.  Place both palms on the wall. Bend both knees and lean forward. Keep both heels on the floor.  Hold for 30 to 60 seconds. Then relax both legs. Repeat the exercise 2 times.  Switch legs and repeat.  Repeat this exercise 3 times a day, or as instructed.     Tip: Dont bounce while youre stretching.   Date Last Reviewed: 3/10/2016  © 9228-7814 [employer]. [address] [place] [address]. All rights reserved. This information is not intended as a substitute for professional medical care. Always follow your healthcare professional's instructions.          Recommended OTC orthotics:  -powerstep  -superfeet    Recommended shoegear:  -new balance  -ascics  -mizuno  -guerrero        Bunions  Even though bunions are a common foot deformity, there are misconceptions about them. Many people may unnecessarily suffer the pain of bunions for years before seeking treatment.    What Is a Bunion?          A bunion (also referred to as hallux valgus) is often described as a bump on the side of the big toe. But a bunion is more than that. The visible bump actually reflects changes in the bony framework of the front part of the foot. The big toe leans toward the second toe, rather than pointing straight ahead. This throws the bones out of alignment--producing the bunions bump.        Bunions are a  progressive disorder. They begin with a leaning of the big toe, gradually changing the angle of the bones over the years and slowly producing the characteristic bump, which becomes increasingly prominent. Symptoms usually appear at later stages, although some people never have symptoms.    Causes  Bunions are most often caused by an inherited faulty mechanical structure of the foot. It is not the bunion itself that is inherited but certain foot types that make a person prone to developing a bunion.    Although wearing shoes that crowd the toes will not actually cause bunions, it sometimes makes the deformity get progressively worse. Symptoms may therefore appear sooner.    Symptoms  Symptoms, which occur at the site of the bunion, may include:    Pain or soreness  Inflammation and redness  A burning sensation  Possible numbness    Symptoms occur most often when wearing shoes that crowd the toes, such as shoes with a tight toe box or high heels. This may explain why women are more likely to have symptoms than men. In addition, spending long periods of time on your feet can aggravate the symptoms of bunions.    Diagnosis  Diagram indicating location of bunion on a footBunions are readily apparent--the prominence is visible at the base of the big toe or side of the foot. However, to fully evaluate the condition, the foot and ankle surgeon may take x-rays to determine the degree of the deformity and assess the changes that have occurred.    Because bunions are progressive, they do not go away and will usually get worse over time. But not all cases are alike--some bunions progress more rapidly than others. Once your surgeon has evaluated your bunion, a treatment plan can be developed that is suited to your needs.    Nonsurgical Treatment  Sometimes observation of the bunion is all that is needed. To reduce the chance of damage to the joint, periodic evaluation and x-rays by your surgeon are advised.    In many other  cases, however, some type of treatment is needed. Early treatments are aimed at easing the pain of bunions, but they will not reverse the deformity itself. These include:    Changes in shoewear. Wearing the right kind of shoes is very important. Choose shoes that have a wide toe box and forgo those with pointed toes or high heels, which may aggravate the condition.  Padding. Pads placed over the area of the bunion can help minimize pain. These can be obtained from your surgeon or purchased at a drug store.  Activity modifications. Avoid activity that causes bunion pain, including standing for long periods of time.  Medications. Oral nonsteroidal anti-inflammatory drugs (NSAIDs), such as ibuprofen, may be recommended to reduce pain and inflammation.  Icing. Applying an ice pack several times a day helps reduce inflammation and pain.  Injection therapy. Although rarely used in bunion treatment, injections of corticosteroids may be useful in treating the inflamed bursa (fluid-filled sac located around a joint) sometimes seen with bunions.  Orthotic devices. In some cases, custom orthotic devices may be provided by the foot and ankle surgeon.      Recommended OTC orthotics:  -powerstep  -superfeet    Recommended shoegear:  -new balance  -ascics  -mizuno  -guerrero       When Is Surgery Needed?  If nonsurgical treatments fail to relieve bunion pain and when the pain of a bunion interferes with daily activities, it is time to discuss surgical options with a foot and ankle surgeon. Together you can decide if surgery is best for you.    A variety of surgical procedures is available to treat bunions. The procedures are designed to remove the bump of bone, correct the changes in the bony structure of the foot and correct soft tissue changes that may also have occurred. The goal of surgery is the reduction of pain and deformity.    In selecting the procedure or combination of procedures for your particular case, the foot and  ankle surgeon will take into consideration the extent of your deformity based on the x-ray findings, your age, your activity level and other factors. The length of the recovery period will vary, depending on the procedure or procedures performed.                                          Bunion    You have a bunion. This is a bony bump at the base of your big toe, along the inside edge of your foot. As the bump gets bigger, it can become red, swollen, and painful with shoe wear.  Bunions may occur if you wear shoes that are too tight and pinch your toes together. High heels may make this worse. In some cases a bunion is due to poor alignment of the foot and ankle. This puts extra weight on the instep of each foot.  Once a bunion forms, it changes the way weight is spread all across your foot. This causes the bunion to get worse over time. The big toe will bend more and more toward the other toes.  A minor bunion can be treated by:  Wearing properly fitting shoes  Using bunion pads  Wearing shoe inserts, called orthotics, to better align the foot and ankle  Physical therapy with ultrasound or whirlpool baths can ease pain, redness, and swelling. Severe cases may require surgery. If you dont treat what is causing the bunion, it may get larger and more painful.  Home care  Limit high heels. These shoes force your foot forward, crowding the toes together.  Switch to comfortable shoes with a wide toe area. Or have your existing shoes stretched by a shoe repair shop.  Avoid shoes that are tight, narrow, or pointed.  If you are flat-footed, using arch supports may help prevent further deformity. The best shoe inserts are the ones custom made by a foot specialist, called a podiatrist, or other healthcare provider.  Put a bunion pad over the bunion to ease pressure of your shoe against the bunion. You can buy these pads at most pharmacies without a prescription  To reduce pain and swelling, apply an ice pack over the injured  "area for 15 to 20 minutes. Do this every 1 to 2 hours the first day. Keep using ice 3 to 4 times a day until the pain and swelling goes away.  To make an ice pack, put ice cubes in a sealed zip-lock plastic bag. Wrap the bag in a clean, thin towel or cloth. Never put ice or an ice pack directly on the skin.  You may use over-the-counter pain medicine to control pain, unless another medicine was prescribed. Talk with your provider before using these medicines if you have chronic liver or kidney disease, or ever had a stomach ulcer or GI (gastrointestinal) bleeding.    Follow-up care  Follow up with a podiatrist or foot doctor, or as advised.  If X-rays were taken, you will be notified of any new findings that may affect your care.  When to seek medical care  Contact your healthcare provider if any of the following occur:  Increasing pain or redness around the base of the big toe  Painful ingrown toenail, with redness and swelling or pus around the nail  Date Last Reviewed: 11/21/2015  © 3238-9741 [employer]. [address] [place] [address]. All rights reserved. This information is not intended as a substitute for professional medical care. Always follow your healthcare professional's instructions.        Treating Bunions  Although a bunion wont go away, wearing shoes that fit properly will often relieve the pain. Padding and icing the bunion may also help. Bunions that remain painful may need surgery.     Heels: Heel height should be low. The back of the shoe should  your heel firmly so the shoe doesn't flop when you walk.         Toes: There should be 1/2" between your longest toe and the tip of the shoe. The shoe should be wide enough for you to wiggle your toes.    Shoes  To relieve a bunion, you dont have to buy shoes that are ugly or out of fashion. But follow these tips:  Shop for shoes late in the day. This is when your feet are the largest.  Have both feet measured often. Fit " shoes to your larger foot.  Look for shoes that have the same shape as your foot but are slightly wider across the toes.  Choose low-heeled shoes.  Always try shoes on. Stand up and walk around. If the shoes arent comfortable, dont buy them.  Ice massage  To help relieve a painful bunion, put an ice cube in a plastic bag. Rub the ice on the bunion for 5 minutes. Repeat 2 to 3 times a day.  Pads  You may want to put a pad over the bunion to cushion it. You can buy bunion pads at most [employer].  Surgery  Wearing wider shoes and padding the bunion may not relieve the pain. Your healthcare provider may then suggest surgery. During surgery, the bunion is shaved away and the bones are put back in a straight line.   Date Last Reviewed: 9/27/2015  © 0945-0166 [employer]. [address] [place] [address]. All rights reserved. This information is not intended as a substitute for professional medical care. Always follow your healthcare professional's instructions.      Osteotomy and Ligament or Tendon Repair (Bunion Surgery)  Osteotomy and ligament or tendon repair is a type of bunion surgery. A bunion is a bony bump (growth) at the base of your big toe. This growth can form when your big toe pushes against your next toe. A bunion can cause pain, swelling, redness, and other symptoms. During this surgery, bone is removed from your toe. Nearby tendons and ligaments are made shorter or longer as needed. This allows your big toe to line up (align) properly.    Preparing for surgery  Follow any instructions from your healthcare provider.  Tell your surgeon about any medicines you are taking. You may need to stop taking all or some of these before the procedure. This includes:  All prescription medicines  Over-the-counter medicines such as aspirin or ibuprofen  Street drugs  Herbs, vitamins, and other supplements  Also, follow any directions youre given for not eating or drinking before surgery.  The  day of surgery  The surgery takes at least 60 minutes. You will likely go home the same day.  Before the surgery begins  An IV (intravenous) line is put into a vein in your arm or hand. This line gives you fluids and medicines.  You may be given medicine to help you relax (sedation). To keep you free of pain during the surgery, you may have medicine to block the nerves in your foot. Or, you will be given general anesthesia. This puts you into a deep sleep.  During the surgery  A cut (incision) is made on your foot to expose the bunion bump, and the tendons and ligaments around it. The tendons and ligaments that are tight are cut (released).  The bunion bump is removed with a bone saw. Your big toe bone or the main bone in your foot is shortened and realigned. A pin, screw, or plate is used to hold your toe and foot bones together.  The nearby tendons and ligaments may be tightened. If there is extra tissue, it is removed and the ends are stitched (sutured) together. The incision in your skin is then closed with sutures. Your foot is bandaged.  After the surgery  Youll be taken to a recovery room. You may have medicines to manage pain. You may wear a brace, surgical shoe, or cast to protect your foot while it heals. The surgeon will tell you when you can go home. Have an adult family member or friend drive you.  Recovering at home  Once home, follow any instructions you are given. During your recovery:  Take pain medicine exactly as directed.  To prevent swelling, sit or lie with your leg raised on one or more pillows. Do this for the first 2 days.  Follow your surgeon's instructions about putting weight on your foot after the surgery. You may need to use a walker, cane, or crutches for a time.  You may wear a brace, surgical shoe, or cast for up to a month or longer. Care for this as instructed. Keep it dry by wrapping it in plastic bags when bathing.  Avoid sports and other activities until your surgeon says its  OK.  Care for your incision as instructed.  Don't drive until your surgeon says its OK.  Call your surgeon if you have any of the following:  Chest pain or trouble breathing  Fever of 100.4°F (38°C) or higher, or as directed by your surgeon  Pain that isnt helped by medicine or rest  Increased swelling not helped by raising or icing your foot  Signs of infection at any incision site, such as increased redness or swelling, warmth, more pain, or bad-smelling drainage  Bleeding through the bandages  Symptoms of poor circulation, such as toes that look blue instead of pinkish  Numbness that doesnt go away  Any other signs or symptoms indicated by your surgeon   Follow-up  Keep all follow-up appointments with your surgeon. These are to check that you are healing well from the surgery. You may have X-rays to check how the bone is healing. Physical therapy, foot exercises, and other treatments may be discussed at follow-up visits. Full recovery can take at least a few months.  Risks and possible complications include:  Infection  Bleeding  Sensitivity at the incision site for months after the surgery  Foot pain that doesnt go away after surgery  Numbness in the foot  Only partial relief of symptoms, or no relief of symptoms  Return of the bunion  Risks of anesthesia (the anesthesiologist will discuss these with you)  Poor wound healing  Breakage of screws or pins  A lot of scarring   Date Last Reviewed: 7/28/2015  © 4553-5075 [employer]. [address] [place] [id number]. All rights reserved. This information is not intended as a substitute for professional medical care. Always follow your healthcare professional's instructions.
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
## 2023-04-26 ENCOUNTER — OFFICE VISIT (OUTPATIENT)
Dept: PODIATRY | Facility: CLINIC | Age: 48
End: 2023-04-26
Payer: MEDICAID

## 2023-04-26 VITALS
HEART RATE: 64 BPM | HEIGHT: 68 IN | RESPIRATION RATE: 18 BRPM | WEIGHT: 198.5 LBS | BODY MASS INDEX: 30.08 KG/M2 | SYSTOLIC BLOOD PRESSURE: 109 MMHG | DIASTOLIC BLOOD PRESSURE: 73 MMHG

## 2023-04-26 DIAGNOSIS — M72.2 PLANTAR FASCIITIS, BILATERAL: Primary | ICD-10-CM

## 2023-04-26 DIAGNOSIS — M21.862 ACQUIRED POSTERIOR EQUINUS OF BOTH LOWER EXTREMITIES: ICD-10-CM

## 2023-04-26 DIAGNOSIS — M21.612 BILATERAL BUNIONS: ICD-10-CM

## 2023-04-26 DIAGNOSIS — M21.611 BILATERAL BUNIONS: ICD-10-CM

## 2023-04-26 DIAGNOSIS — M21.861 ACQUIRED POSTERIOR EQUINUS OF BOTH LOWER EXTREMITIES: ICD-10-CM

## 2023-04-26 PROCEDURE — 1159F MED LIST DOCD IN RCRD: CPT | Mod: CPTII,,, | Performed by: PODIATRIST

## 2023-04-26 PROCEDURE — 3074F PR MOST RECENT SYSTOLIC BLOOD PRESSURE < 130 MM HG: ICD-10-PCS | Mod: CPTII,,, | Performed by: PODIATRIST

## 2023-04-26 PROCEDURE — 1160F RVW MEDS BY RX/DR IN RCRD: CPT | Mod: CPTII,,, | Performed by: PODIATRIST

## 2023-04-26 PROCEDURE — 99213 OFFICE O/P EST LOW 20 MIN: CPT | Mod: S$PBB,,, | Performed by: PODIATRIST

## 2023-04-26 PROCEDURE — 99999 PR PBB SHADOW E&M-EST. PATIENT-LVL III: ICD-10-PCS | Mod: PBBFAC,,, | Performed by: PODIATRIST

## 2023-04-26 PROCEDURE — 1159F PR MEDICATION LIST DOCUMENTED IN MEDICAL RECORD: ICD-10-PCS | Mod: CPTII,,, | Performed by: PODIATRIST

## 2023-04-26 PROCEDURE — 3078F PR MOST RECENT DIASTOLIC BLOOD PRESSURE < 80 MM HG: ICD-10-PCS | Mod: CPTII,,, | Performed by: PODIATRIST

## 2023-04-26 PROCEDURE — 1160F PR REVIEW ALL MEDS BY PRESCRIBER/CLIN PHARMACIST DOCUMENTED: ICD-10-PCS | Mod: CPTII,,, | Performed by: PODIATRIST

## 2023-04-26 PROCEDURE — 99999 PR PBB SHADOW E&M-EST. PATIENT-LVL III: CPT | Mod: PBBFAC,,, | Performed by: PODIATRIST

## 2023-04-26 PROCEDURE — 99213 OFFICE O/P EST LOW 20 MIN: CPT | Mod: PBBFAC,PO | Performed by: PODIATRIST

## 2023-04-26 PROCEDURE — 99213 PR OFFICE/OUTPT VISIT, EST, LEVL III, 20-29 MIN: ICD-10-PCS | Mod: S$PBB,,, | Performed by: PODIATRIST

## 2023-04-26 PROCEDURE — 3074F SYST BP LT 130 MM HG: CPT | Mod: CPTII,,, | Performed by: PODIATRIST

## 2023-04-26 PROCEDURE — 3078F DIAST BP <80 MM HG: CPT | Mod: CPTII,,, | Performed by: PODIATRIST

## 2023-04-26 PROCEDURE — 3008F BODY MASS INDEX DOCD: CPT | Mod: CPTII,,, | Performed by: PODIATRIST

## 2023-04-26 PROCEDURE — 3008F PR BODY MASS INDEX (BMI) DOCUMENTED: ICD-10-PCS | Mod: CPTII,,, | Performed by: PODIATRIST

## 2023-04-26 RX ORDER — NABUMETONE 500 MG/1
500 TABLET, FILM COATED ORAL 2 TIMES DAILY
Qty: 60 TABLET | Refills: 0 | Status: SHIPPED | OUTPATIENT
Start: 2023-04-26 | End: 2023-05-26

## 2023-04-26 NOTE — PATIENT INSTRUCTIONS
Heel Pain (Plantar Fasciitis)        Heel pain is most often caused by plantar fasciitis, a condition that is sometimes also called heel spur syndrome when a spur is present. Heel pain may also be due to other causes, such as a stress fracture, tendonitis, arthritis, nerve irritation or, rarely, a cyst.    Because there are several potential causes, it is important to have heel pain properly diagnosed. A foot and ankle surgeon is able to distinguish between all the possibilities and to determine the underlying source of your heel pain.    What Is Plantar Fasciitis?  Heel pain is often caused by plantar fasciitis  Plantar fasciitis is an inflammation of the band of tissue (the plantar fascia) that extends from the heel to the toes. In this condition, the fascia first becomes irritated and then inflamed, resulting in heel pain.    Causes  The most common cause of plantar fasciitis relates to faulty structure of the foot. For example, people who have problems with their arches, either overly flat feet or high-arched feet, are more prone to developing plantar fasciitis.    Wearing nonsupportive footwear on hard, flat surfaces puts abnormal strain on the plantar fascia and can also lead to plantar fasciitis. This is particularly evident when ones job requires long hours on the feet. Obesity and overuse may also contribute to plantar fasciitis.    Symptoms  The symptoms of plantar fasciitis are:    Pain on the bottom of the heel  Pain in the arch of the foot  Pain that is usually worse upon arising  Pain that increases over a period of months  Swelling on the bottom of the heel     People with plantar fasciitis often describe the pain as worse when they get up in the morning or after they have been sitting for long periods of time. After a few minutes of walking, the pain decreases because walking stretches the fascia. For some people, the pain subsides but returns after spending long periods of time on their  feet.    Diagnosis  To arrive at a diagnosis, the foot and ankle surgeon will obtain your medical history and examine your foot. Throughout this process, the surgeon rules out all possible causes for your heel pain other than plantar fasciitis.    In addition, diagnostic imaging studies, such as x-rays or other imaging modalities, may be used to distinguish the different types of heel pain. Sometimes heel spurs are found in patients with plantar fasciitis, but these are rarely a source of pain. When they are present, the condition may be diagnosed as plantar fasciitis/heel spur syndrome.    Nonsurgical Treatment  Treatment of plantar fasciitis begins with first-line strategies, which you can begin at home:    -Stretching exercises. Exercises that stretch out the calf muscles help ease pain and assist with recovery.  -Avoid going barefoot. When you walk without shoes, you put undue strain and stress on your plantar fascia.  -Ice. Putting an ice pack on your heel for 20 minutes several times a day helps reduce inflammation. Place a thin towel between the ice and your heel; do not apply ice directly to the skin.  -Limit activities. Cut down on extended physical activities to give your heel a rest.  -Shoe modifications. Wearing supportive shoes that have good arch support and a slightly raised heel reduces stress on the plantar fascia.  -Medications. Oral nonsteroidal anti-inflammatory drugs (NSAIDs), such as ibuprofen, may be recommended to reduce pain and inflammation.     If you still have pain after several weeks, see your foot and ankle surgeon, who may add one or more of these treatment approaches:    -Padding, taping and strapping. Placing pads in the shoe softens the impact of walking. Taping and strapping help support the foot and reduce strain on the fascia.  -Orthotic devices. Custom orthotic devices that fit into your shoe help correct the underlying structural abnormalities causing the plantar  fasciitis.  -Injection therapy. In some cases, corticosteroid injections are used to help reduce the inflammation and relieve pain.  -Removable walking cast. A removable walking cast may be used to keep your foot immobile for a few weeks to allow it to rest and heal.  -Night splint. Wearing a night splint allows you to maintain an extended stretch of the plantar fascia while sleeping. This may help reduce the morning pain experienced by some patients.  -Physical therapy. Exercises and other physical therapy measures may be used to help provide relief.     When Is Surgery Needed?  Although most patients with plantar fasciitis respond to nonsurgical treatment, a small percentage of patients may require surgery. If, after several months of nonsurgical treatment, you continue to have heel pain, surgery will be considered. Your foot and ankle surgeon will discuss the surgical options with you and determine which approach would be most beneficial for you.    Long-Term Care  No matter what kind of treatment you undergo for plantar fasciitis, the underlying causes that led to this condition may remain. Therefore, you will need to continue with preventive measures. Wearing supportive shoes, stretching and using custom orthotic devices are the mainstay of long-term treatment for plantar fasciitis.            Understanding Heel Pain  Your heel is the back part of your foot. A band of tissue called the plantar fascia connects the heel bone to the bones in the ball of your foot. Nerves run from the heel up the inside of your ankle and into your leg. When you feel pain in the bottom of your heel, the plantar fascia may be inflamed. Overuse, Achilles tightness, or excess body weight can cause the tissue to tear or pull away from the bone. Sometimes the inflamed plantar fascia also irritates a nerve, causing more pain.    What causes heel pain?  Wearing shoes with poor cushioning can irritate the tissue in your heel (plantar  fascia). Being overweight or standing for long periods can also irritate the tissue. Running, walking, tennis, and other sports that put stress on the heels can cause tiny tears in the tissue. If your lower leg muscles are tight, this is more likely to occur. A tight Achilles tendon will also contribute to heel pain.  Symptoms  You may feel pain on the bottom or on the inside edge of your heel. The pain may be sharp when you get out of bed or when you stand up after sitting for a while. You may feel a dull ache in your heel after youve been standing for a long time on a hard surface. Running can also cause a dull ache.  Preventing future problems  To prevent future heel pain, wear shoes with well-cushioned heels. And do exercises prescribed by your healthcare provider to stretch the plantar fascia and the muscles in the lower leg.   Date Last Reviewed: 9/10/2015  © 9132-8485 [employer] [address] [place] [address]. All rights reserved. This information is not intended as a substitute for professional medical care. Always follow your healthcare professional's instructions.      Treating Plantar Fasciitis  First, your healthcare provider tries to determine the cause of your problem in order to suggest ways to relieve pain. If your pain is due to poor foot mechanics, custom-made shoe inserts (orthoses) may help.    Reduce symptoms  To relieve mild symptoms, try aspirin, ibuprofen, or other medicines as directed. Rubbing ice on the affected area may also help.  To reduce severe pain and swelling, your healthcare provider may prescribe pills or injections or a walking cast in some instances. Physical therapy, such as ultrasound or a daily stretching program, may also be recommended. Surgery is rarely required.  To reduce symptoms caused by poor foot mechanics, your foot may be taped. This supports the arch and temporarily controls movement. Night splints may also help by stretching the  fascia.  Control movement  If taping helps, your healthcare provider may prescribe orthoses. Built from plaster casts of your feet, these inserts control the way your foot moves. As a result, your symptoms should go away.  Reduce overuse  Every time your foot strikes the ground, the plantar fascia is stretched. You can reduce the strain on the plantar fascia and the possibility of overuse by following these suggestions:  Lose any excess weight.  Avoid running on hard or uneven ground.  Use orthoses at all times in your shoes and house slippers.  If surgery is needed  Your healthcare provider may consider surgery if other types of treatment don't control your pain. During surgery, the plantar fascia is partially cut to release tension. As you heal, fibrous tissue fills the space between the heel bone and the plantar fascia.   Date Last Reviewed: 10/14/2015  © 3432-4756 [employer]. [address] [place] [address]. All rights reserved. This information is not intended as a substitute for professional medical care. Always follow your healthcare professional's instructions.      Equinus          What Is Equinus?    Equinus is a condition in which the upward bending motion of the ankle joint is limited. Someone with equinus lacks the flexibility to bring the top of the foot toward the front of the leg. Equinus can occur in one or both feet. When it involves both feet, the limitation of motion is sometimes worse in one foot than in the other.    People with equinus develop ways to compensate for their limited ankle motion, and this often leads to other foot, leg or back problems. The most common methods of compensation are flattening of the arch or picking up the heel early when walking, placing increased pressure on the ball of the foot. Other patients compensate by toe walking, while a smaller number take steps by bending abnormally at the hip or knee.    Causes  There are several possible causes  for the limited range of ankle motion. Often, it is due to tightness in the Achilles tendon or calf muscles (the soleus muscle and/or gastrocnemius muscle). In some patients, this tightness is congenital (present at birth), and sometimes it is an inherited trait. Other patients acquire the tightness from being in a cast, being on crutches or frequently wearing high-heeled shoes. In addition, diabetes can affect the fibers of the Achilles tendon and cause tightness. Sometimes equinus is related to a bone blocking the ankle motion. For example, a fragment of a broken bone following an ankle injury, or bone block, can get in the way and restrict motion. Equinus may also result from one leg being shorter than the other. Less often, equinus is caused by spasms in the calf muscle. These spasms may be signs of an underlying neurologic disorder.      Foot Problems Related to Equinus  Depending on how a patient compensates for the inability to bend properly at the ankle, a variety of foot conditions can develop, including:    Plantar fasciitis (arch/heel pain)  Calf cramping  Tendonitis (inflammation in the Achilles tendon)  Metatarsalgia (pain and/or callusing on the ball of the foot)  Flatfoot  Arthritis of the midfoot (middle area of the foot)  Pressure sores on the ball of the foot or the arch  Bunions and hammertoes  Ankle pain  Shin splints     Diagnosis  Most patients with equinus are unaware they have this condition when they first visit the doctor. Instead, they come to the doctor seeking relief for foot problems associated with equinus.    To diagnose equinus, the foot and ankle surgeon will evaluate the ankle's range of motion when the knee is flexed (bent) as well as extended (straightened). This enables the surgeon to identify whether the tendon or muscle is tight and to assess whether bone is interfering with ankle motion. X-rays may also be ordered. In some cases, the foot and ankle surgeon may refer the  patient for neurologic evaluation.    Nonsurgical Treatment  Treatment includes strategies aimed at relieving the symptoms and conditions associated with equinus. In addition, the patient is treated for the equinus itself through one or more of the following options:    Night splint. The foot may be placed in a splint at night to keep it in a position that helps reduce tightness of the calf muscle.  Heel lifts. Placing heel lifts inside the shoes or wearing shoes with a moderate heel takes stress off the Achilles tendon when walking and may reduce symptoms.  Arch supports or orthotic devices. Custom orthotic devices that fit into the shoe are often prescribed to keep weight distributed properly and to help control muscle/tendon imbalance.  Physical therapy. To help remedy muscle tightness, exercises that stretch the calf muscle(s) are recommended.     When Is Surgery Needed?  In some cases, surgery may be needed to correct the cause of equinus if it is related to a tight tendon or a bone blocking the ankle motion. The foot and ankle surgeon will determine the type of procedure that is best suited to the individual patient.                Ankle Dorsiflexion/Plantarflexion (Flexibility)    Sit on the floor or in bed with your legs straight in front of you.  Point both feet. Then flex both feet.  Do this 10 to 30 times in a row.  Repeat this exercise 2 times a day, or as instructed.  Date Last Reviewed: 5/1/2016 © 2000-2016 [employer]. [address] [place] [address]. All rights reserved. This information is not intended as a substitute for professional medical care. Always follow your healthcare professional's instructions.          Arch retraining    These exercises are for your right foot. Switch sides for your left foot.  Sit in a chair or stand with both feet flat on the floor. Press down with the ball of your right foot, but only on the left side of the foot, just under the big  toe.  Then pull the bottom of your big toe back toward your heel. This should pull up the arch of your foot. Dont flex your toes while doing this. It is a subtle movement of the arch.  Hold for 5 seconds. Relax.  Date Last Reviewed: 3/10/2016  © 2739-0401 [employer] [address] [place] [address]. All rights reserved. This information is not intended as a substitute for professional medical care. Always follow your healthcare professional's instructions.        Soleus Stretch (Flexibility)    Stand facing a wall from 3 feet away. Take one step toward the wall with your right foot.  Place both palms on the wall. Bend both knees and lean forward. Keep both heels on the floor.  Hold for 30 to 60 seconds. Then relax both legs. Repeat the exercise 2 times.  Switch legs and repeat.  Repeat this exercise 3 times a day, or as instructed.     Tip: Dont bounce while youre stretching.   Date Last Reviewed: 3/10/2016  © 0457-0180 [employer] [address] [place] [address]. All rights reserved. This information is not intended as a substitute for professional medical care. Always follow your healthcare professional's instructions.          Recommended OTC orthotics:  -powerstep  -superfeet    Recommended shoegear:  -new balance  -ascics  -mizuno  -guerrero        Bunions  Even though bunions are a common foot deformity, there are misconceptions about them. Many people may unnecessarily suffer the pain of bunions for years before seeking treatment.    What Is a Bunion?          A bunion (also referred to as hallux valgus) is often described as a bump on the side of the big toe. But a bunion is more than that. The visible bump actually reflects changes in the bony framework of the front part of the foot. The big toe leans toward the second toe, rather than pointing straight ahead. This throws the bones out of alignment--producing the bunions bump.        Bunions are a  progressive disorder. They begin with a leaning of the big toe, gradually changing the angle of the bones over the years and slowly producing the characteristic bump, which becomes increasingly prominent. Symptoms usually appear at later stages, although some people never have symptoms.    Causes  Bunions are most often caused by an inherited faulty mechanical structure of the foot. It is not the bunion itself that is inherited but certain foot types that make a person prone to developing a bunion.    Although wearing shoes that crowd the toes will not actually cause bunions, it sometimes makes the deformity get progressively worse. Symptoms may therefore appear sooner.    Symptoms  Symptoms, which occur at the site of the bunion, may include:    Pain or soreness  Inflammation and redness  A burning sensation  Possible numbness    Symptoms occur most often when wearing shoes that crowd the toes, such as shoes with a tight toe box or high heels. This may explain why women are more likely to have symptoms than men. In addition, spending long periods of time on your feet can aggravate the symptoms of bunions.    Diagnosis  Diagram indicating location of bunion on a footBunions are readily apparent--the prominence is visible at the base of the big toe or side of the foot. However, to fully evaluate the condition, the foot and ankle surgeon may take x-rays to determine the degree of the deformity and assess the changes that have occurred.    Because bunions are progressive, they do not go away and will usually get worse over time. But not all cases are alike--some bunions progress more rapidly than others. Once your surgeon has evaluated your bunion, a treatment plan can be developed that is suited to your needs.    Nonsurgical Treatment  Sometimes observation of the bunion is all that is needed. To reduce the chance of damage to the joint, periodic evaluation and x-rays by your surgeon are advised.    In many other  cases, however, some type of treatment is needed. Early treatments are aimed at easing the pain of bunions, but they will not reverse the deformity itself. These include:    Changes in shoewear. Wearing the right kind of shoes is very important. Choose shoes that have a wide toe box and forgo those with pointed toes or high heels, which may aggravate the condition.  Padding. Pads placed over the area of the bunion can help minimize pain. These can be obtained from your surgeon or purchased at a drug store.  Activity modifications. Avoid activity that causes bunion pain, including standing for long periods of time.  Medications. Oral nonsteroidal anti-inflammatory drugs (NSAIDs), such as ibuprofen, may be recommended to reduce pain and inflammation.  Icing. Applying an ice pack several times a day helps reduce inflammation and pain.  Injection therapy. Although rarely used in bunion treatment, injections of corticosteroids may be useful in treating the inflamed bursa (fluid-filled sac located around a joint) sometimes seen with bunions.  Orthotic devices. In some cases, custom orthotic devices may be provided by the foot and ankle surgeon.      Recommended OTC orthotics:  -powerstep  -superfeet    Recommended shoegear:  -new balance  -ascics  -mizuno  -guerrero       When Is Surgery Needed?  If nonsurgical treatments fail to relieve bunion pain and when the pain of a bunion interferes with daily activities, it is time to discuss surgical options with a foot and ankle surgeon. Together you can decide if surgery is best for you.    A variety of surgical procedures is available to treat bunions. The procedures are designed to remove the bump of bone, correct the changes in the bony structure of the foot and correct soft tissue changes that may also have occurred. The goal of surgery is the reduction of pain and deformity.    In selecting the procedure or combination of procedures for your particular case, the foot and  ankle surgeon will take into consideration the extent of your deformity based on the x-ray findings, your age, your activity level and other factors. The length of the recovery period will vary, depending on the procedure or procedures performed.                                          Bunion    You have a bunion. This is a bony bump at the base of your big toe, along the inside edge of your foot. As the bump gets bigger, it can become red, swollen, and painful with shoe wear.  Bunions may occur if you wear shoes that are too tight and pinch your toes together. High heels may make this worse. In some cases a bunion is due to poor alignment of the foot and ankle. This puts extra weight on the instep of each foot.  Once a bunion forms, it changes the way weight is spread all across your foot. This causes the bunion to get worse over time. The big toe will bend more and more toward the other toes.  A minor bunion can be treated by:  Wearing properly fitting shoes  Using bunion pads  Wearing shoe inserts, called orthotics, to better align the foot and ankle  Physical therapy with ultrasound or whirlpool baths can ease pain, redness, and swelling. Severe cases may require surgery. If you dont treat what is causing the bunion, it may get larger and more painful.  Home care  Limit high heels. These shoes force your foot forward, crowding the toes together.  Switch to comfortable shoes with a wide toe area. Or have your existing shoes stretched by a shoe repair shop.  Avoid shoes that are tight, narrow, or pointed.  If you are flat-footed, using arch supports may help prevent further deformity. The best shoe inserts are the ones custom made by a foot specialist, called a podiatrist, or other healthcare provider.  Put a bunion pad over the bunion to ease pressure of your shoe against the bunion. You can buy these pads at most pharmacies without a prescription  To reduce pain and swelling, apply an ice pack over the injured  "area for 15 to 20 minutes. Do this every 1 to 2 hours the first day. Keep using ice 3 to 4 times a day until the pain and swelling goes away.  To make an ice pack, put ice cubes in a sealed zip-lock plastic bag. Wrap the bag in a clean, thin towel or cloth. Never put ice or an ice pack directly on the skin.  You may use over-the-counter pain medicine to control pain, unless another medicine was prescribed. Talk with your provider before using these medicines if you have chronic liver or kidney disease, or ever had a stomach ulcer or GI (gastrointestinal) bleeding.    Follow-up care  Follow up with a podiatrist or foot doctor, or as advised.  If X-rays were taken, you will be notified of any new findings that may affect your care.  When to seek medical care  Contact your healthcare provider if any of the following occur:  Increasing pain or redness around the base of the big toe  Painful ingrown toenail, with redness and swelling or pus around the nail  Date Last Reviewed: 11/21/2015  © 7796-3417 [employer]. [address] [place] [address]. All rights reserved. This information is not intended as a substitute for professional medical care. Always follow your healthcare professional's instructions.        Treating Bunions  Although a bunion wont go away, wearing shoes that fit properly will often relieve the pain. Padding and icing the bunion may also help. Bunions that remain painful may need surgery.     Heels: Heel height should be low. The back of the shoe should  your heel firmly so the shoe doesn't flop when you walk.         Toes: There should be 1/2" between your longest toe and the tip of the shoe. The shoe should be wide enough for you to wiggle your toes.    Shoes  To relieve a bunion, you dont have to buy shoes that are ugly or out of fashion. But follow these tips:  Shop for shoes late in the day. This is when your feet are the largest.  Have both feet measured often. Fit " shoes to your larger foot.  Look for shoes that have the same shape as your foot but are slightly wider across the toes.  Choose low-heeled shoes.  Always try shoes on. Stand up and walk around. If the shoes arent comfortable, dont buy them.  Ice massage  To help relieve a painful bunion, put an ice cube in a plastic bag. Rub the ice on the bunion for 5 minutes. Repeat 2 to 3 times a day.  Pads  You may want to put a pad over the bunion to cushion it. You can buy bunion pads at most [employer].  Surgery  Wearing wider shoes and padding the bunion may not relieve the pain. Your healthcare provider may then suggest surgery. During surgery, the bunion is shaved away and the bones are put back in a straight line.   Date Last Reviewed: 9/27/2015  © 4972-2397 [employer]. [address] [place] [address]. All rights reserved. This information is not intended as a substitute for professional medical care. Always follow your healthcare professional's instructions.      Osteotomy and Ligament or Tendon Repair (Bunion Surgery)  Osteotomy and ligament or tendon repair is a type of bunion surgery. A bunion is a bony bump (growth) at the base of your big toe. This growth can form when your big toe pushes against your next toe. A bunion can cause pain, swelling, redness, and other symptoms. During this surgery, bone is removed from your toe. Nearby tendons and ligaments are made shorter or longer as needed. This allows your big toe to line up (align) properly.    Preparing for surgery  Follow any instructions from your healthcare provider.  Tell your surgeon about any medicines you are taking. You may need to stop taking all or some of these before the procedure. This includes:  All prescription medicines  Over-the-counter medicines such as aspirin or ibuprofen  Street drugs  Herbs, vitamins, and other supplements  Also, follow any directions youre given for not eating or drinking before surgery.  The  day of surgery  The surgery takes at least 60 minutes. You will likely go home the same day.  Before the surgery begins  An IV (intravenous) line is put into a vein in your arm or hand. This line gives you fluids and medicines.  You may be given medicine to help you relax (sedation). To keep you free of pain during the surgery, you may have medicine to block the nerves in your foot. Or, you will be given general anesthesia. This puts you into a deep sleep.  During the surgery  A cut (incision) is made on your foot to expose the bunion bump, and the tendons and ligaments around it. The tendons and ligaments that are tight are cut (released).  The bunion bump is removed with a bone saw. Your big toe bone or the main bone in your foot is shortened and realigned. A pin, screw, or plate is used to hold your toe and foot bones together.  The nearby tendons and ligaments may be tightened. If there is extra tissue, it is removed and the ends are stitched (sutured) together. The incision in your skin is then closed with sutures. Your foot is bandaged.  After the surgery  Youll be taken to a recovery room. You may have medicines to manage pain. You may wear a brace, surgical shoe, or cast to protect your foot while it heals. The surgeon will tell you when you can go home. Have an adult family member or friend drive you.  Recovering at home  Once home, follow any instructions you are given. During your recovery:  Take pain medicine exactly as directed.  To prevent swelling, sit or lie with your leg raised on one or more pillows. Do this for the first 2 days.  Follow your surgeon's instructions about putting weight on your foot after the surgery. You may need to use a walker, cane, or crutches for a time.  You may wear a brace, surgical shoe, or cast for up to a month or longer. Care for this as instructed. Keep it dry by wrapping it in plastic bags when bathing.  Avoid sports and other activities until your surgeon says its  OK.  Care for your incision as instructed.  Don't drive until your surgeon says its OK.  Call your surgeon if you have any of the following:  Chest pain or trouble breathing  Fever of 100.4°F (38°C) or higher, or as directed by your surgeon  Pain that isnt helped by medicine or rest  Increased swelling not helped by raising or icing your foot  Signs of infection at any incision site, such as increased redness or swelling, warmth, more pain, or bad-smelling drainage  Bleeding through the bandages  Symptoms of poor circulation, such as toes that look blue instead of pinkish  Numbness that doesnt go away  Any other signs or symptoms indicated by your surgeon   Follow-up  Keep all follow-up appointments with your surgeon. These are to check that you are healing well from the surgery. You may have X-rays to check how the bone is healing. Physical therapy, foot exercises, and other treatments may be discussed at follow-up visits. Full recovery can take at least a few months.  Risks and possible complications include:  Infection  Bleeding  Sensitivity at the incision site for months after the surgery  Foot pain that doesnt go away after surgery  Numbness in the foot  Only partial relief of symptoms, or no relief of symptoms  Return of the bunion  Risks of anesthesia (the anesthesiologist will discuss these with you)  Poor wound healing  Breakage of screws or pins  A lot of scarring   Date Last Reviewed: 7/28/2015  © 7383-9067 [employer]. [address] [place] [id number]. All rights reserved. This information is not intended as a substitute for professional medical care. Always follow your healthcare professional's instructions.
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
## 2023-06-12 ENCOUNTER — OFFICE VISIT (OUTPATIENT)
Dept: FAMILY MEDICINE | Facility: HOSPITAL | Age: 48
End: 2023-06-12
Payer: MEDICAID

## 2023-06-12 VITALS
HEART RATE: 78 BPM | HEIGHT: 68 IN | DIASTOLIC BLOOD PRESSURE: 77 MMHG | SYSTOLIC BLOOD PRESSURE: 121 MMHG | WEIGHT: 197.06 LBS | BODY MASS INDEX: 29.86 KG/M2

## 2023-06-12 DIAGNOSIS — G89.29 CHRONIC MIDLINE LOW BACK PAIN, UNSPECIFIED WHETHER SCIATICA PRESENT: ICD-10-CM

## 2023-06-12 DIAGNOSIS — Z13.29 SCREENING FOR THYROID DISORDER: ICD-10-CM

## 2023-06-12 DIAGNOSIS — E11.65 TYPE 2 DIABETES MELLITUS WITH HYPERGLYCEMIA, WITHOUT LONG-TERM CURRENT USE OF INSULIN: Primary | ICD-10-CM

## 2023-06-12 DIAGNOSIS — Z13.6 ENCOUNTER FOR LIPID SCREENING FOR CARDIOVASCULAR DISEASE: ICD-10-CM

## 2023-06-12 DIAGNOSIS — Z13.0 SCREENING FOR DEFICIENCY ANEMIA: ICD-10-CM

## 2023-06-12 DIAGNOSIS — Z91.89 ELECTROLYTE IMBALANCE RISK: ICD-10-CM

## 2023-06-12 DIAGNOSIS — Z13.220 ENCOUNTER FOR LIPID SCREENING FOR CARDIOVASCULAR DISEASE: ICD-10-CM

## 2023-06-12 DIAGNOSIS — M54.50 CHRONIC MIDLINE LOW BACK PAIN, UNSPECIFIED WHETHER SCIATICA PRESENT: ICD-10-CM

## 2023-06-12 PROCEDURE — 99213 OFFICE O/P EST LOW 20 MIN: CPT

## 2023-06-12 RX ORDER — LISINOPRIL 2.5 MG/1
2.5 TABLET ORAL DAILY
Qty: 90 TABLET | Refills: 1 | Status: SHIPPED | OUTPATIENT
Start: 2023-06-12 | End: 2023-10-26 | Stop reason: SDUPTHER

## 2023-06-12 RX ORDER — METFORMIN HYDROCHLORIDE 1000 MG/1
1000 TABLET ORAL 2 TIMES DAILY WITH MEALS
Qty: 180 TABLET | Refills: 3 | Status: SHIPPED | OUTPATIENT
Start: 2023-06-12 | End: 2023-08-21 | Stop reason: SDUPTHER

## 2023-06-12 RX ORDER — ATORVASTATIN CALCIUM 40 MG/1
40 TABLET, FILM COATED ORAL DAILY
Qty: 90 TABLET | Refills: 1 | Status: SHIPPED | OUTPATIENT
Start: 2023-06-12 | End: 2023-10-26 | Stop reason: SDUPTHER

## 2023-06-12 RX ORDER — SEMAGLUTIDE 0.68 MG/ML
0.25 INJECTION, SOLUTION SUBCUTANEOUS
Qty: 3 ML | Refills: 0 | Status: SHIPPED | OUTPATIENT
Start: 2023-06-12 | End: 2023-07-26 | Stop reason: SDUPTHER
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
## 2023-06-14 NOTE — PROGRESS NOTES
I assume primary medical responsibility for this patient. I have reviewed the history, physical, and assessement & treatment plan with the resident and agree that the care is reasonable and necessary. This service has been performed by a resident without the presence of a teaching physician under the primary care exception. If necessary, an addendum of additional findings or evaluation beyond the resident documentation will be noted below.      Kasey Chacon MD    [facility]

## 2023-07-25 ENCOUNTER — PATIENT MESSAGE (OUTPATIENT)
Dept: FAMILY MEDICINE | Facility: HOSPITAL | Age: 48
End: 2023-07-25
Payer: MEDICAID

## 2023-07-26 DIAGNOSIS — E11.65 TYPE 2 DIABETES MELLITUS WITH HYPERGLYCEMIA, WITHOUT LONG-TERM CURRENT USE OF INSULIN: ICD-10-CM

## 2023-07-26 RX ORDER — SEMAGLUTIDE 0.68 MG/ML
0.25 INJECTION, SOLUTION SUBCUTANEOUS
Qty: 3 ML | Refills: 0 | Status: SHIPPED | OUTPATIENT
Start: 2023-07-26 | End: 2023-08-04 | Stop reason: SDUPTHER

## 2023-08-04 DIAGNOSIS — E11.65 TYPE 2 DIABETES MELLITUS WITH HYPERGLYCEMIA, WITHOUT LONG-TERM CURRENT USE OF INSULIN: ICD-10-CM

## 2023-08-06 RX ORDER — SEMAGLUTIDE 0.68 MG/ML
0.25 INJECTION, SOLUTION SUBCUTANEOUS
Qty: 3 ML | Refills: 0 | Status: SHIPPED | OUTPATIENT
Start: 2023-08-06 | End: 2023-08-21 | Stop reason: SDUPTHER

## 2023-08-21 ENCOUNTER — OFFICE VISIT (OUTPATIENT)
Dept: FAMILY MEDICINE | Facility: HOSPITAL | Age: 48
End: 2023-08-21
Payer: MEDICAID

## 2023-08-21 VITALS
WEIGHT: 201.5 LBS | BODY MASS INDEX: 30.54 KG/M2 | SYSTOLIC BLOOD PRESSURE: 113 MMHG | HEIGHT: 68 IN | DIASTOLIC BLOOD PRESSURE: 75 MMHG | HEART RATE: 69 BPM

## 2023-08-21 DIAGNOSIS — M54.50 CHRONIC MIDLINE LOW BACK PAIN, UNSPECIFIED WHETHER SCIATICA PRESENT: ICD-10-CM

## 2023-08-21 DIAGNOSIS — E11.65 TYPE 2 DIABETES MELLITUS WITH HYPERGLYCEMIA, WITHOUT LONG-TERM CURRENT USE OF INSULIN: Primary | ICD-10-CM

## 2023-08-21 DIAGNOSIS — E11.65 UNCONTROLLED TYPE 2 DIABETES MELLITUS WITH HYPERGLYCEMIA: ICD-10-CM

## 2023-08-21 DIAGNOSIS — E66.09 CLASS 1 OBESITY DUE TO EXCESS CALORIES WITH SERIOUS COMORBIDITY AND BODY MASS INDEX (BMI) OF 30.0 TO 30.9 IN ADULT: ICD-10-CM

## 2023-08-21 DIAGNOSIS — G89.29 CHRONIC MIDLINE LOW BACK PAIN, UNSPECIFIED WHETHER SCIATICA PRESENT: ICD-10-CM

## 2023-08-21 PROBLEM — E66.811 CLASS 1 OBESITY DUE TO EXCESS CALORIES WITH SERIOUS COMORBIDITY AND BODY MASS INDEX (BMI) OF 30.0 TO 30.9 IN ADULT: Status: ACTIVE | Noted: 2023-08-21

## 2023-08-21 PROCEDURE — 99213 OFFICE O/P EST LOW 20 MIN: CPT

## 2023-08-21 RX ORDER — METFORMIN HYDROCHLORIDE 1000 MG/1
1000 TABLET ORAL 2 TIMES DAILY WITH MEALS
Qty: 180 TABLET | Refills: 3 | Status: SHIPPED | OUTPATIENT
Start: 2023-08-21 | End: 2023-10-26 | Stop reason: SDUPTHER

## 2023-08-21 RX ORDER — SEMAGLUTIDE 0.68 MG/ML
0.5 INJECTION, SOLUTION SUBCUTANEOUS
Qty: 3 ML | Refills: 0 | Status: SHIPPED | OUTPATIENT
Start: 2023-08-21 | End: 2023-10-16

## 2023-08-21 RX ORDER — DICLOFENAC SODIUM 10 MG/G
4 GEL TOPICAL 4 TIMES DAILY PRN
Qty: 200 G | Refills: 2 | Status: SHIPPED | OUTPATIENT
Start: 2023-08-21
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
## 2023-08-22 NOTE — PROGRESS NOTES
I assume primary medical responsibility for this patient. I have reviewed the history, physical, and assessment & treatment plan with the resident and agree that the care is reasonable and necessary. This service has been performed by a resident without the presence of a teaching physician under the primary care exception. If necessary, an addendum of additional findings or evaluation beyond the resident documentation will be noted below.        Chavez Mitchell Jr., DO    [facility]

## 2023-08-24 NOTE — PLAN OF CARE
Patient is accompanied by wife, patient is on TB isolation while he is being ruled out for PNA vs TB vs Cancer.       09/10/18 1612   Discharge Reassessment   Assessment Type Discharge Planning Reassessment   Discharge Plan A Home with family      Otezla Pregnancy And Lactation Text: This medication is Pregnancy Category C and it isn't known if it is safe during pregnancy. It is unknown if it is excreted in breast milk.

## 2023-10-16 ENCOUNTER — PATIENT MESSAGE (OUTPATIENT)
Dept: FAMILY MEDICINE | Facility: HOSPITAL | Age: 48
End: 2023-10-16
Payer: MEDICAID

## 2023-10-16 DIAGNOSIS — E11.65 TYPE 2 DIABETES MELLITUS WITH HYPERGLYCEMIA, WITHOUT LONG-TERM CURRENT USE OF INSULIN: Primary | ICD-10-CM

## 2023-10-16 RX ORDER — SEMAGLUTIDE 1.34 MG/ML
1 INJECTION, SOLUTION SUBCUTANEOUS
Qty: 3 ML | Refills: 1 | Status: SHIPPED | OUTPATIENT
Start: 2023-10-16 | End: 2023-10-26

## 2023-10-16 NOTE — PROGRESS NOTES
Called pt regarding needing follow up appointment in clinic as soon as possible. Pt states he ran out of Ozempic 0.5mg. Discussed HbA1c 10%. Pt states he will make a clinic appointment today.    1. Type 2 diabetes mellitus with hyperglycemia, without long-term current use of insulin  Increased dose 0.5 mg to 1mg Ozempic:  - semaglutide (OZEMPIC) 1 mg/dose (4 mg/3 mL); Inject 1 mg into the skin every 7 days.  Dispense: 3 mL; Refill: 1  - HEMOGLOBIN A1C; Future      Florencia Rose MD, MPH  [facility] PGY-2  10/16/2023

## 2023-10-26 ENCOUNTER — OFFICE VISIT (OUTPATIENT)
Dept: FAMILY MEDICINE | Facility: HOSPITAL | Age: 48
End: 2023-10-26
Payer: MEDICAID

## 2023-10-26 ENCOUNTER — LAB VISIT (OUTPATIENT)
Dept: LAB | Facility: HOSPITAL | Age: 48
End: 2023-10-26
Payer: MEDICAID

## 2023-10-26 VITALS
SYSTOLIC BLOOD PRESSURE: 127 MMHG | HEART RATE: 80 BPM | DIASTOLIC BLOOD PRESSURE: 79 MMHG | HEIGHT: 68 IN | BODY MASS INDEX: 30.67 KG/M2 | WEIGHT: 202.38 LBS

## 2023-10-26 DIAGNOSIS — E11.65 TYPE 2 DIABETES MELLITUS WITH HYPERGLYCEMIA, WITHOUT LONG-TERM CURRENT USE OF INSULIN: Primary | ICD-10-CM

## 2023-10-26 DIAGNOSIS — Z23 NEED FOR MENINGOCOCCUS VACCINE: ICD-10-CM

## 2023-10-26 DIAGNOSIS — Z23 NEED FOR INFLUENZA VACCINATION: ICD-10-CM

## 2023-10-26 DIAGNOSIS — E11.65 TYPE 2 DIABETES MELLITUS WITH HYPERGLYCEMIA, WITHOUT LONG-TERM CURRENT USE OF INSULIN: ICD-10-CM

## 2023-10-26 DIAGNOSIS — E11.65 UNCONTROLLED TYPE 2 DIABETES MELLITUS WITH HYPERGLYCEMIA: ICD-10-CM

## 2023-10-26 DIAGNOSIS — M79.2 NEUROPATHIC PAIN: ICD-10-CM

## 2023-10-26 DIAGNOSIS — Z23 NEED FOR DIPHTHERIA-TETANUS-PERTUSSIS (TDAP) VACCINE: ICD-10-CM

## 2023-10-26 LAB
ESTIMATED AVG GLUCOSE: 232 MG/DL (ref 68–131)
HBA1C MFR BLD: 9.7 % (ref 4–5.6)

## 2023-10-26 PROCEDURE — 99213 OFFICE O/P EST LOW 20 MIN: CPT

## 2023-10-26 PROCEDURE — 90472 IMMUNIZATION ADMIN EACH ADD: CPT

## 2023-10-26 PROCEDURE — 83036 HEMOGLOBIN GLYCOSYLATED A1C: CPT

## 2023-10-26 PROCEDURE — 90715 TDAP VACCINE 7 YRS/> IM: CPT

## 2023-10-26 PROCEDURE — 36415 COLL VENOUS BLD VENIPUNCTURE: CPT

## 2023-10-26 PROCEDURE — 90734 MENACWYD/MENACWYCRM VACC IM: CPT

## 2023-10-26 PROCEDURE — 90471 IMMUNIZATION ADMIN: CPT

## 2023-10-26 RX ORDER — SEMAGLUTIDE 2.68 MG/ML
2 INJECTION, SOLUTION SUBCUTANEOUS
Qty: 9 ML | Refills: 0 | Status: SHIPPED | OUTPATIENT
Start: 2023-10-26 | End: 2024-01-24

## 2023-10-26 RX ORDER — BLOOD-GLUCOSE CONTROL, NORMAL
EACH MISCELLANEOUS
Qty: 200 EACH | Refills: 1 | Status: SHIPPED | OUTPATIENT
Start: 2023-10-26

## 2023-10-26 RX ORDER — LISINOPRIL 2.5 MG/1
2.5 TABLET ORAL DAILY
Qty: 90 TABLET | Refills: 3 | Status: SHIPPED | OUTPATIENT
Start: 2023-10-26 | End: 2024-10-25

## 2023-10-26 RX ORDER — METFORMIN HYDROCHLORIDE 1000 MG/1
1000 TABLET ORAL 2 TIMES DAILY WITH MEALS
Qty: 180 TABLET | Refills: 3 | Status: SHIPPED | OUTPATIENT
Start: 2023-10-26 | End: 2024-10-25

## 2023-10-26 RX ORDER — DEXTROSE 4 G
TABLET,CHEWABLE ORAL
Qty: 1 EACH | Refills: 0 | Status: SHIPPED | OUTPATIENT
Start: 2023-10-26

## 2023-10-26 RX ORDER — ATORVASTATIN CALCIUM 40 MG/1
40 TABLET, FILM COATED ORAL DAILY
Qty: 90 TABLET | Refills: 3 | Status: SHIPPED | OUTPATIENT
Start: 2023-10-26 | End: 2024-10-25

## 2023-10-26 RX ORDER — GABAPENTIN 100 MG/1
100 CAPSULE ORAL 3 TIMES DAILY
Qty: 270 CAPSULE | Refills: 3 | Status: SHIPPED | OUTPATIENT
Start: 2023-10-26 | End: 2024-10-25

## 2023-10-26 NOTE — PROGRESS NOTES
"  [facility] FAMILY MEDICINE CLINIC NOTE  Follow-up Visit      SUBJECTIVE:     Patient: Andrea Ashraf is a 48 y.o. male.    Chief Complaint:   Chief Complaint   Patient presents with    Follow-up     DIABETES       History of Present Illness:  Patient presents to clinic for follow-up on diabetes  He is planning to go to [place] next week for 2 months because his uncle passed away  Needs flu shot, tetanus shot (had injury with screw to left index finger), and meningococcal vaccine  Advised patient to get COVID vaccine which he is agreeable to    Lab Results   Component Value Date    HGBA1C 10.0 (H) 06/12/2023     Currently on Metformin 1,000 mg BID and Ozempic 1 mg weekly  Tolerating Ozempic well without side effects  Labs reviewed with patient    Drives long hours for work, advised patient to use compression socks      ROS  A 10+ review of systems was performed with pertinent positives and negatives noted above in the history of present illness. Other systems were negative unless otherwise specified.    OBJECTIVE:     Vital Signs (Most Recent)  Vitals:    10/26/23 1007   BP: 127/79   Pulse: 80   Weight: 91.8 kg (202 lb 6.1 oz)   Height: 5' 8" (1.727 m)     BMI: Body mass index is 30.77 kg/m².     Physical Exam:  Gen: No apparent distress, cooperative & interactive  CV: RRR, S1 and S2 present, no LE edema  Resp: CTAB, normal respiratory effort     ASSESSMENT:   Andrea Ashraf is a 48 y.o. male who presents to clinic to for    1. Type 2 diabetes mellitus with hyperglycemia, without long-term current use of insulin    2. Uncontrolled type 2 diabetes mellitus with hyperglycemia    3. Need for diphtheria-tetanus-pertussis (Tdap) vaccine    4. Need for influenza vaccination    5. Need for meningococcus vaccine    6. Neuropathic pain         PLAN:     1. Uncontrolled type 2 diabetes mellitus with hyperglycemia  Refilled:  - atorvastatin (LIPITOR) 40 MG tablet; Take 1 tablet (40 mg total) by mouth once daily.  " Dispense: 90 tablet; Refill: 3  Increased dose of Ozempic from 1 mg to 2 mg:  - semaglutide (OZEMPIC) 2 mg/dose (8 mg/3 mL) PnIj; Inject 2 mg into the skin every 7 days.  Dispense: 9 mL; Refill: 0  Refilled:  - metFORMIN (GLUCOPHAGE) 1000 MG tablet; Take 1 tablet (1,000 mg total) by mouth 2 (two) times daily with meals.  Dispense: 180 tablet; Refill: 3  - lisinopriL (PRINIVIL,ZESTRIL) 2.5 MG tablet; Take 1 tablet (2.5 mg total) by mouth once daily.  Dispense: 90 tablet; Refill: 3  - blood-glucose meter Misc; use as directed to check blood glucose once daily  Dispense: 1 each; Refill: 0  - lancets 30 gauge Misc; use as directed to check blood glucose once daily  Dispense: 200 each; Refill: 1  - blood sugar diagnostic Strp; use asdirected to check blood  glucose once daily  Dispense: 200 each; Refill: 1    2. Type 2 diabetes mellitus with hyperglycemia, without long-term current use of insulin  - atorvastatin (LIPITOR) 40 MG tablet; Take 1 tablet (40 mg total) by mouth once daily.  Dispense: 90 tablet; Refill: 3  - semaglutide (OZEMPIC) 2 mg/dose (8 mg/3 mL) PnIj; Inject 2 mg into the skin every 7 days.  Dispense: 9 mL; Refill: 0  - metFORMIN (GLUCOPHAGE) 1000 MG tablet; Take 1 tablet (1,000 mg total) by mouth 2 (two) times daily with meals.  Dispense: 180 tablet; Refill: 3  - lisinopriL (PRINIVIL,ZESTRIL) 2.5 MG tablet; Take 1 tablet (2.5 mg total) by mouth once daily.  Dispense: 90 tablet; Refill: 3  - blood-glucose meter Misc; use as directed to check blood glucose once daily  Dispense: 1 each; Refill: 0  - lancets 30 gauge Misc; use as directed to check blood glucose once daily  Dispense: 200 each; Refill: 1  - blood sugar diagnostic Strp; use asdirected to check blood  glucose once daily  Dispense: 200 each; Refill: 1    3. Need for diphtheria-tetanus-pertussis (Tdap) vaccine  - (In Office Administered) Tdap Vaccine    4. Need for influenza vaccination  - Influenza - Quadrivalent *Preferred* (6 months+)  (PF)    5. Need for meningococcus vaccine  - (In Office Administered) Meningococcal Conjugate - MCV4O (MENVEO) 1 VIAL Ages 10 Years-55 Years    6. Neuropathic pain  New med started:  - gabapentin (NEURONTIN) 100 MG capsule; Take 1 capsule (100 mg total) by mouth 3 (three) times daily.  Dispense: 270 capsule; Refill: 3      Provided patient with anticipatory guidance and return precautions. Treatment plan discussed with patient, all questions answered, and patient acknowledged understanding and verbal agreement.      Follow-up in: 3 months; or sooner PRN if acute concerns arise.      Case discussed with Dr. UMA Rose MD, MPH  [facility] Family Medicine PGY-2  10/26/2023        The following information is provided to all patients.  This information is to help you find resources for any of the problems found today that may be affecting your health:                Living healthy guide: www.[facility].[place].gov       Understanding Diabetes: www.diabetes.org       Eating healthy: www.cdc.gov/healthyweight      [facility] home safety checklist: www.cdc.gov/steadi/patient.html      Agency on Aging: www.goea.[place].[facility]       Alcoholics anonymous (AA): www.aa.org      Physical Activity: www.gage.nih.gov/wv3dppd       Tobacco use: www.quitwithusla.org

## 2024-08-05 ENCOUNTER — PATIENT MESSAGE (OUTPATIENT)
Dept: FAMILY MEDICINE | Facility: HOSPITAL | Age: 49
End: 2024-08-05

## 2024-08-06 ENCOUNTER — OFFICE VISIT (OUTPATIENT)
Dept: FAMILY MEDICINE | Facility: HOSPITAL | Age: 49
End: 2024-08-06

## 2024-08-06 VITALS
HEIGHT: 68 IN | WEIGHT: 197.06 LBS | BODY MASS INDEX: 29.86 KG/M2 | DIASTOLIC BLOOD PRESSURE: 87 MMHG | HEART RATE: 80 BPM | SYSTOLIC BLOOD PRESSURE: 124 MMHG

## 2024-08-06 DIAGNOSIS — E11.65 UNCONTROLLED TYPE 2 DIABETES MELLITUS WITH HYPERGLYCEMIA: ICD-10-CM

## 2024-08-06 DIAGNOSIS — S81.802A WOUND OF LEFT LOWER EXTREMITY, INITIAL ENCOUNTER: Primary | ICD-10-CM

## 2024-08-06 DIAGNOSIS — E11.65 TYPE 2 DIABETES MELLITUS WITH HYPERGLYCEMIA, WITHOUT LONG-TERM CURRENT USE OF INSULIN: ICD-10-CM

## 2024-08-06 PROCEDURE — 99213 OFFICE O/P EST LOW 20 MIN: CPT

## 2024-08-06 RX ORDER — METFORMIN HYDROCHLORIDE 1000 MG/1
1000 TABLET ORAL 2 TIMES DAILY WITH MEALS
Qty: 180 TABLET | Refills: 3 | Status: SHIPPED | OUTPATIENT
Start: 2024-08-06 | End: 2025-08-06

## 2024-08-06 RX ORDER — SULFAMETHOXAZOLE AND TRIMETHOPRIM 800; 160 MG/1; MG/1
1 TABLET ORAL 2 TIMES DAILY
Qty: 14 TABLET | Refills: 0 | Status: SHIPPED | OUTPATIENT
Start: 2024-08-06 | End: 2024-08-13

## 2024-08-06 RX ORDER — MUPIROCIN 20 MG/G
OINTMENT TOPICAL 3 TIMES DAILY
Qty: 22 G | Refills: 0 | Status: SHIPPED | OUTPATIENT
Start: 2024-08-06

## 2025-06-25 ENCOUNTER — TELEPHONE (OUTPATIENT)
Dept: FAMILY MEDICINE | Facility: HOSPITAL | Age: 50
End: 2025-06-25

## 2025-06-25 NOTE — TELEPHONE ENCOUNTER
Copied from CRM #[id number]. Topic: Appointments - Appointment Access  >> Jun 25, 2025  8:58 AM Linda wrote:  Type:  Sooner appt request    Who Called: Patient's wife    Best Call Back Number:[phone]    Additional Information: Patient is requesting a call back in regards to obtaining a same day appt for abdominal pain. Pt not present for symptom screen    Appointment scheduled for 6/30/2025 @ 1000